# Patient Record
Sex: MALE | Race: WHITE | Employment: OTHER | URBAN - METROPOLITAN AREA
[De-identification: names, ages, dates, MRNs, and addresses within clinical notes are randomized per-mention and may not be internally consistent; named-entity substitution may affect disease eponyms.]

---

## 2018-08-30 ENCOUNTER — TELEPHONE (OUTPATIENT)
Dept: ONCOLOGY | Age: 78
End: 2018-08-30

## 2018-08-30 DIAGNOSIS — C67.9 MALIGNANT NEOPLASM OF URINARY BLADDER, UNSPECIFIED SITE (HCC): ICD-10-CM

## 2018-09-04 NOTE — TELEPHONE ENCOUNTER
Dr Lindsay Patel called office and left voicemail stating he wants patient's tx started ASAP. Writer reviewed orders and they are approved. Writer informed Chance Richards, , that Dr Lindsay Patel wants tx started ASAP. Writer also informed Chance Richards that patient will need f/u appt on 9/24 with CNP. Naz Celis communicated understanding.  Vivian Hernandez

## 2018-09-05 ENCOUNTER — TELEPHONE (OUTPATIENT)
Dept: INFUSION THERAPY | Age: 78
End: 2018-09-05

## 2018-09-05 NOTE — TELEPHONE ENCOUNTER
No information found in chart to complete the double check. Patient is a transfer from [address] [place] [address]. BCG treatment plan in chart, see the order.

## 2018-09-07 ENCOUNTER — TELEPHONE (OUTPATIENT)
Dept: ONCOLOGY | Age: 78
End: 2018-09-07

## 2018-09-07 ENCOUNTER — OFFICE VISIT (OUTPATIENT)
Dept: ONCOLOGY | Age: 78
End: 2018-09-07
Payer: MEDICARE

## 2018-09-07 DIAGNOSIS — C67.9 MALIGNANT NEOPLASM OF URINARY BLADDER, UNSPECIFIED SITE (HCC): ICD-10-CM

## 2018-09-07 DIAGNOSIS — C67.9 MALIGNANT NEOPLASM OF URINARY BLADDER, UNSPECIFIED SITE (HCC): Primary | ICD-10-CM

## 2018-09-07 PROCEDURE — 99214 OFFICE O/P EST MOD 30 MIN: CPT

## 2018-09-07 PROCEDURE — 99999 PR OFFICE/OUTPT VISIT,PROCEDURE ONLY: CPT | Performed by: INTERNAL MEDICINE

## 2018-09-07 NOTE — PROGRESS NOTES
Marquis Perdomo here for chemotherapy teaching. Spent 90 minutes with them. Teaching sheets from [facility] and verbal information given on chemotherapy agents, action, administration and side effects. Chemotherapy medications discussed: BCG    Chemotherapy consent form signed by patient. Provided new patient binder, Chemotherapy and You booklet, Eating Hints booklet and welcome bag with folder, water bottle, note pad etc.     Questions answered and support given.     [facility] rehab referral assessment form, distress tool form and PG-SGA form completed by patient.     Needs that were identified during teaching visit: Marquis Perdomo has concerns about getting a catheter. He had one recently and experienced a lot of pain. I assured him we could use a numbing agent so he would not feel so much pain during catheterization. Explained how to hold BCG in the bladder and turning every 15 minutes for 2 hours. Discussed proper toileting and safety precautions as outlined in [facility] handout. [facility] questioned if he would need a ride because his wife doesn't drive. Informed him he could drive after the procedure and he states he would rather drive himself in that case. He has not had a TB skin test.  Will have it done on 9/10/19 at PCP office. He also has an appointment with PCP for clearance for treatment. Discussed community resources such as [address], [employer], [employer], [email or web address], etc.     Pt will return on 9/19/18 for C1D1.

## 2018-09-07 NOTE — TELEPHONE ENCOUNTER
Notified by pharmacy that pt is starting BCG therapy and hasnt rec'd TB testing. Test ordered as directed. Spoke with his PCP, Dr. Giorgi Lee office, they can accommodate testing on Mon with Wed read. Order given to pt to hand carry with appt times as well. Pt informed could start tx on Wed, after test was read on 9/12/18. He stopped at check out desk to get scheduled for BCG.

## 2018-09-10 ENCOUNTER — TELEPHONE (OUTPATIENT)
Dept: ONCOLOGY | Age: 78
End: 2018-09-10

## 2018-09-12 ENCOUNTER — TELEPHONE (OUTPATIENT)
Dept: ONCOLOGY | Age: 78
End: 2018-09-12

## 2018-09-14 ENCOUNTER — TELEPHONE (OUTPATIENT)
Dept: ONCOLOGY | Age: 78
End: 2018-09-14

## 2018-09-19 ENCOUNTER — HOSPITAL ENCOUNTER (OUTPATIENT)
Dept: INFUSION THERAPY | Age: 78
Discharge: HOME | End: 2018-09-19
Payer: MEDICARE

## 2018-09-19 VITALS
WEIGHT: 154.4 LBS | DIASTOLIC BLOOD PRESSURE: 70 MMHG | RESPIRATION RATE: 17 BRPM | BODY MASS INDEX: 24.92 KG/M2 | TEMPERATURE: 97.5 F | SYSTOLIC BLOOD PRESSURE: 149 MMHG | HEART RATE: 70 BPM

## 2018-09-19 DIAGNOSIS — C67.9 MALIGNANT NEOPLASM OF URINARY BLADDER, UNSPECIFIED SITE (HCC): ICD-10-CM

## 2018-09-19 PROCEDURE — 90586 BCG VACCINE INTRAVESICAL: CPT | Performed by: INTERNAL MEDICINE

## 2018-09-19 PROCEDURE — 6370000000 HC RX 637 (ALT 250 FOR IP): Performed by: INTERNAL MEDICINE

## 2018-09-19 PROCEDURE — 2580000003 HC RX 258: Performed by: INTERNAL MEDICINE

## 2018-09-19 PROCEDURE — 51720 TREATMENT OF BLADDER LESION: CPT

## 2018-09-19 PROCEDURE — 6360000002 HC RX W HCPCS: Performed by: INTERNAL MEDICINE

## 2018-09-19 RX ADMIN — LIDOCAINE HYDROCHLORIDE: 20 JELLY TOPICAL at 15:27

## 2018-09-19 RX ADMIN — SODIUM CHLORIDE 50 MG: 9 INJECTION, SOLUTION INTRAVENOUS at 15:29

## 2018-09-19 NOTE — PROGRESS NOTES
Patient straight cathed and medication injected through catheter. Patient tolerated procedure well patient positioned per protocol.

## 2018-09-19 NOTE — PROGRESS NOTES
RN reiterated instructions of when to void and what to do after voiding per protocol. Patient and wife verbalized understanding. Patient discharged home with family in stable condition.

## 2018-09-20 ENCOUNTER — TELEPHONE (OUTPATIENT)
Dept: ONCOLOGY | Age: 78
End: 2018-09-20

## 2018-09-20 NOTE — TELEPHONE ENCOUNTER
called patient to introduce self and services. The first phone number for patient did not belong to patient. When his home phone was called it was reported as disconnected.  will meet with patient at upcoming appointment.

## 2018-09-24 ENCOUNTER — TELEPHONE (OUTPATIENT)
Dept: ONCOLOGY | Age: 78
End: 2018-09-24

## 2018-09-24 ENCOUNTER — HOSPITAL ENCOUNTER (OUTPATIENT)
Age: 78
Discharge: HOME | End: 2018-09-24
Payer: MEDICARE

## 2018-09-24 DIAGNOSIS — C67.9 MALIGNANT NEOPLASM OF URINARY BLADDER, UNSPECIFIED SITE (HCC): Primary | ICD-10-CM

## 2018-09-24 DIAGNOSIS — C67.9 MALIGNANT NEOPLASM OF URINARY BLADDER, UNSPECIFIED SITE (HCC): ICD-10-CM

## 2018-09-24 LAB
-: NORMAL
AMORPHOUS: NORMAL
BACTERIA: NORMAL
BILIRUBIN URINE: NEGATIVE
CASTS UA: NORMAL /LPF
COLOR: YELLOW
COMMENT UA: ABNORMAL
CRYSTALS, UA: NORMAL /HPF
EPITHELIAL CELLS UA: NORMAL /HPF (ref 0–5)
GLUCOSE URINE: NEGATIVE
KETONES, URINE: NEGATIVE
LEUKOCYTE ESTERASE, URINE: ABNORMAL
MUCUS: NORMAL
NITRITE, URINE: NEGATIVE
OTHER OBSERVATIONS UA: NORMAL
PH UA: 5.5 (ref 5–8)
PROTEIN UA: ABNORMAL
RBC UA: NORMAL /HPF (ref 0–2)
RENAL EPITHELIAL, UA: NORMAL /HPF
SPECIFIC GRAVITY UA: 1 (ref 1–1.03)
TRICHOMONAS: NORMAL
TURBIDITY: CLEAR
URINE HGB: ABNORMAL
UROBILINOGEN, URINE: NORMAL
WBC UA: NORMAL /HPF (ref 0–5)
YEAST: NORMAL

## 2018-09-24 PROCEDURE — 81001 URINALYSIS AUTO W/SCOPE: CPT

## 2018-09-24 PROCEDURE — 87086 URINE CULTURE/COLONY COUNT: CPT

## 2018-09-25 ENCOUNTER — TELEPHONE (OUTPATIENT)
Dept: ONCOLOGY | Age: 78
End: 2018-09-25

## 2018-09-25 DIAGNOSIS — C67.9 MALIGNANT NEOPLASM OF URINARY BLADDER, UNSPECIFIED SITE (HCC): Primary | ICD-10-CM

## 2018-09-25 LAB
CULTURE: NO GROWTH
Lab: NORMAL
SPECIMEN DESCRIPTION: NORMAL
STATUS: NORMAL

## 2018-09-25 RX ORDER — LEVOFLOXACIN 500 MG/1
TABLET, FILM COATED ORAL
Qty: 5 TABLET | Refills: 0 | Status: SHIPPED | OUTPATIENT
Start: 2018-09-25 | End: 2019-04-17 | Stop reason: ALTCHOICE

## 2018-09-25 NOTE — TELEPHONE ENCOUNTER
Patient called office concerned stating he has cycle 2 BCG tx tomorrow and is concerned regarding urinary frequency/urgency. Patient stated he had UA with C&S performed this morning. Dr Johnathan Ennis reviewed results and stated he wants patient to continue with tx tomorrow and stated for patient to monitor s/s closely. Dr Johnathan Ennis stated for patient to drink plenty of water and call office if s/s worsen or with any other issues. Writer called patient back and informed him of above. Patient stated he is concerned regarding holding BCG in for 2 hours tomorrow. Writer left message with Dr Marianna Rosa him of patient's concerns. Writer to contact patient with any further instructions/management once Dr Johnathan Ennis calls back.  Sarah Caballero

## 2018-09-25 NOTE — TELEPHONE ENCOUNTER
Dr Shreyas Scott stated to submit rx for Levaquin 500mg PO daily X5 days and continue with tx tomorrow. Rx submitted to [facility] per patient's request. Writer instructed patient to start medication tonight. Patient verbalized understanding.  Wang Rogers

## 2018-09-26 ENCOUNTER — OFFICE VISIT (OUTPATIENT)
Dept: ONCOLOGY | Age: 78
End: 2018-09-26
Payer: MEDICARE

## 2018-09-26 ENCOUNTER — HOSPITAL ENCOUNTER (OUTPATIENT)
Dept: INFUSION THERAPY | Age: 78
Discharge: HOME | End: 2018-09-26
Payer: MEDICARE

## 2018-09-26 ENCOUNTER — TELEPHONE (OUTPATIENT)
Dept: ONCOLOGY | Age: 78
End: 2018-09-26

## 2018-09-26 VITALS
DIASTOLIC BLOOD PRESSURE: 77 MMHG | HEART RATE: 41 BPM | WEIGHT: 154 LBS | BODY MASS INDEX: 24.86 KG/M2 | TEMPERATURE: 97.8 F | SYSTOLIC BLOOD PRESSURE: 136 MMHG

## 2018-09-26 VITALS
RESPIRATION RATE: 16 BRPM | SYSTOLIC BLOOD PRESSURE: 148 MMHG | DIASTOLIC BLOOD PRESSURE: 69 MMHG | WEIGHT: 154 LBS | BODY MASS INDEX: 24.86 KG/M2 | HEART RATE: 76 BPM | TEMPERATURE: 97.8 F

## 2018-09-26 DIAGNOSIS — C67.9 MALIGNANT NEOPLASM OF URINARY BLADDER, UNSPECIFIED SITE (HCC): ICD-10-CM

## 2018-09-26 DIAGNOSIS — C67.9 MALIGNANT NEOPLASM OF URINARY BLADDER, UNSPECIFIED SITE (HCC): Primary | ICD-10-CM

## 2018-09-26 PROCEDURE — 2580000003 HC RX 258: Performed by: INTERNAL MEDICINE

## 2018-09-26 PROCEDURE — 6360000002 HC RX W HCPCS: Performed by: INTERNAL MEDICINE

## 2018-09-26 PROCEDURE — 99211 OFF/OP EST MAY X REQ PHY/QHP: CPT | Performed by: INTERNAL MEDICINE

## 2018-09-26 PROCEDURE — 6370000000 HC RX 637 (ALT 250 FOR IP): Performed by: INTERNAL MEDICINE

## 2018-09-26 PROCEDURE — 99214 OFFICE O/P EST MOD 30 MIN: CPT | Performed by: INTERNAL MEDICINE

## 2018-09-26 PROCEDURE — 90586 BCG VACCINE INTRAVESICAL: CPT | Performed by: INTERNAL MEDICINE

## 2018-09-26 PROCEDURE — 51720 TREATMENT OF BLADDER LESION: CPT

## 2018-09-26 RX ADMIN — SODIUM CHLORIDE 50 MG: 9 INJECTION, SOLUTION INTRAVENOUS at 08:54

## 2018-09-26 RX ADMIN — LIDOCAINE HYDROCHLORIDE: 20 JELLY TOPICAL at 08:49

## 2018-09-26 NOTE — PROGRESS NOTES
Patient tolerated treatment well. Patient rolled per protocol. Patient discharged with spouse to MD appointment in stable condition.

## 2018-09-26 NOTE — PROGRESS NOTES
At 0850 straight cath was placed without difficulty. Medication instilled through catheter without difficulty. Catheter removed. Patient advised of rolling protocol. Patient tolerated well and was discharged to MD appointment with spouse in stable condition.

## 2018-09-26 NOTE — TELEPHONE ENCOUNTER
called patient to introduce self and services.  was unable to leave message.  will meet with patient at appointment next week.

## 2018-10-03 ENCOUNTER — TELEPHONE (OUTPATIENT)
Dept: ONCOLOGY | Age: 78
End: 2018-10-03

## 2018-10-03 ENCOUNTER — HOSPITAL ENCOUNTER (OUTPATIENT)
Dept: INFUSION THERAPY | Age: 78
Discharge: HOME | End: 2018-10-03
Payer: MEDICARE

## 2018-10-03 VITALS
RESPIRATION RATE: 16 BRPM | HEART RATE: 86 BPM | SYSTOLIC BLOOD PRESSURE: 137 MMHG | DIASTOLIC BLOOD PRESSURE: 92 MMHG | TEMPERATURE: 98.2 F | WEIGHT: 154.8 LBS | BODY MASS INDEX: 24.99 KG/M2

## 2018-10-03 DIAGNOSIS — C67.9 MALIGNANT NEOPLASM OF URINARY BLADDER, UNSPECIFIED SITE (HCC): ICD-10-CM

## 2018-10-03 PROCEDURE — 6360000002 HC RX W HCPCS: Performed by: INTERNAL MEDICINE

## 2018-10-03 PROCEDURE — 51720 TREATMENT OF BLADDER LESION: CPT

## 2018-10-03 PROCEDURE — 2580000003 HC RX 258: Performed by: INTERNAL MEDICINE

## 2018-10-03 PROCEDURE — 90586 BCG VACCINE INTRAVESICAL: CPT | Performed by: INTERNAL MEDICINE

## 2018-10-03 PROCEDURE — 6370000000 HC RX 637 (ALT 250 FOR IP): Performed by: INTERNAL MEDICINE

## 2018-10-03 RX ADMIN — SODIUM CHLORIDE 50 MG: 9 INJECTION, SOLUTION INTRAVENOUS at 15:29

## 2018-10-03 RX ADMIN — LIDOCAINE HYDROCHLORIDE: 20 JELLY TOPICAL at 15:29

## 2018-10-03 NOTE — PROGRESS NOTES
Patient arrived for BCG 3   Tolerated treatment without incident   Ambulated to exit in stable condition   Return  10/10 1121 Arline Landeros RN

## 2018-10-05 NOTE — PROGRESS NOTES
finasteride (PROSCAR) 5 MG tablet Take 5 mg by mouth daily.  simvastatin (ZOCOR) 40 MG tablet Take 40 mg by mouth nightly.  Cholecalciferol (VITAMIN D) 2000 UNITS CAPS capsule Take 1 capsule by mouth daily.  sulfamethoxazole-trimethoprim (BACTRIM DS) 800-160 MG per tablet Take 1 tablet by mouth daily. No current facility-administered medications for this visit. VISIT DIAGNOSIS:  The encounter diagnosis was Malignant neoplasm of urinary bladder, unspecified site [facility]). STAGING:  Cancer Staging  No matching staging information was found for the patient. Patient Active Problem List    Diagnosis Date Noted    Bladder cancer [facility]) 08/30/2018    Mass of mediastinum 02/04/2014       SUBJECTIVE:  Cristina Saez is a very pleasant [de-identified] y.o. male who is Presenting for follow-up. He was diagnosed with non-muscle invasive high-grade bladder cancer in mid August 2018 by TURBT. He was referred to me in late August 2018 for consideration of BCG. Since last seen, he did start BCG treatments. He is scheduled to receive his second dose today. So far, he is tolerating this fairly well. He does have some frequency and urgency. Otherwise, he has no terrible side effects. He is accompanied to his appointment by his wife. Overall, she thinks she's doing fairly well. PHYSICAL EXAM:   Vitals:    09/26/18 1031   BP: 136/77   Pulse: (!) 41   Temp: 97.8 °F (36.6 °C)       Physical Exam   Constitutional: He is oriented to person, place, and time. He appears well-developed and well-nourished. No distress. HENT:   Head: Normocephalic and atraumatic. Eyes: Pupils are equal, round, and reactive to light. Neck: Neck supple. Cardiovascular: Normal rate, regular rhythm and normal heart sounds. No murmur heard. Pulmonary/Chest: Effort normal and breath sounds normal. No stridor. No respiratory distress. He has no wheezes. Abdominal: Soft. Bowel sounds are normal. He exhibits no distension.  There is

## 2018-10-10 ENCOUNTER — HOSPITAL ENCOUNTER (OUTPATIENT)
Dept: INFUSION THERAPY | Age: 78
Discharge: HOME | End: 2018-10-10
Payer: MEDICARE

## 2018-10-10 VITALS
SYSTOLIC BLOOD PRESSURE: 139 MMHG | TEMPERATURE: 97.3 F | WEIGHT: 156 LBS | RESPIRATION RATE: 16 BRPM | BODY MASS INDEX: 25.18 KG/M2 | DIASTOLIC BLOOD PRESSURE: 73 MMHG | HEART RATE: 65 BPM

## 2018-10-10 DIAGNOSIS — C67.9 MALIGNANT NEOPLASM OF URINARY BLADDER, UNSPECIFIED SITE (HCC): ICD-10-CM

## 2018-10-10 PROCEDURE — 6360000002 HC RX W HCPCS: Performed by: INTERNAL MEDICINE

## 2018-10-10 PROCEDURE — 90586 BCG VACCINE INTRAVESICAL: CPT | Performed by: INTERNAL MEDICINE

## 2018-10-10 PROCEDURE — 51720 TREATMENT OF BLADDER LESION: CPT

## 2018-10-10 PROCEDURE — 2580000003 HC RX 258: Performed by: INTERNAL MEDICINE

## 2018-10-10 PROCEDURE — 6370000000 HC RX 637 (ALT 250 FOR IP): Performed by: INTERNAL MEDICINE

## 2018-10-10 RX ADMIN — LIDOCAINE HYDROCHLORIDE: 20 JELLY TOPICAL at 16:14

## 2018-10-10 RX ADMIN — SODIUM CHLORIDE 50 MG: 9 INJECTION, SOLUTION INTRAVENOUS at 16:14

## 2018-10-10 NOTE — PROGRESS NOTES
Patient tolerated treatment well and turned per protocol. Patient was discharged home with spouse in stable condition.

## 2018-10-17 ENCOUNTER — HOSPITAL ENCOUNTER (OUTPATIENT)
Dept: INFUSION THERAPY | Age: 78
Discharge: HOME | End: 2018-10-17
Payer: MEDICARE

## 2018-10-17 VITALS
RESPIRATION RATE: 16 BRPM | BODY MASS INDEX: 25.44 KG/M2 | WEIGHT: 157.6 LBS | HEART RATE: 64 BPM | TEMPERATURE: 97.4 F | SYSTOLIC BLOOD PRESSURE: 123 MMHG | DIASTOLIC BLOOD PRESSURE: 72 MMHG

## 2018-10-17 DIAGNOSIS — C67.9 MALIGNANT NEOPLASM OF URINARY BLADDER, UNSPECIFIED SITE (HCC): ICD-10-CM

## 2018-10-17 PROCEDURE — 6370000000 HC RX 637 (ALT 250 FOR IP): Performed by: INTERNAL MEDICINE

## 2018-10-17 PROCEDURE — 90586 BCG VACCINE INTRAVESICAL: CPT | Performed by: INTERNAL MEDICINE

## 2018-10-17 PROCEDURE — 51720 TREATMENT OF BLADDER LESION: CPT

## 2018-10-17 PROCEDURE — 6360000002 HC RX W HCPCS: Performed by: INTERNAL MEDICINE

## 2018-10-17 PROCEDURE — 2580000003 HC RX 258: Performed by: INTERNAL MEDICINE

## 2018-10-17 RX ADMIN — SODIUM CHLORIDE 50 MG: 9 INJECTION, SOLUTION INTRAVENOUS at 15:28

## 2018-10-17 RX ADMIN — LIDOCAINE HYDROCHLORIDE: 20 JELLY TOPICAL at 15:28

## 2018-10-17 NOTE — PROGRESS NOTES
Patient tolerated treatment well and was discharged home with family. Patient advised to roll per protocol when he gets home. Patient verbalized understanding.

## 2018-10-24 ENCOUNTER — HOSPITAL ENCOUNTER (OUTPATIENT)
Dept: INFUSION THERAPY | Age: 78
Discharge: HOME | End: 2018-10-24
Payer: MEDICARE

## 2018-10-24 VITALS
TEMPERATURE: 97.3 F | RESPIRATION RATE: 16 BRPM | SYSTOLIC BLOOD PRESSURE: 131 MMHG | HEART RATE: 71 BPM | DIASTOLIC BLOOD PRESSURE: 73 MMHG | WEIGHT: 156.6 LBS | BODY MASS INDEX: 25.28 KG/M2

## 2018-10-24 DIAGNOSIS — C67.9 MALIGNANT NEOPLASM OF URINARY BLADDER, UNSPECIFIED SITE (HCC): ICD-10-CM

## 2018-10-24 PROCEDURE — 90586 BCG VACCINE INTRAVESICAL: CPT | Performed by: INTERNAL MEDICINE

## 2018-10-24 PROCEDURE — 6360000002 HC RX W HCPCS: Performed by: INTERNAL MEDICINE

## 2018-10-24 PROCEDURE — 6370000000 HC RX 637 (ALT 250 FOR IP): Performed by: INTERNAL MEDICINE

## 2018-10-24 PROCEDURE — 2580000003 HC RX 258: Performed by: INTERNAL MEDICINE

## 2018-10-24 PROCEDURE — 51720 TREATMENT OF BLADDER LESION: CPT

## 2018-10-24 RX ADMIN — LIDOCAINE HYDROCHLORIDE: 20 JELLY TOPICAL at 15:21

## 2018-10-24 RX ADMIN — SODIUM CHLORIDE 50 MG: 9 INJECTION, SOLUTION INTRAVENOUS at 15:21

## 2018-10-24 NOTE — FLOWSHEET NOTE
Assessment:  learned from the [address], that Pt finished his last chemotherapy treatment today.  joined Mary and RNs as Pt hit the [employer]. Pt's wife, Isac Torres, was also present. Pt spoke after he hit the gong, sharing that he did not have \"fear of cancer\" while going through his treatment due to his 's advice to read the Bible. Pt shared that he had much peace after reading the Bible. Pt expressed gratitude to the staff and described them as \"professional.\" Mary presented parting gifts to Pt. RNs and Pt and Wife said good bye.  and Mary remained in conversation with Pt and Wife who shared about what has helped them stay  for 46 years and what they enjoy doing together, including playing Scrabble. Intervention:  Supportive presence, affirmation, exploration of resources, and words of encouragement. Outcome:  Pt expressed gratitude and named his sources of support and strength. Pt and Wife smiled as they left and thanked  for the support. Plan:  As Patient has finished his treatment, there is no plan for follow up  care at this time. 10/24/18 0065   Encounter Summary   Services provided to: Patient and family together   Referral/Consult From: Other disciplines   Support System Baptism/jennifer community; Spouse   Place of [address]   Continue Visiting (10/24/18)   Complexity of Encounter Low   Length of Encounter 15 minutes   Spiritual Assessment Completed Yes   Spiritual/Mandaeism   Type Spiritual support   Assessment Approachable; Hopeful;Peaceful;Coping   Intervention Sustaining presence/ Ministry of presence   Outcome Receptive; Hopeful;Encouraged;Coping;Connection/belonging;Expressed gratitude;Comfort       Electronically signed by Braden Mane, Oncology Outpatient [address], 2019 [facility]  10/24/2018  3:47 PM

## 2018-10-24 NOTE — PROGRESS NOTES
Patient tolerated treatment well. Patient was advised to roll per protocol when he gets home and to use bleach per protocol upon urination. Patient was discharged home with spouse in stable condition.

## 2018-11-15 ENCOUNTER — TELEPHONE (OUTPATIENT)
Dept: ONCOLOGY | Age: 78
End: 2018-11-15

## 2018-12-05 ENCOUNTER — OFFICE VISIT (OUTPATIENT)
Dept: ONCOLOGY | Age: 78
End: 2018-12-05
Payer: MEDICARE

## 2018-12-05 VITALS
WEIGHT: 163.2 LBS | DIASTOLIC BLOOD PRESSURE: 65 MMHG | BODY MASS INDEX: 26.34 KG/M2 | SYSTOLIC BLOOD PRESSURE: 114 MMHG | TEMPERATURE: 97.9 F | HEART RATE: 68 BPM

## 2018-12-05 DIAGNOSIS — C67.9 MALIGNANT NEOPLASM OF URINARY BLADDER, UNSPECIFIED SITE (HCC): Primary | ICD-10-CM

## 2018-12-05 PROCEDURE — 99213 OFFICE O/P EST LOW 20 MIN: CPT | Performed by: INTERNAL MEDICINE

## 2018-12-05 PROCEDURE — 99211 OFF/OP EST MAY X REQ PHY/QHP: CPT | Performed by: INTERNAL MEDICINE

## 2018-12-05 NOTE — PROGRESS NOTES
murmur heard. Pulmonary/Chest: Effort normal and breath sounds normal. No stridor. No respiratory distress. He has no wheezes. Abdominal: Soft. Bowel sounds are normal. He exhibits no distension. There is no tenderness. Musculoskeletal: Normal range of motion. He exhibits no edema. Neurological: He is alert and oriented to person, place, and time. No cranial nerve deficit. Skin: Skin is warm and dry. No rash noted. Psychiatric: He has a normal mood and affect. His behavior is normal.   Nursing note and vitals reviewed. LABS:   Results for orders placed or performed during the hospital encounter of 09/24/18   Urine culture clean catch   Result Value Ref Range    Specimen Description . CLEAN CATCH URINE     Special Requests NOT REPORTED     Culture NO GROWTH     Status FINAL 09/25/2018    Urinalysis Reflex to Culture   Result Value Ref Range    Color, UA YELLOW YEL    Turbidity UA CLEAR CLEAR    Glucose, Ur NEGATIVE NEG    Bilirubin Urine NEGATIVE NEG    Ketones, Urine NEGATIVE NEG    Specific Gravity, UA 1.004 (L) 1.005 - 1.030    Urine Hgb 3+ (A) NEG    pH, UA 5.5 5.0 - 8.0    Protein, UA 1+ (A) NEG    Urobilinogen, Urine Normal NORM    Nitrite, Urine NEGATIVE NEG    Leukocyte Esterase, Urine SMALL (A) NEG    Urinalysis Comments NOT REPORTED    Microscopic Urinalysis   Result Value Ref Range    -          WBC, UA 10 TO 20 0 - 5 /HPF    RBC, UA 5 TO 10 0 - 2 /HPF    Casts UA NOT REPORTED /LPF    Crystals UA NOT REPORTED NONE /HPF    Epithelial Cells UA 2 TO 5 0 - 5 /HPF    Renal Epithelial, Urine NOT REPORTED 0 /HPF    Bacteria, UA NOT REPORTED NONE    Mucus, UA NOT REPORTED NONE    Trichomonas, UA NOT REPORTED NONE    Amorphous, UA NOT REPORTED NONE    Other Observations UA NOT REPORTED NREQ    Yeast, UA NOT REPORTED NONE       IMPRESSION:     1.  Malignant neoplasm of urinary bladder, unspecified site [facility])        Patient Active Problem List   Diagnosis    Mass of mediastinum    [facility] ([facility] [address]

## 2019-04-17 ENCOUNTER — OFFICE VISIT (OUTPATIENT)
Dept: ONCOLOGY | Age: 79
End: 2019-04-17
Payer: MEDICARE

## 2019-04-17 VITALS
SYSTOLIC BLOOD PRESSURE: 142 MMHG | HEART RATE: 78 BPM | TEMPERATURE: 97.5 F | DIASTOLIC BLOOD PRESSURE: 83 MMHG | BODY MASS INDEX: 25.53 KG/M2 | WEIGHT: 158.2 LBS

## 2019-04-17 DIAGNOSIS — C67.9 MALIGNANT NEOPLASM OF URINARY BLADDER, UNSPECIFIED SITE (HCC): Primary | ICD-10-CM

## 2019-04-17 PROCEDURE — 99214 OFFICE O/P EST MOD 30 MIN: CPT | Performed by: INTERNAL MEDICINE

## 2019-04-17 PROCEDURE — 99211 OFF/OP EST MAY X REQ PHY/QHP: CPT | Performed by: INTERNAL MEDICINE

## 2019-04-17 NOTE — PROGRESS NOTES
(ZOCOR) 40 MG tablet Take 40 mg by mouth nightly.  Cholecalciferol (VITAMIN D) 2000 UNITS CAPS capsule Take 1 capsule by mouth daily. No current facility-administered medications for this visit. VISIT DIAGNOSIS:  The encounter diagnosis was Malignant neoplasm of urinary bladder, unspecified site [facility]). STAGING:  Cancer Staging  No matching staging information was found for the patient. Patient Active Problem List    Diagnosis Date Noted    Bladder cancer ([facility] [address] 08/30/2018    Mass of mediastinum 02/04/2014       SUBJECTIVE:  Brandon Smart is a very pleasant 78 y. o. male who is Presenting for follow-up. He was diagnosed with non-muscle invasive high-grade bladder cancer in mid August 2018 by TURBT. He was referred to me in late August 2018 for consideration of BCG.     Since I last saw the patient, he continues to do well. He's not reporting any fevers, chills or symptoms of infection. He's not reporting any new abdominal pain, bloating, dysuria. He's not reporting any hematuria. He reports no chest pain or shortness of breath. His appetite and weight are stable. He has no new clinical concerns to bring to my attention today. He reports no pertinent changes to medical history, social history or family history. PHYSICAL EXAM:   Vitals:    04/17/19 1122   BP: (!) 142/83   Pulse: 78   Temp: 97.5 °F (36.4 °C)       Physical Exam   Constitutional: He is oriented to person, place, and time. He appears well-developed and well-nourished. No distress. HENT:   Head: Normocephalic and atraumatic. Eyes: Pupils are equal, round, and reactive to light. Neck: Neck supple. Cardiovascular: Normal rate, regular rhythm and normal heart sounds. No murmur heard. Pulmonary/Chest: Effort normal and breath sounds normal. No stridor. No respiratory distress. He has no wheezes. Abdominal: Soft. Bowel sounds are normal. He exhibits no distension. There is no tenderness.    Musculoskeletal: Normal range of motion. He exhibits no edema. Neurological: He is alert and oriented to person, place, and time. No cranial nerve deficit. Skin: Skin is warm and dry. No rash noted. Psychiatric: He has a normal mood and affect. His behavior is normal.   Nursing note and vitals reviewed. LABS:   Results for orders placed or performed during the hospital encounter of 09/24/18   Urine culture clean catch   Result Value Ref Range    Specimen Description . CLEAN CATCH URINE     Special Requests NOT REPORTED     Culture NO GROWTH     Status FINAL 09/25/2018    Urinalysis Reflex to Culture   Result Value Ref Range    Color, UA YELLOW YEL    Turbidity UA CLEAR CLEAR    Glucose, Ur NEGATIVE NEG    Bilirubin Urine NEGATIVE NEG    Ketones, Urine NEGATIVE NEG    Specific Gravity, UA 1.004 (L) 1.005 - 1.030    Urine Hgb 3+ (A) NEG    pH, UA 5.5 5.0 - 8.0    Protein, UA 1+ (A) NEG    Urobilinogen, Urine Normal NORM    Nitrite, Urine NEGATIVE NEG    Leukocyte Esterase, Urine SMALL (A) NEG    Urinalysis Comments NOT REPORTED    Microscopic Urinalysis   Result Value Ref Range    -          WBC, UA 10 TO 20 0 - 5 /HPF    RBC, UA 5 TO 10 0 - 2 /HPF    Casts UA NOT REPORTED /LPF    Crystals UA NOT REPORTED NONE /HPF    Epithelial Cells UA 2 TO 5 0 - 5 /HPF    Renal Epithelial, Urine NOT REPORTED 0 /HPF    Bacteria, UA NOT REPORTED NONE    Mucus, UA NOT REPORTED NONE    Trichomonas, UA NOT REPORTED NONE    Amorphous, UA NOT REPORTED NONE    Other Observations UA NOT REPORTED NREQ    Yeast, UA NOT REPORTED NONE       IMPRESSION:     1. Malignant neoplasm of urinary bladder, unspecified site [facility])        Patient Active Problem List   Diagnosis    Mass of mediastinum    Bladder cancer ([facility] [address]       PLAN:     1. The patient was referred to me in August 2018 for management of non-muscle invasive high-grade bladder cancer. This was treated with cystoscopy and TURBT on 8/17/2018.   We completed 6 weekly doses of intravesicular BCG 9/19/2018 to 10/24/2018. He tolerated history of extremely well. 2. The patient had recent surveillance cystoscopy with his urologist, Dr. Yaa Zhao. In January 2019, the cystoscopy showed no convincing evidence of recurrent bladder tumor. He had another cystoscopy on 4/12/2019. Again, it showed no convincing evidence of relapsed bladder cancer. The patient is very pleased with these results. 3. At this point, we will release the patient to follow-up with his urologist for surveillance endoscopy. He is very comfortable with this. He'll return to see me on an as-needed basis.       Electronically signed by Cedric Goodman MD on 4/17/2019 at 11:41 AM

## 2019-09-25 ENCOUNTER — APPOINTMENT (OUTPATIENT)
Dept: CT IMAGING | Age: 79
End: 2019-09-25
Payer: MEDICARE

## 2019-09-25 ENCOUNTER — APPOINTMENT (OUTPATIENT)
Dept: GENERAL RADIOLOGY | Age: 79
End: 2019-09-25
Payer: MEDICARE

## 2019-09-25 ENCOUNTER — HOSPITAL ENCOUNTER (EMERGENCY)
Age: 79
Discharge: HOME | End: 2019-09-25
Attending: EMERGENCY MEDICINE
Payer: MEDICARE

## 2019-09-25 VITALS
SYSTOLIC BLOOD PRESSURE: 120 MMHG | HEART RATE: 69 BPM | TEMPERATURE: 98.7 F | DIASTOLIC BLOOD PRESSURE: 49 MMHG | OXYGEN SATURATION: 100 % | RESPIRATION RATE: 16 BRPM
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
## 2019-09-25 DIAGNOSIS — S42.211A CLOSED FRACTURE OF NECK OF RIGHT HUMERUS, INITIAL ENCOUNTER: Primary | ICD-10-CM

## 2019-09-25 PROCEDURE — 99284 EMERGENCY DEPT VISIT MOD MDM: CPT

## 2019-09-25 PROCEDURE — 72125 CT NECK SPINE W/O DYE: CPT

## 2019-09-25 PROCEDURE — 73030 X-RAY EXAM OF SHOULDER: CPT

## 2019-09-25 PROCEDURE — 6370000000 HC RX 637 (ALT 250 FOR IP): Performed by: EMERGENCY MEDICINE

## 2019-09-25 RX ORDER — HYDROCODONE BITARTRATE AND ACETAMINOPHEN 5; 325 MG/1; MG/1
1 TABLET ORAL ONCE
Status: COMPLETED | OUTPATIENT
Start: 2019-09-25 | End: 2019-09-25

## 2019-09-25 RX ORDER — HYDROCODONE BITARTRATE AND ACETAMINOPHEN 5; 325 MG/1; MG/1
1 TABLET ORAL 3 TIMES DAILY PRN
Qty: 15 TABLET | Refills: 0 | Status: SHIPPED | OUTPATIENT
Start: 2019-09-25 | End: 2019-09-30

## 2019-09-25 RX ADMIN — HYDROCODONE BITARTRATE AND ACETAMINOPHEN 1 TABLET: 5; 325 TABLET ORAL at 20:16

## 2019-09-25 ASSESSMENT — ENCOUNTER SYMPTOMS
VOMITING: 0
COUGH: 0
BACK PAIN: 0
SHORTNESS OF BREATH: 0
ABDOMINAL PAIN: 0
NAUSEA: 0
COLOR CHANGE: 0

## 2019-09-25 ASSESSMENT — PAIN SCALES - GENERAL: PAINLEVEL_OUTOF10: 10

## 2019-09-26 NOTE — ED PROVIDER NOTES
Attending Supervisory Note/Shared Visit   I have personally performed a face to face diagnostic evaluation on this patient. I have reviewed the mid-levels findings and agree. Patient has a comminuted right proximal humeral fracture which is immobilized with a sling and swath. He is placed on Norco and is referred to outpatient orthopedics follow-up. Summation      Patient Course: Discharged. ED Medications administered this visit:    Medications   HYDROcodone-acetaminophen (NORCO) 5-325 MG per tablet 1 tablet (1 tablet Oral Given 9/25/19 2016)       New Prescriptions from this visit:    Discharge Medication List as of 9/25/2019  9:03 PM      START taking these medications    Details   HYDROcodone-acetaminophen (NORCO) 5-325 MG per tablet Take 1 tablet by mouth 3 times daily as needed for Pain for up to 5 days. , Disp-15 tablet, [id number]             Follow-up:  Anders Woods MD  [address] [place] [address]  [phone]              Final Impression:   1. Closed fracture of neck of right humerus, initial encounter    2.  Fall, initial encounter               (Please note that portions of this note were completed with a voice recognition program.  Efforts were made to edit the dictations but occasionally words are mis-transcribed.)      (Please note that portions of this note were completed with a voice recognition program.  Efforts were made to edit the dictations but occasionally words are mis-transcribed.)    Donna Newsome MD  Attending Emergency Physician        Donna Newsome MD  09/26/19 7515
Psychiatric: He has a normal mood and affect. His behavior is normal.   Vitals reviewed. DIAGNOSTIC RESULTS     RADIOLOGY:   Non-plain film images such as CT, Ultrasound and MRI are read by the radiologist. Janet Frazier radiographic images are visualized and preliminarily interpreted by the emergency physician with the below findings:    Interpretation per the Radiologist below, if available at the time of this note:    Xr Shoulder Right (min 2 Views)    Result Date: 9/25/2019  EXAMINATION: 2 XRAY VIEWS OF THE RIGHT SHOULDER 9/25/2019 7:24 pm COMPARISON: 10/19/2006 HISTORY: ORDERING SYSTEM PROVIDED HISTORY: fall Reason for Exam: Pt states right shoulder pain due to fall today. Best images possible due to limited ROM Acuity: Acute Type of Exam: Initial FINDINGS: Comminuted mildly displaced right humeral neck fracture. No dislocation of the glenohumeral joint. Mild AC joint hypertrophy. Visualized right lung appears unremarkable. Calcification aortic knob. Multilevel hypertrophic degenerative change of the visualized thoracic spine. Comminuted mildly displaced right humeral neck fracture. Ct Cervical Spine Wo Contrast    Result Date: 9/25/2019  EXAMINATION: CT OF THE CERVICAL SPINE WITHOUT CONTRAST 9/25/2019 7:56 pm TECHNIQUE: CT of the cervical spine was performed without the administration of intravenous contrast. Multiplanar reformatted images are provided for review. Dose modulation, iterative reconstruction, and/or weight based adjustment of the mA/kV was utilized to reduce the radiation dose to as low as reasonably achievable. COMPARISON: None. HISTORY: ORDERING SYSTEM PROVIDED HISTORY: fall FINDINGS: BONES/ALIGNMENT: Detail is limited due to artifact. There is no gross malalignment. There is no acute cervical spine fracture. DEGENERATIVE CHANGES: Degenerative changes throughout the cervical spine are noted.  SOFT TISSUES: Small low-density lesion in the left thyroid lobe is noted measuring less than

## 2022-03-15 ENCOUNTER — APPOINTMENT (OUTPATIENT)
Dept: GENERAL RADIOLOGY | Age: 82
DRG: 259 | End: 2022-03-15
Payer: MEDICARE

## 2022-03-15 ENCOUNTER — HOSPITAL ENCOUNTER (INPATIENT)
Age: 82
LOS: 7 days | Discharge: HOME | DRG: 259 | End: 2022-03-22
Attending: EMERGENCY MEDICINE | Admitting: INTERNAL MEDICINE
Payer: MEDICARE

## 2022-03-15 DIAGNOSIS — R42 ORTHOSTATIC DIZZINESS: ICD-10-CM

## 2022-03-15 DIAGNOSIS — R55 SYNCOPE AND COLLAPSE: Primary | ICD-10-CM

## 2022-03-15 LAB
ABSOLUTE EOS #: 0.2 K/UL (ref 0–0.44)
ABSOLUTE IMMATURE GRANULOCYTE: 0.04 K/UL (ref 0–0.3)
ABSOLUTE LYMPH #: 1.85 K/UL (ref 1.1–3.7)
ABSOLUTE MONO #: 0.84 K/UL (ref 0.1–1.2)
ANION GAP SERPL CALCULATED.3IONS-SCNC: 14 MMOL/L (ref 9–17)
BASOPHILS # BLD: 1 % (ref 0–2)
BASOPHILS ABSOLUTE: 0.04 K/UL (ref 0–0.2)
BUN BLDV-MCNC: 21 MG/DL (ref 8–23)
BUN/CREAT BLD: 17 (ref 9–20)
CALCIUM SERPL-MCNC: 9.7 MG/DL (ref 8.6–10.4)
CHLORIDE BLD-SCNC: 100 MMOL/L (ref 98–107)
CO2: 25 MMOL/L (ref 20–31)
CREAT SERPL-MCNC: 1.26 MG/DL (ref 0.7–1.2)
EOSINOPHILS RELATIVE PERCENT: 3 % (ref 1–4)
GFR AFRICAN AMERICAN: >60 ML/MIN
GFR NON-AFRICAN AMERICAN: 55 ML/MIN
GFR SERPL CREATININE-BSD FRML MDRD: ABNORMAL ML/MIN/{1.73_M2}
GLUCOSE BLD-MCNC: 200 MG/DL (ref 75–110)
GLUCOSE BLD-MCNC: 214 MG/DL (ref 70–99)
HCT VFR BLD CALC: 31.3 % (ref 40.7–50.3)
HEMOGLOBIN: 10.3 G/DL (ref 13–17)
IMMATURE GRANULOCYTES: 1 %
LYMPHOCYTES # BLD: 26 % (ref 24–43)
MAGNESIUM: 2.1 MG/DL (ref 1.6–2.6)
MCH RBC QN AUTO: 27.8 PG (ref 25.2–33.5)
MCHC RBC AUTO-ENTMCNC: 32.9 G/DL (ref 28.4–34.8)
MCV RBC AUTO: 84.4 FL (ref 82.6–102.9)
MONOCYTES # BLD: 12 % (ref 3–12)
NRBC AUTOMATED: 0 PER 100 WBC
PDW BLD-RTO: 12.5 % (ref 11.8–14.4)
PLATELET # BLD: 213 K/UL (ref 138–453)
PMV BLD AUTO: 11.5 FL (ref 8.1–13.5)
POTASSIUM SERPL-SCNC: 3.4 MMOL/L (ref 3.7–5.3)
RBC # BLD: 3.71 M/UL (ref 4.21–5.77)
SEG NEUTROPHILS: 57 % (ref 36–65)
SEGMENTED NEUTROPHILS ABSOLUTE COUNT: 4.14 K/UL (ref 1.5–8.1)
SODIUM BLD-SCNC: 139 MMOL/L (ref 135–144)
TROPONIN, HIGH SENSITIVITY: 20 NG/L (ref 0–22)
TROPONIN, HIGH SENSITIVITY: 21 NG/L (ref 0–22)
TROPONIN, HIGH SENSITIVITY: 21 NG/L (ref 0–22)
TROPONIN, HIGH SENSITIVITY: 24 NG/L (ref 0–22)
WBC # BLD: 7.1 K/UL (ref 3.5–11.3)

## 2022-03-15 PROCEDURE — 99285 EMERGENCY DEPT VISIT HI MDM: CPT

## 2022-03-15 PROCEDURE — 71045 X-RAY EXAM CHEST 1 VIEW: CPT

## 2022-03-15 PROCEDURE — 80048 BASIC METABOLIC PNL TOTAL CA: CPT

## 2022-03-15 PROCEDURE — 6360000002 HC RX W HCPCS: Performed by: INTERNAL MEDICINE

## 2022-03-15 PROCEDURE — 2580000003 HC RX 258: Performed by: EMERGENCY MEDICINE

## 2022-03-15 PROCEDURE — 82947 ASSAY GLUCOSE BLOOD QUANT: CPT

## 2022-03-15 PROCEDURE — 83735 ASSAY OF MAGNESIUM: CPT

## 2022-03-15 PROCEDURE — 2580000003 HC RX 258: Performed by: INTERNAL MEDICINE

## 2022-03-15 PROCEDURE — 36415 COLL VENOUS BLD VENIPUNCTURE: CPT

## 2022-03-15 PROCEDURE — 84484 ASSAY OF TROPONIN QUANT: CPT

## 2022-03-15 PROCEDURE — 93005 ELECTROCARDIOGRAM TRACING: CPT | Performed by: EMERGENCY MEDICINE

## 2022-03-15 PROCEDURE — 2060000000 HC ICU INTERMEDIATE R&B

## 2022-03-15 PROCEDURE — 85025 COMPLETE CBC W/AUTO DIFF WBC: CPT

## 2022-03-15 RX ORDER — SODIUM CHLORIDE 0.9 % (FLUSH) 0.9 %
5-40 SYRINGE (ML) INJECTION PRN
Status: DISCONTINUED | OUTPATIENT
Start: 2022-03-15 | End: 2022-03-22 | Stop reason: HOSPADM

## 2022-03-15 RX ORDER — SODIUM CHLORIDE 0.9 % (FLUSH) 0.9 %
5-40 SYRINGE (ML) INJECTION EVERY 12 HOURS SCHEDULED
Status: DISCONTINUED | OUTPATIENT
Start: 2022-03-15 | End: 2022-03-22 | Stop reason: HOSPADM

## 2022-03-15 RX ORDER — ONDANSETRON 2 MG/ML
4 INJECTION INTRAMUSCULAR; INTRAVENOUS EVERY 6 HOURS PRN
Status: DISCONTINUED | OUTPATIENT
Start: 2022-03-15 | End: 2022-03-22 | Stop reason: HOSPADM

## 2022-03-15 RX ORDER — ONDANSETRON 4 MG/1
4 TABLET, ORALLY DISINTEGRATING ORAL EVERY 8 HOURS PRN
Status: DISCONTINUED | OUTPATIENT
Start: 2022-03-15 | End: 2022-03-22 | Stop reason: HOSPADM

## 2022-03-15 RX ORDER — SODIUM CHLORIDE 9 MG/ML
25 INJECTION, SOLUTION INTRAVENOUS PRN
Status: DISCONTINUED | OUTPATIENT
Start: 2022-03-15 | End: 2022-03-22 | Stop reason: HOSPADM

## 2022-03-15 RX ORDER — ACETAMINOPHEN 325 MG/1
650 TABLET ORAL EVERY 4 HOURS PRN
Status: DISCONTINUED | OUTPATIENT
Start: 2022-03-15 | End: 2022-03-22 | Stop reason: HOSPADM

## 2022-03-15 RX ORDER — SODIUM CHLORIDE 9 MG/ML
INJECTION, SOLUTION INTRAVENOUS CONTINUOUS
Status: DISCONTINUED | OUTPATIENT
Start: 2022-03-15 | End: 2022-03-18

## 2022-03-15 RX ORDER — SODIUM CHLORIDE 9 MG/ML
1000 INJECTION, SOLUTION INTRAVENOUS CONTINUOUS
Status: DISCONTINUED | OUTPATIENT
Start: 2022-03-15 | End: 2022-03-16

## 2022-03-15 RX ORDER — 0.9 % SODIUM CHLORIDE 0.9 %
500 INTRAVENOUS SOLUTION INTRAVENOUS ONCE
Status: COMPLETED | OUTPATIENT
Start: 2022-03-15 | End: 2022-03-15

## 2022-03-15 RX ADMIN — SODIUM CHLORIDE 1000 ML: 9 INJECTION, SOLUTION INTRAVENOUS at 21:14

## 2022-03-15 RX ADMIN — SODIUM CHLORIDE: 9 INJECTION, SOLUTION INTRAVENOUS at 23:31

## 2022-03-15 RX ADMIN — ENOXAPARIN SODIUM 40 MG: 100 INJECTION SUBCUTANEOUS at 23:09

## 2022-03-15 RX ADMIN — SODIUM CHLORIDE 500 ML: 9 INJECTION, SOLUTION INTRAVENOUS at 16:49

## 2022-03-15 ASSESSMENT — ENCOUNTER SYMPTOMS
CONSTIPATION: 0
ABDOMINAL DISTENTION: 0
SHORTNESS OF BREATH: 0
DIARRHEA: 0
VOMITING: 0
COLOR CHANGE: 0
CHEST TIGHTNESS: 0
EYES NEGATIVE: 1
APNEA: 0
SINUS PAIN: 0

## 2022-03-15 NOTE — ED NOTES
[facility] technician @ bedside to interrogate pt's pace maker.       Derick Diamond RN  03/15/22 [id number]

## 2022-03-15 NOTE — ED PROVIDER NOTES
EMERGENCY DEPARTMENT ENCOUNTER    Pt Name: Job Pitch  MRN: [id number]  Kevin 1940  Date of evaluation: 3/15/22  CHIEF COMPLAINT       Chief Complaint   Patient presents with    Hypotension    Loss of Consciousness     HISTORY OF PRESENT ILLNESS   78-year-old male presents emergency room after a syncopal episode outside of [employer]. Wife called EMS. Patient did not have any significant head injury. He does not exactly remember the syncopal episode. Per EMS when they first arrived patient's heart rate was in the 30s. There is no documented EKG of this patient's heart rate is now in the 60s. Patient does have a pacemaker. Patient's cardiologist is Dr. Ancel Duane. Here in the emergency room he has some generalized weakness and fatigue. He reports no chest pain or difficulty breathing. REVIEW OF SYSTEMS     Review of Systems   Constitutional: Positive for fatigue. Negative for activity change, chills and diaphoresis. HENT: Negative for congestion, sinus pain and tinnitus. Eyes: Negative. Respiratory: Negative for apnea, chest tightness and shortness of breath. Gastrointestinal: Negative for abdominal distention, constipation, diarrhea and vomiting. Genitourinary: Negative for difficulty urinating and frequency. Musculoskeletal: Negative for arthralgias and myalgias. Skin: Negative for color change and rash. Neurological: Positive for weakness. Negative for dizziness. Hematological: Negative. Psychiatric/Behavioral: Negative. PASTMEDICAL HISTORY     Past Medical History:   Diagnosis Date    CAD (coronary artery disease)     Cataract     BILAT.  Diabetes mellitus ([address]     Goiter, nontoxic, multinodular     Hearing loss, bilateral     [place] (hard of hearing)     BILAT.  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 Kamari De Dios SINUS SURGERY      TONSILLECTOMY      VASECTOMY       CURRENT MEDICATIONS       Previous Medications    AMLODIPINE (NORVASC) 10 MG TABLET    Take 10 mg by mouth daily. ASPIRIN 81 MG TABLET    Take 81 mg by mouth daily. CARVEDILOL (COREG) 25 MG TABLET    Take 25 mg by mouth 2 times daily (with meals). CHOLECALCIFEROL (VITAMIN D) 2000 UNITS CAPS CAPSULE    Take 1 capsule by mouth daily. CLONIDINE (CATAPRES) 0.1 MG TABLET    Take 0.1 mg by mouth 3 times daily. FINASTERIDE (PROSCAR) 5 MG TABLET    Take 5 mg by mouth daily. LISINOPRIL-HYDROCHLOROTHIAZIDE (PRINZIDE) 20-12.5 MG PER TABLET    Take 1 tablet by mouth 2 times daily. METFORMIN (GLUCOPHAGE) 1000 MG TABLET    Take 1,000 mg by mouth 2 times daily (with meals). MULTIPLE VITAMINS-MINERALS (THERAPEUTIC MULTIVITAMIN-MINERALS) TABLET    Take 1 tablet by mouth daily. POTASSIUM CHLORIDE SA (K-DUR;KLOR-CON M) 10 MEQ TABLET    Take 10 mEq by mouth daily. SIMVASTATIN (ZOCOR) 40 MG TABLET    Take 40 mg by mouth nightly. TAMSULOSIN (FLOMAX) 0.4 MG CAPSULE    Take 0.4 mg by mouth daily. ALLERGIES     is allergic to latex, amoxicillin, and diclofenac. FAMILY HISTORY     has no family status information on file.       SOCIAL HISTORY       Social History     Tobacco Use    Smoking status: Former Smoker     Start date: 1/1/1962  Smokeless tobacco: Never Used   Substance Use Topics    Alcohol use: No    Drug use: No     PHYSICAL EXAM     INITIAL VITALS: /81   Pulse 79   Temp 97.4 °F (36.3 °C) (Oral)   Resp 22   Ht 5' 8\" (1.727 m)   Wt 147 lb (66.7 kg)   SpO2 99%   BMI 22.35 kg/m²    Physical Exam  Constitutional:       General: He is not in acute distress. Appearance: He is well-developed. HENT:      Head: Normocephalic. Eyes:      Pupils: Pupils are equal, round, and reactive to light. Cardiovascular:      Rate and Rhythm: Normal rate and regular rhythm. Heart sounds: Normal heart sounds. Pulmonary:      Effort: Pulmonary effort is normal. No respiratory distress. Breath sounds: Normal breath sounds. Abdominal:      General: Bowel sounds are normal.      Palpations: Abdomen is soft. Tenderness: There is no abdominal tenderness. Musculoskeletal:         General: Normal range of motion. Skin:     General: Skin is warm and dry. Coloration: Skin is pale. Neurological:      Mental Status: He is alert and oriented to person, place, and time. MEDICAL DECISION MAKIN-year-old male presenting to the emergency room after a syncopal episode at [facility]. Patient continues to have some unsteadiness and shakiness upon standing. He has positive orthostatics here in the ED. Patient has pacemaker which was interrogated here in the ED and interrogation did not record any arrhythmias or bradycardic events. Case was discussed with cardiology attending Dr. Janis Baltazar as well as medicine attending Dr. Maria L Wheeler.   Plan is for admission      CRITICAL CARE:       PROCEDURES:    Procedures    DIAGNOSTIC RESULTS   EKG:All EKG's are interpreted by the Emergency Department Physician who either signs or Co-signs this chart in the absence of a cardiologist.    EKG atrial paced rhythm ventricular rate 62  Occasional PVCs  Right bundle branch block  QTc 497    RADIOLOGY:All plain film, CT, MRI, and formal ultrasound images (except ED bedside ultrasound) are read by the radiologist, see reports below, unless otherwisenoted in MDM or here. XR CHEST PORTABLE   Final Result   1. No acute pulmonary disease. 2.  Pulmonary sequela typical of that seen with smoking, including COPD. 3. Calcific atherosclerosis aorta. 4. Cardiomegaly. 5. Old healed fracture proximal right humerus. Correlate with clinical   history and physical findings as I cannot exclude acute fracture at this   level. LABS: All lab results were reviewed by myself, and all abnormals are listed below.   Labs Reviewed   CBC WITH AUTO DIFFERENTIAL - Abnormal; Notable for the following components:       Result Value    RBC 3.71 (*)     Hemoglobin 10.3 (*)     Hematocrit 31.3 (*)     Immature Granulocytes 1 (*)     All other components within normal limits   BASIC METABOLIC PANEL W/ REFLEX TO MG FOR LOW K - Abnormal; Notable for the following components:    Glucose 214 (*)     CREATININE 1.26 (*)     Potassium 3.4 (*)     GFR Non- 55 (*)     All other components within normal limits   TROPONIN - Abnormal; Notable for the following components:    Troponin, High Sensitivity 24 (*)     All other components within normal limits   POC GLUCOSE FINGERSTICK - Abnormal; Notable for the following components:    POC Glucose 200 (*)     All other components within normal limits   MAGNESIUM   TROPONIN   TROPONIN   TROPONIN   TROPONIN       EMERGENCY DEPARTMENTCOURSE:         Vitals:    Vitals:    03/15/22 1900 03/15/22 2020 03/15/22 2040 03/15/22 2106   BP: 129/64 125/66 123/81    Pulse: 70 74 79    Resp: 11 14 16 22   Temp:       TempSrc:       SpO2:       Weight:       Height:           The patient was given the following medications while in the emergency department:  Orders Placed This Encounter   Medications    0.9 % sodium chloride bolus    0.9 % sodium chloride infusion    sodium chloride flush 0.9 % injection 5-40 mL    sodium chloride flush 0.9 % injection 5-40 mL    0.9 % sodium chloride infusion    enoxaparin (LOVENOX) injection 40 mg    acetaminophen (TYLENOL) tablet 650 mg    OR Linked Order Group     ondansetron (ZOFRAN-ODT) disintegrating tablet 4 mg     ondansetron (ZOFRAN) injection 4 mg     CONSULTS:  IP CONSULT TO CARDIOLOGY  IP CONSULT TO INTERNAL MEDICINE    FINAL IMPRESSION      1. Syncope and collapse    2. Orthostatic dizziness          DISPOSITION/PLAN   DISPOSITION Admitted 03/15/2022 09:01:27 PM      PATIENT REFERRED TO:  No follow-up provider specified. DISCHARGE MEDICATIONS:  New Prescriptions    No medications on file     Sven Trujillo MD  Attending Emergency Physician      Care during this encounter was due to an unprecedented national emergency due to COVID-19.            Braxton Fitzgerald MD  03/15/22 2113

## 2022-03-16 ENCOUNTER — APPOINTMENT (OUTPATIENT)
Dept: CARDIAC CATH/INVASIVE PROCEDURES | Age: 82
DRG: 259 | End: 2022-03-16
Payer: MEDICARE

## 2022-03-16 ENCOUNTER — APPOINTMENT (OUTPATIENT)
Dept: GENERAL RADIOLOGY | Age: 82
DRG: 259 | End: 2022-03-16
Payer: MEDICARE

## 2022-03-16 LAB
ABSOLUTE EOS #: 0.08 K/UL (ref 0–0.44)
ABSOLUTE IMMATURE GRANULOCYTE: 0.04 K/UL (ref 0–0.3)
ABSOLUTE LYMPH #: 1.11 K/UL (ref 1.1–3.7)
ABSOLUTE MONO #: 0.86 K/UL (ref 0.1–1.2)
ANION GAP SERPL CALCULATED.3IONS-SCNC: 10 MMOL/L (ref 9–17)
BASOPHILS # BLD: 1 % (ref 0–2)
BASOPHILS ABSOLUTE: 0.04 K/UL (ref 0–0.2)
BUN BLDV-MCNC: 15 MG/DL (ref 8–23)
BUN/CREAT BLD: 16 (ref 9–20)
CALCIUM SERPL-MCNC: 9.1 MG/DL (ref 8.6–10.4)
CHLORIDE BLD-SCNC: 104 MMOL/L (ref 98–107)
CO2: 24 MMOL/L (ref 20–31)
CREAT SERPL-MCNC: 0.93 MG/DL (ref 0.7–1.2)
EKG ATRIAL RATE: 62 BPM
EKG Q-T INTERVAL: 490 MS
EKG QRS DURATION: 156 MS
EKG QTC CALCULATION (BAZETT): 497 MS
EKG R AXIS: 131 DEGREES
EKG T AXIS: 58 DEGREES
EKG VENTRICULAR RATE: 62 BPM
EOSINOPHILS RELATIVE PERCENT: 1 % (ref 1–4)
FERRITIN: 196 UG/L (ref 30–400)
FOLATE: >20 NG/ML
GFR AFRICAN AMERICAN: >60 ML/MIN
GFR NON-AFRICAN AMERICAN: >60 ML/MIN
GFR SERPL CREATININE-BSD FRML MDRD: ABNORMAL ML/MIN/{1.73_M2}
GLUCOSE BLD-MCNC: 117 MG/DL (ref 70–99)
GLUCOSE BLD-MCNC: 130 MG/DL (ref 75–110)
GLUCOSE BLD-MCNC: 133 MG/DL (ref 75–110)
GLUCOSE BLD-MCNC: 145 MG/DL (ref 75–110)
GLUCOSE BLD-MCNC: 210 MG/DL (ref 75–110)
HCT VFR BLD CALC: 27.8 % (ref 40.7–50.3)
HEMOGLOBIN: 9.2 G/DL (ref 13–17)
IMMATURE GRANULOCYTES: 1 %
IRON SATURATION: 16 % (ref 20–55)
IRON: 29 UG/DL (ref 59–158)
LV EF: 65 %
LVEF MODALITY: NORMAL
LYMPHOCYTES # BLD: 13 % (ref 24–43)
MCH RBC QN AUTO: 28 PG (ref 25.2–33.5)
MCHC RBC AUTO-ENTMCNC: 33.1 G/DL (ref 28.4–34.8)
MCV RBC AUTO: 84.8 FL (ref 82.6–102.9)
MONOCYTES # BLD: 10 % (ref 3–12)
NRBC AUTOMATED: 0 PER 100 WBC
PDW BLD-RTO: 12.7 % (ref 11.8–14.4)
PLATELET # BLD: 133 K/UL (ref 138–453)
PMV BLD AUTO: 12 FL (ref 8.1–13.5)
POTASSIUM SERPL-SCNC: 3.2 MMOL/L (ref 3.7–5.3)
RBC # BLD: 3.28 M/UL (ref 4.21–5.77)
SEG NEUTROPHILS: 75 % (ref 36–65)
SEGMENTED NEUTROPHILS ABSOLUTE COUNT: 6.49 K/UL (ref 1.5–8.1)
SODIUM BLD-SCNC: 138 MMOL/L (ref 135–144)
TOTAL IRON BINDING CAPACITY: 184 UG/DL (ref 250–450)
TROPONIN, HIGH SENSITIVITY: 22 NG/L (ref 0–22)
TSH SERPL DL<=0.05 MIU/L-ACNC: 0.65 MIU/L (ref 0.3–5)
UNSATURATED IRON BINDING CAPACITY: 155 UG/DL (ref 112–347)
VITAMIN B-12: 350 PG/ML (ref 232–1245)
WBC # BLD: 8.6 K/UL (ref 3.5–11.3)

## 2022-03-16 PROCEDURE — 2580000003 HC RX 258: Performed by: INTERNAL MEDICINE

## 2022-03-16 PROCEDURE — 84443 ASSAY THYROID STIM HORMONE: CPT

## 2022-03-16 PROCEDURE — 82746 ASSAY OF FOLIC ACID SERUM: CPT

## 2022-03-16 PROCEDURE — 6360000002 HC RX W HCPCS: Performed by: INTERNAL MEDICINE

## 2022-03-16 PROCEDURE — 84484 ASSAY OF TROPONIN QUANT: CPT

## 2022-03-16 PROCEDURE — 7100000001 HC PACU RECOVERY - ADDTL 15 MIN

## 2022-03-16 PROCEDURE — 6370000000 HC RX 637 (ALT 250 FOR IP): Performed by: INTERNAL MEDICINE

## 2022-03-16 PROCEDURE — 2060000000 HC ICU INTERMEDIATE R&B

## 2022-03-16 PROCEDURE — 36415 COLL VENOUS BLD VENIPUNCTURE: CPT

## 2022-03-16 PROCEDURE — 71045 X-RAY EXAM CHEST 1 VIEW: CPT

## 2022-03-16 PROCEDURE — 2500000003 HC RX 250 WO HCPCS

## 2022-03-16 PROCEDURE — 0JH606Z INSERTION OF PACEMAKER, DUAL CHAMBER INTO CHEST SUBCUTANEOUS TISSUE AND FASCIA, OPEN APPROACH: ICD-10-PCS | Performed by: INTERNAL MEDICINE

## 2022-03-16 PROCEDURE — 85025 COMPLETE CBC W/AUTO DIFF WBC: CPT

## 2022-03-16 PROCEDURE — 93970 EXTREMITY STUDY: CPT

## 2022-03-16 PROCEDURE — 83550 IRON BINDING TEST: CPT

## 2022-03-16 PROCEDURE — 33229 REMV&REPLC PM GEN MULT LEADS: CPT

## 2022-03-16 PROCEDURE — 83540 ASSAY OF IRON: CPT

## 2022-03-16 PROCEDURE — 82947 ASSAY GLUCOSE BLOOD QUANT: CPT

## 2022-03-16 PROCEDURE — 93010 ELECTROCARDIOGRAM REPORT: CPT | Performed by: INTERNAL MEDICINE

## 2022-03-16 PROCEDURE — 6360000002 HC RX W HCPCS

## 2022-03-16 PROCEDURE — 93306 TTE W/DOPPLER COMPLETE: CPT

## 2022-03-16 PROCEDURE — 80048 BASIC METABOLIC PNL TOTAL CA: CPT

## 2022-03-16 PROCEDURE — 0JWT0PZ REVISION OF CARDIAC RHYTHM RELATED DEVICE IN TRUNK SUBCUTANEOUS TISSUE AND FASCIA, OPEN APPROACH: ICD-10-PCS | Performed by: INTERNAL MEDICINE

## 2022-03-16 PROCEDURE — 7100000000 HC PACU RECOVERY - FIRST 15 MIN

## 2022-03-16 PROCEDURE — 82728 ASSAY OF FERRITIN: CPT

## 2022-03-16 PROCEDURE — 82607 VITAMIN B-12: CPT

## 2022-03-16 RX ORDER — MEMANTINE HYDROCHLORIDE 10 MG/1
10 TABLET ORAL 2 TIMES DAILY
COMMUNITY

## 2022-03-16 RX ORDER — BETAMETHASONE DIPROPIONATE 0.5 MG/G
CREAM TOPICAL PRN
Status: ON HOLD | COMMUNITY
End: 2022-03-22 | Stop reason: HOSPADM

## 2022-03-16 RX ORDER — FINASTERIDE 5 MG/1
5 TABLET, FILM COATED ORAL DAILY
Status: DISCONTINUED | OUTPATIENT
Start: 2022-03-16 | End: 2022-03-22 | Stop reason: HOSPADM

## 2022-03-16 RX ORDER — CLONIDINE HYDROCHLORIDE 0.1 MG/1
0.1 TABLET ORAL 3 TIMES DAILY
Status: DISCONTINUED | OUTPATIENT
Start: 2022-03-16 | End: 2022-03-22 | Stop reason: HOSPADM

## 2022-03-16 RX ORDER — ATORVASTATIN CALCIUM 20 MG/1
20 TABLET, FILM COATED ORAL DAILY
Status: DISCONTINUED | OUTPATIENT
Start: 2022-03-16 | End: 2022-03-22 | Stop reason: HOSPADM

## 2022-03-16 RX ORDER — POTASSIUM CHLORIDE 7.45 MG/ML
10 INJECTION INTRAVENOUS PRN
Status: DISCONTINUED | OUTPATIENT
Start: 2022-03-16 | End: 2022-03-22 | Stop reason: HOSPADM

## 2022-03-16 RX ORDER — DEXTROSE MONOHYDRATE 50 MG/ML
100 INJECTION, SOLUTION INTRAVENOUS PRN
Status: DISCONTINUED | OUTPATIENT
Start: 2022-03-16 | End: 2022-03-22 | Stop reason: HOSPADM

## 2022-03-16 RX ORDER — DONEPEZIL HYDROCHLORIDE 10 MG/1
10 TABLET, FILM COATED ORAL NIGHTLY
COMMUNITY

## 2022-03-16 RX ORDER — DEXTROSE MONOHYDRATE 25 G/50ML
12.5 INJECTION, SOLUTION INTRAVENOUS PRN
Status: DISCONTINUED | OUTPATIENT
Start: 2022-03-16 | End: 2022-03-22 | Stop reason: HOSPADM

## 2022-03-16 RX ORDER — NICOTINE POLACRILEX 4 MG
15 LOZENGE BUCCAL PRN
Status: DISCONTINUED | OUTPATIENT
Start: 2022-03-16 | End: 2022-03-22 | Stop reason: HOSPADM

## 2022-03-16 RX ORDER — POTASSIUM CHLORIDE 20 MEQ/1
40 TABLET, EXTENDED RELEASE ORAL PRN
Status: DISCONTINUED | OUTPATIENT
Start: 2022-03-16 | End: 2022-03-22 | Stop reason: HOSPADM

## 2022-03-16 RX ADMIN — CLONIDINE HYDROCHLORIDE 0.1 MG: 0.1 TABLET ORAL at 21:21

## 2022-03-16 RX ADMIN — CLONIDINE HYDROCHLORIDE 0.1 MG: 0.1 TABLET ORAL at 12:23

## 2022-03-16 RX ADMIN — SODIUM CHLORIDE, PRESERVATIVE FREE 10 ML: 5 INJECTION INTRAVENOUS at 08:48

## 2022-03-16 RX ADMIN — ENOXAPARIN SODIUM 40 MG: 100 INJECTION SUBCUTANEOUS at 08:48

## 2022-03-16 RX ADMIN — VANCOMYCIN HYDROCHLORIDE 1000 MG: 1 INJECTION, POWDER, LYOPHILIZED, FOR SOLUTION INTRAVENOUS at 17:29

## 2022-03-16 RX ADMIN — FINASTERIDE 5 MG: 5 TABLET, FILM COATED ORAL at 12:23

## 2022-03-16 RX ADMIN — POTASSIUM CHLORIDE 40 MEQ: 20 TABLET, EXTENDED RELEASE ORAL at 08:47

## 2022-03-16 RX ADMIN — ATORVASTATIN CALCIUM 20 MG: 20 TABLET, FILM COATED ORAL at 12:23

## 2022-03-16 RX ADMIN — ACETAMINOPHEN 650 MG: 325 TABLET ORAL at 21:22

## 2022-03-16 ASSESSMENT — PAIN SCALES - GENERAL
PAINLEVEL_OUTOF10: 3
PAINLEVEL_OUTOF10: 0

## 2022-03-16 NOTE — PROGRESS NOTES
Transitions of Care Pharmacy Service   Medication Review    The patient's list of current home medications has been reviewed. Source(s) of information: spoke to patient, sure scripts and express scripts     Based on information provided by the above source(s), I have updated the patient's home med list as described below. I changed or updated the following medications on the patient's home medication list:  Discontinued None      Added Betamethasone  0.05% cream apply topically prn   Donepezil 10 mg- 1 tablet po daily   Memantine 10 mg- 1 tablet po bid      Adjusted   None      Other Notes None            Please feel free to call me with any questions about this encounter. Thank you. This note will be reviewed and co-signed by the [facility] Pharmacist. The pharmacist will review inpatient orders and contact the physician about any discrepancies. Reginald Ruiz, pharmacy technician  Transitions [facility] Pharmacy Service  Phone:  [phone]  Fax: [phone]      Electronically signed by Reginald Ruiz on 3/16/2022 at 12:26 PM       Prior to Admission medications    Medication Sig Start Date End Date Taking? Authorizing Provider   betamethasone dipropionate 0.05 % cream Apply topically as needed Apply topically 2 times daily for 1 week and hold for 1 week       donepezil (ARICEPT) 10 MG tablet Take 10 mg by mouth nightly       memantine (NAMENDA) 10 MG tablet Take 10 mg by mouth 2 times daily       aspirin 81 MG tablet Take 81 mg by mouth daily. carvedilol (COREG) 25 MG tablet Take 25 mg by mouth 2 times daily (with meals). cloNIDine (CATAPRES) 0.1 MG tablet Take 0.1 mg by mouth 3 times daily. tamsulosin (FLOMAX) 0.4 MG capsule Take 0.4 mg by mouth daily. potassium chloride SA (K-DUR;KLOR-CON M) 10 MEQ tablet Take 10 mEq by mouth daily. metFORMIN (GLUCOPHAGE) 1000 MG tablet Take 1,000 mg by mouth 2 times daily (with meals).        Multiple Vitamins-Minerals (THERAPEUTIC MULTIVITAMIN-MINERALS) tablet Take 1 tablet by mouth daily. amLODIPine (NORVASC) 10 MG tablet Take 10 mg by mouth daily. lisinopril-hydrochlorothiazide (PRINZIDE) 20-12.5 MG per tablet Take 1 tablet by mouth 2 times daily. finasteride (PROSCAR) 5 MG tablet Take 5 mg by mouth daily. simvastatin (ZOCOR) 40 MG tablet Take 40 mg by mouth nightly. Cholecalciferol (VITAMIN D) 2000 UNITS CAPS capsule Take 1 capsule by mouth daily.

## 2022-03-16 NOTE — PROGRESS NOTES
Pt up walking to restroom and limping with right leg. States that there is pain behind his right knee that started yesterday. Pt walked back to bed and Dr Marialuisa Fox notified, orders for Venous Dopplers obtained.

## 2022-03-16 NOTE — PROGRESS NOTES
Patient admitted to room 1010 from ED. Vitals stable, telemetry placed. All belongings with patient.

## 2022-03-16 NOTE — CARE COORDINATION
Case Management Initial Discharge Plan  Selina [place],             Met with:patient, spouse & daughter to discuss discharge plans. Information verified: address, contacts, phone number, , insurance Yes  PCP: Becky Wright MD  Date of last visit: Couple Months Ago    Insurance Provider: [employer]     Discharge Planning    Living Arrangements:  Spouse/Significant Other   Support Systems:  Spouse/Significant Other    Home has 2 stories  3 stairs to climb to get into front door, 13 stairs to climb to reach second floor  Location of bedroom/bathroom in home Main Floor    Patient able to perform ADL's:Independent    Current Services (outpatient & in home) None  DME equipment: None  DME provider: N/A    Pharmacy: [facility]. Potential Assistance Needed:  N/A    Patient agreeable to home care: No  [place] of choice provided:  n/a    Prior SNF/Rehab Placement and Facility: No  Agreeable to SNF/Rehab: No  [place] of choice provided: n/a   Evaluation: n/a    Expected Discharge date:  22  Patient expects to be discharged to: Follow Up Appointment: Best Day/ Time: Monday AM    Transportation provider: Family  Transportation arrangements needed for discharge: No    Readmission Risk              Risk of Unplanned Readmission:  15             Does patient have a readmission risk score greater than 14?: Yes  If yes, follow-up appointment must be made within 7 days of discharge. Goal of Care:       Discharge Plan:   Met with patient, spouse, and daughter at bedside to discuss d/c plans. Patient is admitted with orthostatic dizziness, after a syncopal episode at [facility]. Echocardiogram ordered  IV hydration   Plan for pacemaker battery replacement  Lives with spouse and children. Independent. Drives. Retired. Denies any DME or HHC at time of assessment. Consults: Cardio     Continue to follow.          Electronically signed by Swati Monet RN on 3/16/22 at 4:31 PM EDT

## 2022-03-16 NOTE — CONSULTS
Section of Cardiology   Consult Note      Reason for Consult: Syncope  Requesting Physician: Nathan Mac MD    CHIEF COMPLAINT: Syncope    History Obtained From:  patient, electronic medical record, patient's nurse    HISTORY OF PRESENT ILLNESS:      The patient is a 78 y.o. male with significant past medical history of permanent pacemaker who presents with intermittent tach while walking at [facility]. It was witnessed by his wife. The patient did not have preceding symptoms. Denied any nausea or vomiting. No seizure activities. No loss of sphincter control. Denies any bleeding. He admits that he does not drink enough fluid. In the emergency room he was assessed and pacemaker was interrogated which appeared to function normally but it is at end-of-life and need the battery to be replaced. The patient denies any headache or blurred vision. No individual weakness or numbness in any arm or leg. He denies fever or chills or cough or acute illness recently. He lives with his wife. Yesterday I received a call from the ER physician regarding this patient. We checked his orthostatic blood pressure which was abnormal by at least 15mmHg drop upon standing but he remained asymptomatic at that time. Today's orthostatic blood pressure was fairly normal.    Previous diagnostic testing for coronary artery disease includes: echocardiogram, EPS study. Previous history of cardiac disease includes: Permanent pacemaker. Coronary artery disease risk factors include: advanced age (older than 54 for men, 72 for women), diabetes mellitus, hypertension, male gender and sedentary lifestyle. Past Medical History:    Past Medical History:   Diagnosis Date    CAD (coronary artery disease)     Cataract     BILAT.  Diabetes mellitus ([address]     Goiter, nontoxic, multinodular     Hearing loss, bilateral     [place] (hard of hearing)     BILAT.  HEARING AIDS    Hyperlipidemia     Hypertension     Mediastinal mass 2/14/14    MEDIASTINOSCOPY    Osteoarthritis     Peptic ulcer     TIA (transient ischemic attack)     Wears glasses     Wears partial dentures     UPPER     Past Surgical History:    Past Surgical History:   Procedure Laterality Date    BACK SURGERY  2007    LUMBAR LAMI    CARDIAC CATHETERIZATION  [id number]    CARDIAC SURGERY      CARPAL TUNNEL RELEASE Left     COLONOSCOPY      EXTERNAL EAR SURGERY Bilateral 1959    HERNIA REPAIR      UMBILICAL    MASTOID SURGERY  1959    MEDIASTINOSCOPY  2/4/14    PACEMAKER PLACEMENT  1999    replaced 2010;  DR. Dayanara Mac SINUS SURGERY      TONSILLECTOMY      VASECTOMY       Home Medications:  Prior to Admission medications    Medication Sig Start Date End Date Taking? Authorizing Provider   betamethasone dipropionate 0.05 % cream Apply topically as needed Apply topically 2 times daily for 1 week and hold for 1 week   Yes Historical Provider, MD   donepezil (ARICEPT) 10 MG tablet Take 10 mg by mouth nightly   Yes Historical Provider, MD   memantine (NAMENDA) 10 MG tablet Take 10 mg by mouth 2 times daily   Yes Historical Provider, MD   aspirin 81 MG tablet Take 81 mg by mouth daily. Historical Provider, MD   carvedilol (COREG) 25 MG tablet Take 25 mg by mouth 2 times daily (with meals). Historical Provider, MD   cloNIDine (CATAPRES) 0.1 MG tablet Take 0.1 mg by mouth 3 times daily. Historical Provider, MD   tamsulosin (FLOMAX) 0.4 MG capsule Take 0.4 mg by mouth daily. Historical Provider, MD   potassium chloride SA (K-DUR;KLOR-CON M) 10 MEQ tablet Take 10 mEq by mouth daily. Historical Provider, MD   metFORMIN (GLUCOPHAGE) 1000 MG tablet Take 1,000 mg by mouth 2 times daily (with meals). Historical Provider, MD   Multiple Vitamins-Minerals (THERAPEUTIC MULTIVITAMIN-MINERALS) tablet Take 1 tablet by mouth daily. Historical Provider, MD   amLODIPine (NORVASC) 10 MG tablet Take 10 mg by mouth daily.     Historical Provider, MD lisinopril-hydrochlorothiazide (PRINZIDE) 20-12.5 MG per tablet Take 1 tablet by mouth 2 times daily. Historical Provider, MD   finasteride (PROSCAR) 5 MG tablet Take 5 mg by mouth daily. Historical Provider, MD   simvastatin (ZOCOR) 40 MG tablet Take 40 mg by mouth nightly. Historical Provider, MD   Cholecalciferol (VITAMIN D) 2000 UNITS CAPS capsule Take 1 capsule by mouth daily.     Historical Provider, MD     Current Medications:    Current Facility-Administered Medications   Medication Dose Route Frequency Provider Last Rate Last Admin    potassium chloride (KLOR-CON M) extended release tablet 40 mEq  40 mEq Oral PRN Lu Witt MD   40 mEq at 03/16/22 0847    Or    potassium bicarb-citric acid (EFFER-K) effervescent tablet 40 mEq  40 mEq Oral PRN Lu Witt MD        Or   [facility] potassium chloride 10 mEq/100 mL IVPB (Peripheral Line)  10 mEq IntraVENous PRN Lu Witt MD        glucose (GLUTOSE) 40 % oral gel 15 g  15 g Oral PRN Lu Witt MD        dextrose 50 % IV solution  12.5 g IntraVENous PRN Lu Witt MD        glucagon (rDNA) injection 1 mg  1 mg IntraMUSCular PRN Lu Witt MD        dextrose 5 % solution  100 mL/hr IntraVENous PRN Lu Witt MD        cloNIDine (CATAPRES) tablet 0.1 mg  0.1 mg Oral TID Lu Witt MD   0.1 mg at 03/16/22 1223    finasteride (PROSCAR) tablet 5 mg  5 mg Oral Daily Lu Witt MD   5 mg at 03/16/22 1223    atorvastatin (LIPITOR) tablet 20 mg  20 mg Oral Daily Lu Witt MD   20 mg at 03/16/22 1223    vancomycin 1000 mg IVPB in 250 mL D5W addavial  1,000 mg IntraVENous Once Milli Martino MD        sodium chloride flush 0.9 % injection 5-40 mL  5-40 mL IntraVENous 2 times per day Lu Witt MD   10 mL at 03/16/22 0848    sodium chloride flush 0.9 % injection 5-40 mL  5-40 mL IntraVENous PRN Lu Witt MD        0.9 % sodium chloride infusion  25 mL IntraVENous PRN Lu Witt MD  enoxaparin (LOVENOX) injection 40 mg  40 mg SubCUTAneous Daily Zarina Mariee MD   40 mg at 03/16/22 0848    acetaminophen (TYLENOL) tablet 650 mg  650 mg Oral Q4H PRN Zarina Mariee MD        ondansetron (ZOFRAN-ODT) disintegrating tablet 4 mg  4 mg Oral Q8H PRN Zarina Mariee MD        Or    ondansetron (ZOFRAN) injection 4 mg  4 mg IntraVENous Q6H PRN Zarina Mariee MD        0.9 % sodium chloride infusion   IntraVENous Continuous Zarina Mariee MD 50 mL/hr at 03/16/22 0521 Rate Verify at 03/16/22 0521    insulin lispro (HUMALOG) injection vial 0-6 Units  0-6 Units SubCUTAneous TID WC Zarina Mariee MD        insulin lispro (HUMALOG) injection vial 0-3 Units  0-3 Units SubCUTAneous Nightly Zarina Mariee MD         Allergies:  Latex, Amoxicillin, and Diclofenac    Social History:    Social History     Socioeconomic History    Marital status:      Spouse name: Not on file    Number of children: Not on file    Years of education: Not on file    Highest education level: Not on file   Occupational History    Not on file   Tobacco Use    Smoking status: Former Smoker     Start date: 1/1/1962    Smokeless tobacco: Never Used   Substance and Sexual Activity    Alcohol use: No    Drug use: No    Sexual activity: Not on file   Other Topics Concern    Not on file   Social History Narrative    Not on file     Social Determinants of Health     Financial Resource Strain:     Difficulty of Paying Living Expenses: Not on file   Food Insecurity:     Worried About Running Out of Food in the Last Year: Not on file    Eric of Food in the Last Year: Not on file   Transportation Needs:     Lack of Transportation (Medical): Not on file    Lack of Transportation (Non-Medical):  Not on file   Physical Activity:     Days of Exercise per Week: Not on file    Minutes of Exercise per Session: Not on file   Stress:     Feeling of Stress : Not on file   Social Connections:     Frequency of Communication with Friends and Family: Not on file    Frequency of Social Gatherings with Friends and Family: Not on file    Attends Mosque Services: Not on file    Active Member of Clubs or Organizations: Not on file    Attends Club or Organization Meetings: Not on file    Marital Status: Not on file   Intimate Partner Violence:     Fear of Current or Ex-Partner: Not on file    Emotionally Abused: Not on file    Physically Abused: Not on file    Sexually Abused: Not on file   Housing Stability:     Unable to Pay for Housing in the Last Year: Not on file    Number of [place] in the Last Year: Not on file    Unstable Housing in the Last Year: Not on file     Family History:   Family History   Problem Relation Age of Onset    Cancer Father     Cancer Sister     Cancer Brother        · REVIEW OF SYSTEMS   CONSTITUTIONAL: Pertinent information as above    PHYSICAL EXAM:    Vitals:    VITALS:  BP (!) 155/72   Pulse 70   Temp 98.4 °F (36.9 °C) (Oral)   Resp 16   Ht 5' 8\" (1.727 m)   Wt 147 lb (66.7 kg)   SpO2 94%   BMI 22.35 kg/m²   24HR INTAKE/OUTPUT:      Intake/Output Summary (Last 24 hours) at 3/16/2022 1456  Last data filed at 3/16/2022 1245  Gross per 24 hour   Intake 1059.59 ml   Output 820 ml   Net 239.59 ml       CONSTITUTIONAL:  awake, alert, cooperative, no apparent distress, and appears stated age  EYES: Pupils equal, round and reactive to light, extra ocular muscles intact, sclera clear, conjunctiva normal  ENT:  normocepalic, without obvious abnormality  NECK:  supple, symmetrical, trachea midline, no carotid bruit ,   No  JVD  BACK:  symmetric  LUNGS: Non-labored, good air exchange, clear to auscultation bilaterally, no crackles or wheezing  CARDIOVASCULAR:  Normal apical impulse, regular rate and rhythm, normal S1 and S2, no S3 or S4, and no murmur noted, no rub.  radial and bilateralpresent 2+  ABDOMEN:  No scars, normal bowel sounds, soft, non-distended, non-tender,  MUSCULOSKELETAL:  there is no redness, warmth, or swelling of the joints   No leg edema. NEUROLOGIC:  Awake, alert, oriented to name, place and time. SKIN:  no bruising or bleeding, normal skin color, texture, turgor and no jaundice    DATA:   ECG: Atrial paced rhythm at 62 bpm.  Right bundle branch block, PVCs in singles  ECHO: Date:   Not performed to date  Stress Test:  Not performed to date  Angiography:  Not performed to date    Cardiology Labs:No results for input(s): MYOGLOBIN, CKTOTAL, CKMB, CKMBINDEX, TROPONINT, BNP in the last 72 hours. Warfarin PT/INR:No results found for: PROTIME, INR, WARFARIN  CBC:  Lab Results   Component Value Date    WBC 8.6 03/16/2022    RBC 3.28 03/16/2022    HGB 9.2 03/16/2022    HCT 27.8 03/16/2022    MCV 84.8 03/16/2022    MCH 28.0 03/16/2022    MCHC 33.1 03/16/2022    RDW 12.7 03/16/2022     03/16/2022    MPV 12.0 03/16/2022     CMP:  Lab Results   Component Value Date     03/16/2022    K 3.2 03/16/2022     03/16/2022    CO2 24 03/16/2022    BUN 15 03/16/2022    CREATININE 0.93 03/16/2022    GFRAA >60 03/16/2022    LABGLOM >60 03/16/2022    GLUCOSE 117 03/16/2022    CALCIUM 9.1 03/16/2022     Magnesium:    Lab Results   Component Value Date    MG 2.1 03/15/2022     PTT:  No results found for: APTT, PTT  TSH:    Lab Results   Component Value Date    TSH 0.65 03/16/2022     BMP:  Lab Results   Component Value Date     03/16/2022    K 3.2 03/16/2022     03/16/2022    CO2 24 03/16/2022    BUN 15 03/16/2022    LABALBU 4.2 01/31/2014    CREATININE 0.93 03/16/2022    CALCIUM 9.1 03/16/2022    GFRAA >60 03/16/2022    LABGLOM >60 03/16/2022    GLUCOSE 117 03/16/2022     LIVER PROFILE:No results for input(s): AST, ALT, LABALBU, ALKPHOS, BILITOT, BILIDIR, IBILI, PROT, GLOB, ALBUMIN in the last 72 hours. FLP:  No results found for: CHOL, TRIG, HDL, LDLCHOLESTEROL      IMPRESSION  1. syncopal attack, could be vasovagal reaction  2. End-of-life pacemaker battery  3. History of hypertension  4. History of type 2 diabetes mellitus    Patient Active Problem List   Diagnosis    Mass of mediastinum    Bladder cancer ([address]    Orthostatic dizziness           RECOMMENDATIONS:     Continue current medications  Management plan was discussed with patient   I reviewed the pacemaker interrogation strips. I do not think the patient was bradycardic and could be he had a faint pulse and it was miscalculated. Highly likely his pressure dropped at the time of the incident. IV hydration  Echocardiogram  Check orthostatic blood pressure once a day at least  May need pressure stockings  Venous scan was ordered  We will arrange for pacemaker battery replacement this afternoon and Dr. Felix Chapin will be doing it. Discussed with the patient he understood the plan and agreed to proceed.   Thank you for consultation  Discussed with the patient's nurse      Electronically signed by Soto Munguia MD on 3/16/2022 at 2:56 PM     CC: Wilma Bazan MD

## 2022-03-16 NOTE — H&P
History and Physical/ admit notye      CHIEF COMPLAINT: Syncope  History of Present Illness: Witnessed syncope apparently patient was dizzy and diaphoretic denies any chest pain no palpitation, no fever no chills no cough no shortness of breath per EMS heart rate was in the 30s patient noted to have a pacemaker       Past Medical History:   Diagnosis Date    CAD (coronary artery disease)     Cataract     BILAT.  Diabetes mellitus ([address]     Goiter, nontoxic, multinodular     Hearing loss, bilateral     [place] (hard of hearing)     BILAT. HEARING AIDS    Hyperlipidemia     Hypertension     Mediastinal mass 2/14/14    MEDIASTINOSCOPY    Osteoarthritis     Peptic ulcer     TIA (transient ischemic attack)     Wears glasses     Wears partial dentures     UPPER         Past Surgical History:   Procedure Laterality Date    BACK SURGERY  2007    LUMBAR LAMI    CARDIAC CATHETERIZATION  [id number]    CARDIAC SURGERY      CARPAL TUNNEL RELEASE Left     COLONOSCOPY      EXTERNAL EAR SURGERY Bilateral 1959    HERNIA REPAIR      UMBILICAL    MASTOID SURGERY  1959    MEDIASTINOSCOPY  2/4/14    PACEMAKER PLACEMENT  1999 replaced 2010;  DR. [facility] SINUS SURGERY      TONSILLECTOMY      VASECTOMY         Medications Prior to Admission:    Medications Prior to Admission: aspirin 81 MG tablet, Take 81 mg by mouth daily. carvedilol (COREG) 25 MG tablet, Take 25 mg by mouth 2 times daily (with meals). cloNIDine (CATAPRES) 0.1 MG tablet, Take 0.1 mg by mouth 3 times daily. tamsulosin (FLOMAX) 0.4 MG capsule, Take 0.4 mg by mouth daily. potassium chloride SA (K-DUR;KLOR-CON M) 10 MEQ tablet, Take 10 mEq by mouth daily. metFORMIN (GLUCOPHAGE) 1000 MG tablet, Take 1,000 mg by mouth 2 times daily (with meals). Multiple Vitamins-Minerals (THERAPEUTIC MULTIVITAMIN-MINERALS) tablet, Take 1 tablet by mouth daily.   amLODIPine (NORVASC) 10 MG tablet, Take 10 mg by mouth

## 2022-03-16 NOTE — PROGRESS NOTES
Writer attempted to contact daughter David Fleming in emergency contact and wife Marylene Lease, to update on pt status after pacemaker replaced; both phones going straight to voicemail. Will attempt again later.

## 2022-03-16 NOTE — FLOWSHEET NOTE
[facility]  PROGRESS NOTE    Room # STA20/20   Name: Rose Steel              Reason for visit: Routine    I visited the spouse    Admit Date & Time: 3/15/2022  3:40 PM    Assessment:  Rose Steel is a 78 y.o. male. Upon entering the room patient was sleeping. Family member, requests  to visit. Spoke to spouse. No major needs prayers, expressed. Intervention:   provided a ministry presence. Outcome:  Patient did not respond. Plan:  Chaplains will remain available to offer spiritual and emotional support as needed. Electronically signed by Chaplain Marcial, on 3/15/2022 at 8:46 PM.  Ev     03/15/22 2045   Encounter Summary   Services provided to: Family; Patient not available   Referral/Consult From: Family   Support System Family members   Continue Visiting   (3-15-22, PT, sleeping, spoke to spouse)   Complexity of Encounter Low   Length of Encounter 15 minutes   Spiritual Assessment Completed Yes   Routine   Type Initial   Assessment Approachable;Passive   Intervention Explored feelings, thoughts, concerns; Active listening;Discussed belief system/Tem[employer] practices/jennifer;Discussed illness/injury and it's impact;Sustaining presence/ Ministry of presence   Outcome Expressed gratitude;Engaged in conversation;Expressed feelings/needs/concerns

## 2022-03-16 NOTE — PLAN OF CARE
Problem: Falls - Risk of:  Goal: Will remain free from falls  Description: Will remain free from falls  3/16/2022 0908 by Karla Tuttle RN  Outcome: Ongoing

## 2022-03-17 LAB
ABSOLUTE EOS #: 0.14 K/UL (ref 0–0.44)
ABSOLUTE IMMATURE GRANULOCYTE: 0.04 K/UL (ref 0–0.3)
ABSOLUTE LYMPH #: 0.93 K/UL (ref 1.1–3.7)
ABSOLUTE MONO #: 0.84 K/UL (ref 0.1–1.2)
ANION GAP SERPL CALCULATED.3IONS-SCNC: 10 MMOL/L (ref 9–17)
BASOPHILS # BLD: 1 % (ref 0–2)
BASOPHILS ABSOLUTE: 0.03 K/UL (ref 0–0.2)
BUN BLDV-MCNC: 11 MG/DL (ref 8–23)
BUN/CREAT BLD: 12 (ref 9–20)
CALCIUM SERPL-MCNC: 9.1 MG/DL (ref 8.6–10.4)
CHLORIDE BLD-SCNC: 106 MMOL/L (ref 98–107)
CO2: 26 MMOL/L (ref 20–31)
CREAT SERPL-MCNC: 0.91 MG/DL (ref 0.7–1.2)
EOSINOPHILS RELATIVE PERCENT: 2 % (ref 1–4)
GFR AFRICAN AMERICAN: >60 ML/MIN
GFR NON-AFRICAN AMERICAN: >60 ML/MIN
GFR SERPL CREATININE-BSD FRML MDRD: ABNORMAL ML/MIN/{1.73_M2}
GLUCOSE BLD-MCNC: 113 MG/DL (ref 75–110)
GLUCOSE BLD-MCNC: 134 MG/DL (ref 70–99)
GLUCOSE BLD-MCNC: 139 MG/DL (ref 75–110)
GLUCOSE BLD-MCNC: 160 MG/DL (ref 75–110)
GLUCOSE BLD-MCNC: 195 MG/DL (ref 75–110)
HCT VFR BLD CALC: 27.7 % (ref 40.7–50.3)
HEMOCCULT SP1 STL QL: NEGATIVE
HEMOGLOBIN: 9.1 G/DL (ref 13–17)
IMMATURE GRANULOCYTES: 1 %
LYMPHOCYTES # BLD: 15 % (ref 24–43)
MCH RBC QN AUTO: 27.8 PG (ref 25.2–33.5)
MCHC RBC AUTO-ENTMCNC: 32.9 G/DL (ref 28.4–34.8)
MCV RBC AUTO: 84.7 FL (ref 82.6–102.9)
MONOCYTES # BLD: 13 % (ref 3–12)
NRBC AUTOMATED: 0 PER 100 WBC
PDW BLD-RTO: 12.6 % (ref 11.8–14.4)
PLATELET # BLD: 124 K/UL (ref 138–453)
PMV BLD AUTO: 11.3 FL (ref 8.1–13.5)
POTASSIUM SERPL-SCNC: 3.5 MMOL/L (ref 3.7–5.3)
RBC # BLD: 3.27 M/UL (ref 4.21–5.77)
SEG NEUTROPHILS: 69 % (ref 36–65)
SEGMENTED NEUTROPHILS ABSOLUTE COUNT: 4.43 K/UL (ref 1.5–8.1)
SODIUM BLD-SCNC: 142 MMOL/L (ref 135–144)
WBC # BLD: 6.4 K/UL (ref 3.5–11.3)

## 2022-03-17 PROCEDURE — 2060000000 HC ICU INTERMEDIATE R&B

## 2022-03-17 PROCEDURE — 97530 THERAPEUTIC ACTIVITIES: CPT

## 2022-03-17 PROCEDURE — 97162 PT EVAL MOD COMPLEX 30 MIN: CPT

## 2022-03-17 PROCEDURE — 97535 SELF CARE MNGMENT TRAINING: CPT

## 2022-03-17 PROCEDURE — 97166 OT EVAL MOD COMPLEX 45 MIN: CPT

## 2022-03-17 PROCEDURE — C1785 PMKR, DUAL, RATE-RESP: HCPCS

## 2022-03-17 PROCEDURE — 6370000000 HC RX 637 (ALT 250 FOR IP): Performed by: INTERNAL MEDICINE

## 2022-03-17 PROCEDURE — 97116 GAIT TRAINING THERAPY: CPT

## 2022-03-17 PROCEDURE — 82272 OCCULT BLD FECES 1-3 TESTS: CPT

## 2022-03-17 PROCEDURE — 36415 COLL VENOUS BLD VENIPUNCTURE: CPT

## 2022-03-17 PROCEDURE — 80048 BASIC METABOLIC PNL TOTAL CA: CPT

## 2022-03-17 PROCEDURE — 6360000002 HC RX W HCPCS: Performed by: INTERNAL MEDICINE

## 2022-03-17 PROCEDURE — 82947 ASSAY GLUCOSE BLOOD QUANT: CPT

## 2022-03-17 PROCEDURE — 85025 COMPLETE CBC W/AUTO DIFF WBC: CPT

## 2022-03-17 RX ORDER — TAMSULOSIN HYDROCHLORIDE 0.4 MG/1
0.4 CAPSULE ORAL DAILY
Status: DISCONTINUED | OUTPATIENT
Start: 2022-03-17 | End: 2022-03-22 | Stop reason: HOSPADM

## 2022-03-17 RX ORDER — DONEPEZIL HYDROCHLORIDE 10 MG/1
10 TABLET, FILM COATED ORAL NIGHTLY
Status: DISCONTINUED | OUTPATIENT
Start: 2022-03-17 | End: 2022-03-22 | Stop reason: HOSPADM

## 2022-03-17 RX ORDER — MEMANTINE HYDROCHLORIDE 10 MG/1
10 TABLET ORAL 2 TIMES DAILY
Status: DISCONTINUED | OUTPATIENT
Start: 2022-03-17 | End: 2022-03-22 | Stop reason: HOSPADM

## 2022-03-17 RX ORDER — LANOLIN ALCOHOL/MO/W.PET/CERES
325 CREAM (GRAM) TOPICAL
Status: DISCONTINUED | OUTPATIENT
Start: 2022-03-18 | End: 2022-03-22 | Stop reason: HOSPADM

## 2022-03-17 RX ORDER — VITAMIN B COMPLEX
2000 TABLET ORAL DAILY
Status: DISCONTINUED | OUTPATIENT
Start: 2022-03-17 | End: 2022-03-22 | Stop reason: HOSPADM

## 2022-03-17 RX ORDER — ASPIRIN 81 MG/1
81 TABLET ORAL DAILY
Status: DISCONTINUED | OUTPATIENT
Start: 2022-03-17 | End: 2022-03-22 | Stop reason: HOSPADM

## 2022-03-17 RX ORDER — CARVEDILOL 25 MG/1
25 TABLET ORAL 2 TIMES DAILY WITH MEALS
Status: DISCONTINUED | OUTPATIENT
Start: 2022-03-17 | End: 2022-03-22 | Stop reason: HOSPADM

## 2022-03-17 RX ADMIN — INSULIN LISPRO 1 UNITS: 100 INJECTION, SOLUTION INTRAVENOUS; SUBCUTANEOUS at 21:08

## 2022-03-17 RX ADMIN — MEMANTINE 10 MG: 10 TABLET ORAL at 21:08

## 2022-03-17 RX ADMIN — CARVEDILOL 25 MG: 25 TABLET, FILM COATED ORAL at 18:43

## 2022-03-17 RX ADMIN — ENOXAPARIN SODIUM 40 MG: 100 INJECTION SUBCUTANEOUS at 09:54

## 2022-03-17 RX ADMIN — CLONIDINE HYDROCHLORIDE 0.1 MG: 0.1 TABLET ORAL at 10:01

## 2022-03-17 RX ADMIN — CLONIDINE HYDROCHLORIDE 0.1 MG: 0.1 TABLET ORAL at 14:03

## 2022-03-17 RX ADMIN — POTASSIUM CHLORIDE 40 MEQ: 20 TABLET, EXTENDED RELEASE ORAL at 09:54

## 2022-03-17 RX ADMIN — CLONIDINE HYDROCHLORIDE 0.1 MG: 0.1 TABLET ORAL at 21:08

## 2022-03-17 RX ADMIN — TAMSULOSIN HYDROCHLORIDE 0.4 MG: 0.4 CAPSULE ORAL at 18:43

## 2022-03-17 RX ADMIN — ATORVASTATIN CALCIUM 20 MG: 20 TABLET, FILM COATED ORAL at 09:54

## 2022-03-17 RX ADMIN — ACETAMINOPHEN 650 MG: 325 TABLET ORAL at 21:08

## 2022-03-17 RX ADMIN — Medication 2000 UNITS: at 18:42

## 2022-03-17 RX ADMIN — FINASTERIDE 5 MG: 5 TABLET, FILM COATED ORAL at 09:54

## 2022-03-17 RX ADMIN — INSULIN LISPRO 1 UNITS: 100 INJECTION, SOLUTION INTRAVENOUS; SUBCUTANEOUS at 13:05

## 2022-03-17 RX ADMIN — ASPIRIN 81 MG: 81 TABLET, COATED ORAL at 18:42

## 2022-03-17 RX ADMIN — METFORMIN HYDROCHLORIDE 1000 MG: 500 TABLET ORAL at 18:44

## 2022-03-17 RX ADMIN — DONEPEZIL HYDROCHLORIDE 10 MG: 10 TABLET, FILM COATED ORAL at 21:08

## 2022-03-17 ASSESSMENT — PAIN SCALES - GENERAL
PAINLEVEL_OUTOF10: 2
PAINLEVEL_OUTOF10: 0

## 2022-03-17 NOTE — PLAN OF CARE
José Manuel Schmid is resting well this shift with no s/s or c/o pain. Gave PRN Tylenol at [facility] s/p pacemaker placement to good effect. He did c/o cramping in R leg when he attempted to stand; orthostatic BP not obtained r/t fall risk. He is maintaining saturations >90% on RA. IV fluids running at 50mL/hr and patient is voiding continent; has had 750mL out to this point in shift. No sliding scale insulin required at HS. He has call light in reach and bed alarm activated. Safety maintained. Problem: Falls - Risk of:  Goal: Will remain free from falls  Description: Will remain free from falls  Outcome: Ongoing     Problem:  Activity:  Goal: Risk for activity intolerance will decrease  Description: Risk for activity intolerance will decrease  Outcome: Ongoing     Problem: Coping:  Goal: Ability to adjust to condition or change in health will improve  Description: Ability to adjust to condition or change in health will improve  Outcome: Ongoing     Problem: Fluid Volume:  Goal: Will show no signs and symptoms of electrolyte imbalance  Description: Will show no signs and symptoms of electrolyte imbalance  Outcome: Ongoing     Problem: Metabolic:  Goal: Ability to maintain appropriate glucose levels will improve  Description: Ability to maintain appropriate glucose levels will improve  Outcome: Ongoing     Problem: Respiratory:  Goal: Ability to maintain adequate ventilation will improve  Description: Ability to maintain adequate ventilation will improve  Outcome: Ongoing

## 2022-03-17 NOTE — PROGRESS NOTES
IntraVENous 2 times per day    enoxaparin  40 mg SubCUTAneous Daily    insulin lispro  0-6 Units SubCUTAneous TID WC    insulin lispro  0-3 Units SubCUTAneous Nightly       IV Infusions (if any):   dextrose      sodium chloride      sodium chloride Stopped (03/16/22 1612)       Diagnostics:   EKG:   ECHO: reviewed. Ejection fraction: 65%  Stress Test: not obtained. Cardiac Angiography: not obtained. CXR : no pneumo  Labs:   CBC:   Recent Labs     03/16/22  0506 03/17/22  0520   WBC 8.6 6.4   HGB 9.2* 9.1*   HCT 27.8* 27.7*   * 124*     BMP:   Recent Labs     03/16/22  0506 03/17/22  0520    142   K 3.2* 3.5*   CO2 24 26   BUN 15 11   CREATININE 0.93 0.91   LABGLOM >60 >60   GLUCOSE 117* 134*     No results found for: BNP  PT/INR: No results for input(s): PROTIME, INR in the last 72 hours. APTT:No results for input(s): APTT in the last 72 hours. CARDIAC ENZYMES:No results for input(s): CKTOTAL, CKMB, CKMBINDEX, TROPONINT in the last 72 hours. FASTING LIPID PANEL:No results found for: HDL, LDLDIRECT, LDLCALC, TRIG  LIVER PROFILE:No results for input(s): AST, ALT, LABALBU in the last 72 hours. ASSESSMENT:  1. syncopal attack, could be vasovagal reaction  2. End-of-life pacemaker battery  3. History of hypertension  4. History of type 2 diabetes mellitus     Patient Active Problem List   Diagnosis    Mass of mediastinum    Bl[facility] [address]    Orthostatic dizziness       PLAN:    1. Check orthostatic blood pressure again. 2. Cleared to be discharged from cardiac perspective if he remained stable. 3. Explained the findings on his echo. Please see orders. Discussed with patient and nursing.     Mahnaz Haley MD, MD

## 2022-03-17 NOTE — PROGRESS NOTES
Subjective:    Syncope  No syncope no chest pain no palpitation no dizziness  ROS  No fever no chills no GI/ complaints no cardiopulmonary complaints no TIA no bleeding no headache no sore throat no skin lesions no polyuria no polydipsia no hypoglycemia physical exam  General Appearance: in no acute distress and alert  Skin: warm and dry, no rash or erythema  Head: normocephalic and atraumatic  Eyes: sclera anicteric and positive pallor  Neck: neck supple and non tender without mass   Pulmonary/Chest: clear to auscultation bilaterally- no wheezes, rales or rhonchi, normal air movement, no respiratory distress  Cardiovascular: normal rate, regular rhythm, normal S1 and S2, no gallops, intact distal pulses and no carotid bruits  Abdomen: soft, non-tender, non-distended, normal bowel sounds, no masses or organomegaly  Extremities: no edema and good pulses no Homans' sign  Neurologic: Alert oriented x3 with no focal deficit    BP (!) 151/60   Pulse 70   Temp 98.2 °F (36.8 °C) (Oral)   Resp 18   Ht 5' 8\" (1.727 m)   Wt 147 lb (66.7 kg)   SpO2 96%   BMI 22.35 kg/m²     CBC:   Lab Results   Component Value Date    WBC 6.4 03/17/2022    RBC 3.27 03/17/2022    HGB 9.1 03/17/2022    HCT 27.7 03/17/2022    MCV 84.7 03/17/2022    MCH 27.8 03/17/2022    MCHC 32.9 03/17/2022    RDW 12.6 03/17/2022     03/17/2022    MPV 11.3 03/17/2022     BMP:    Lab Results   Component Value Date     03/17/2022    K 3.5 03/17/2022     03/17/2022    CO2 26 03/17/2022    BUN 11 03/17/2022    LABALBU 4.2 01/31/2014    CREATININE 0.91 03/17/2022    CALCIUM 9.1 03/17/2022    GFRAA >60 03/17/2022    LABGLOM >60 03/17/2022    GLUCOSE 134 03/17/2022        Assessment:  Patient Active Problem List   Diagnosis    Mass of mediastinum    Bladder cancer (HCC)    Orthostatic dizziness     Syncope vasovagal   Dehydration   End-of-life battery pacemaker  Hypertension essential blood pressure going up so we will go back on the Coreg  Iron deficiency anemia will start iron supplements stools for occult blood will ask GI to see if they can do the scope as an outpatient  Type 2 diabetes with CKD 2  CKD 2 coronary artery disease      plan:    Meds labs reviewed start back on the Metformin continue with blood sugars before meals and at bedtime with insulin coverage DVT prophylaxis physical therapy occupational therapy we will ask for a walker start iron supplements GI consult      Zarina Mariee MD MD  4:58 PM

## 2022-03-17 NOTE — PLAN OF CARE
Problem: Fluid Volume:  Goal: Will show no signs and symptoms of electrolyte imbalance  Description: Will show no signs and symptoms of electrolyte imbalance  3/17/2022 1534 by Caroline Decker RN  Outcome: Ongoing    Pt continues on IV fluids. VSS, Orthostatic BP/pulse normal and reviewed with cardiology. Potassium replacement given. Pt hopeful for discharge.    3/17/2022 0424 by Jose Daly  Outcome: Ongoing

## 2022-03-17 NOTE — FLOWSHEET NOTE
Patient is awake and alert with his spouse and daughter sitting bedside. Patient is approachable and states that he has Starling Nate on his side. \" Patient engages in conversation and reports that everything is going well and he has not needs. Patient appears to be coping adequately and seems to have adequate family support. Patient expresses appreciation for the visit. Spiritual Care will follow as needed. 03/17/22 1449   Encounter Summary   Services provided to: Patient and family together   Referral/Consult From: Family   Support System Spouse; Children   Continue Visiting   (3/17/22)   Complexity of Encounter Low   Length of Encounter 15 minutes   Spiritual Assessment Completed Yes   Routine   Type Follow up   Assessment Approachable   Intervention Active listening;Explored coping resources;Nurtured hope   Outcome Expressed gratitude

## 2022-03-17 NOTE — PROGRESS NOTES
Dr. Anneliese Guzman updated on repeat orthostatic blood pressures which are normal.  Per Cardiology he may be d/c'd and f/u with his own cardiologist.

## 2022-03-17 NOTE — PROGRESS NOTES
Physical Therapy    Facility/Department: [facility] PROGRESSIVE CARE  Initial Assessment    NAME: Mami Blakely  : 1940  MRN: [id number]    Date of Service: 3/17/2022   HARVINDER Cordero reports patient is medically stable for therapy treatment this date. Chart reviewed prior to treatment and patient is agreeable for therapy. All lines intact and patient positioned comfortably at end of treatment. All patient needs addressed prior to ending therapy session. Per H&P: 57-year-old male presents emergency room after a syncopal episode outside of  [facility]. Wife called EMS. Patient did not have any significant head injury. He does not exactly remember the syncopal episode. Per EMS when they first arrived patient's heart rate was in the 30s. There is no documented EKG of this patient's heart rate is now in the 60s. Patient does have a pacemaker. Patient's cardiologist is Dr. Lorne Stanley. Here in the emergency room he has some generalized weakness and fatigue. He reports no chest pain or difficulty breathing. Discharge Recommendations:  Pt currently functioning below baseline. Would suggest additional therapy at time of discharge to maximize long term outcomes and prevent re-admission. Please refer to AM-PAC score for current level of function. Patient would benefit from continued therapy after discharge     PT Equipment Recommendations  Equipment Needed: Yes  Mobility Devices: Dayanna Champ: Rolling    Assessment   Body structures, Functions, Activity limitations: Decreased functional mobility ; Decreased strength;Decreased safe awareness;Decreased balance;Decreased endurance; Increased pain  Assessment: Pt tolerated PT eval fair. Pt demonstrating poor steadiness throughout ambulation this date w/ RW and skilled 2 person assist.  Pt is a HIGH fall risk d/t hx of falls, decreased balance, gait deficits, and strength deficits.   Pt currently requires skilled 2 person assist for all functional mobility and would benefit from continued use of RW for ambulation at this time. Pt would benefit from continued skilled PT to address these deficits in order to return to [facility]. Prognosis: Good  Decision Making: Medium Complexity  PT Education: Goals;PT Role;Plan of Care;General Safety;Transfer Training;Gait Training  Patient Education: Pt educated on: purpose of acute PT eval, importance of continued mobility throughout admission, general safety awareness, fall risk prevention, safe transfers & ambulation w/ RW, RW safety, and PT POC. Pt verbalized fair understanding. Pt would benefit from continued reinforcement of education. REQUIRES PT FOLLOW UP: Yes  Activity Tolerance  Activity Tolerance: Patient limited by pain       Patient Diagnosis(es): The primary encounter diagnosis was Syncope and collapse. A diagnosis of Orthostatic dizziness was also pertinent to this visit. has a past medical history of CAD (coronary artery disease), Cataract, Diabetes mellitus ([facility] [address] Goiter, nontoxic, multinodular, Hearing loss, bilateral, [place] (hard of hearing), Hyperlipidemia, Hypertension, Mediastinal mass, Osteoarthritis, Peptic ulcer, TIA (transient ischemic attack), Wears glasses, and Wears partial dentures. has a past surgical history that includes Cardiac surgery; Cardiac catheterization (2000,2003,2009); pacemaker placement (1999); Carpal tunnel release (Left); External ear surgery (Bilateral, 1959); sinus surgery; Tonsillectomy; Mastoid surgery (1959); hernia repair; Vasectomy; back surgery (2007); Colonoscopy; and Mediastinoscopy (2/4/14).     Restrictions  Restrictions/Precautions  Restrictions/Precautions: General Precautions,Fall Risk  Required Braces or Orthoses?: No  Implants present? : Pacemaker  Position Activity Restriction  Other position/activity restrictions: Up w/ assist, LUE IV  Vision/Hearing  Vision: Impaired  Vision Exceptions: Wears glasses at all times  Hearing: Exceptions to [facility]  Hearing Exceptions: Hard of hearing/hearing concerns;Bilateral hearing aid     Subjective  General  Patient assessed for rehabilitation services?: Yes  Response To Previous Treatment: Not applicable  Family / Caregiver Present: Yes (Pt's wife and daughter present at bedside throughout session.)  Follows Commands: Within Functional Limits  General Comment  Comments: RN and pt agreeable to therapy. Pt supine in bed upon arrival.  Pt pleasant and cooperative throughout. Subjective  Subjective: Pt reporting feeling \"okay\" at time of PT eval.          Orientation  Orientation  Overall Orientation Status: Within Functional Limits  Social/Functional History  Social/Functional History  Lives With: Spouse  Type of Home: House  Home Layout: Two level,Able to Live on Main level with bedroom/bathroom  Home Access: Stairs to enter with rails  Entrance Stairs - Number of Steps: 2-3  Entrance Stairs - Rails: Both  Bathroom Shower/Tub: Walk-in shower  Bathroom Toilet: Handicap height  Bathroom Equipment: Grab bars in shower,Grab bars around toilet  Home Equipment: Cane,Rolling walker  ADL Assistance: Independent  Homemaking Assistance: Independent (shes household chores with wife, wife does a lot of it)  Ambulation Assistance: Independent (No AD at baseline)  Transfer Assistance: Independent  Active : Yes  Occupation: Retired  Type of occupation: [employer]  Leisure & Hobbies: reading, travel, watch tv, play scrabble  Additional Comments: Pt reports 1 fall in the last 6 months reports \"I just went down\". Conflicting fall hx information provided by pt and pt's family. Pt passed out and fell at [place] prior to admission. Cognition   Cognition  Overall Cognitive Status: Exceptions  Arousal/Alertness: Appropriate responses to stimuli;Delayed responses to stimuli  Following Commands: Follows multistep commands with repitition; Follows multistep commands with increased time  Attention Span: Attends with cues to redirect  Memory: Decreased recall of recent events;Decreased short term memory  Safety Judgement: Decreased awareness of need for assistance;Decreased awareness of need for safety  Problem Solving: Decreased awareness of errors;Assistance required to identify errors made;Assistance required to correct errors made  Insights: Decreased awareness of deficits  Initiation: Does not require cues  Sequencing: Requires cues for some    Objective     Observation/Palpation  Posture: Fair  Observation: redness noted RLE    AROM RLE (degrees)  RLE AROM: WFL  AROM LLE (degrees)  LLE AROM : WFL  AROM RUE (degrees)  RUE AROM : WFL  AROM LUE (degrees)  LUE AROM : WFL  Strength RLE  Strength RLE: WFL  Comment: Grossly 4-/5  Strength LLE  Strength LLE: WFL  Comment: Grossly 4-/5  Strength RUE  Comment: See OT assessment for detail  Strength LUE  Comment: See OT assessment for detail  Tone RLE  RLE Tone: Normotonic  Tone LLE  LLE Tone: Normotonic  Motor Control  Gross Motor?: WFL  Sensation  Overall Sensation Status: WFL (denies numbness/tingling)  Bed mobility  Supine to Sit: Minimal assistance  Sit to Supine: Stand by assistance  Comment: Pt w/o significant difficulty throughout bed mobility this date. Pt requiring Lizette for progression of BLE and trunk throughout sup>sit this date. Orthostatic BPs taken this date. Supine: 144/63 mmHg. Seated 136/66 mmHg. Pt denying any dizziness/lightheadedness throughout. Transfers  Sit to Stand: Minimal Assistance;2 Person Assistance  Stand to sit: Minimal Assistance;2 Person Assistance  Comment: Pt performed 2 STS transfers throughout session this date demonstrating fair steadiness throughout. For second transfer, pt requiring MAX verbal and tactile cueing for proper hand placement throughout transfers w/ RW w/ poor return demo. Pt also demonstrating fair eccentric quad control throughout stand>sit transfers. BP standin/61 mmHg.   Ambulation  Ambulation?: Yes  More Ambulation?: Yes  Ambulation 1  Surface: level tile  Device: No Device  Assistance: Maximum assistance;2 Person assistance  Quality of Gait: antalgic gait, poor steadiness, lateral trunk sway  Gait Deviations: Slow Marisa;Staggers; Shuffles;Decreased step length;Decreased step height  Distance: 15ft  Comments: Pt ambulating w/o AD w/ poor steadiness this date. Pt demonstrating significantly decreased stance time on RLE and requiring skilled 2 person assist for safety throughout. Ambulation 2  Surface - 2: level tile  Device 2: Rolling Walker  Assistance 2: Moderate assistance;2 Person assistance  Quality of Gait 2: poor steadiness, antalgic gait, shuffling steps  Gait Deviations: Slow Marisa;Decreased step length;Decreased step height;Shuffles;Staggers  Distance: 15ft  Comments: Pt ambulating w/ improved balance when using RW however continuing to require skilled 2 person assist for safety throughout. Pt unsteady throughout ambulation.   Stairs/Curb  Stairs?: No     Balance  Posture: Fair  Sitting - Static: Good  Sitting - Dynamic: Good;-  Standing - Static: Fair;+  Standing - Dynamic: Poor  Single Leg Stance R Le  Single Leg Stance L Le  Comments: Standing balance assessed w/ RW        Plan   Plan  Times per week: 1-2x/day, 5-6x/week  Current Treatment Recommendations: Strengthening,Balance Training,Functional Mobility Training,Stair training,Gait Training,Transfer Training,Endurance Training,Safety Education & Training,Home Exercise Program,Equipment Evaluation, Education, & procurement,Patient/Caregiver Education & Training,Pain Management  Safety Devices  Type of devices: Bed alarm in place,Call light within reach,Gait belt,Nurse notified,Left in bed  Restraints  Initially in place: No    AM-PAC Score  AM-PAC Inpatient Mobility Raw Score : 14 (22 1350)  AM-PAC Inpatient T-Scale Score : 38.1 (22 135)  Mobility Inpatient CMS 0-100% Score: 61.29 (220)  Mobility Inpatient CMS G-Code Modifier : CL (22)       Functional Outcome Measure-   Single Leg Stance Test:  0 sec. (<5 sec.= fall risk)    Goals  Short term goals  Time Frame for Short term goals: 12 visits  Short term goal 1: Pt to demonstrate bed mobility Elmo  Short term goal 2: Pt to perform STS transfers w/ least restrictive AD Elmo  Short term goal 3: Pt to ambulate at least 100ft w/ least restrictive AD ModA  Short term goal 4: Pt to actively participate in at least 30 minutes of physical therapy for ther act, ther ex, balance, gait, and endurance training  Patient Goals   Patient goals : To go home       Therapy Time   Individual Concurrent Group Co-treatment   Time In 1135         Time Out 1214         Minutes 39           Treatment time: 28 minutes       Co-treatment with OT warranted secondary to decreased safety and independence requiring 2 skilled therapy professionals to address individual discipline's goals.      Ciara Marquis, PT

## 2022-03-17 NOTE — PROGRESS NOTES
Occupational Therapy   Occupational Therapy Initial Assessment  Date: 3/17/2022   Patient Name: Ivan Lorenzo  MRN: [id number]     : 1940    HARVINDER Celis  reports patient is medically stable for therapy treatment this date. Chart reviewed prior to treatment and patient is agreeable for therapy. All lines intact and patient positioned comfortably at end of treatment. All patient needs addressed prior to ending therapy session. Date of Service: 3/17/2022    Discharge Recommendations:  Patient would benefit from continued therapy after discharge   Pt currently functioning below baseline. Would suggest additional therapy at time of discharge to maximize long term outcomes and prevent re-admission. Please refer to AM-PAC score for current level of function. OT Equipment Recommendations  Equipment Needed: Yes  Mobility Devices: ADL Assistive Devices  ADL Assistive Devices: Long-handled [place];Long-handled Sponge;Reacher;Sock-Aid Hard;Emergency Alert System    Assessment   Performance deficits / Impairments: Decreased functional mobility ; Decreased ADL status; Decreased safe awareness;Decreased cognition;Decreased strength;Decreased endurance;Decreased fine motor control;Decreased high-level IADLs;Decreased posture;Decreased balance  Assessment: Skilled OT services are currently recommended to increase I and safety during functional tasks to return hoem at [facility] as able. Prognosis: Good  Decision Making: Medium Complexity  OT Education: OT Role;Transfer Training;Plan of Care;Energy Conservation; ADL Adaptive Strategies;Precautions  Patient Education: safety in function, fall prevention/call light use, recommendations for continued therapy, benefits of being oob, pursed lip breathing  REQUIRES OT FOLLOW UP: Yes  Activity Tolerance  Activity Tolerance: Patient Tolerated treatment well  Activity Tolerance: fair  Safety Devices  Safety Devices in place: Yes  Type of devices: Nurse notified;Gait belt;Bed alarm in place;Call light within reach; Patient at risk for falls; Left in bed           Patient Diagnosis(es): The primary encounter diagnosis was Syncope and collapse. A diagnosis of Orthostatic dizziness was also pertinent to this visit. has a past medical history of CAD (coronary artery disease), Cataract, Diabetes mellitus ([address] Goiter, nontoxic, multinodular, Hearing loss, bilateral, [place] (hard of hearing), Hyperlipidemia, Hypertension, Mediastinal mass, Osteoarthritis, Peptic ulcer, TIA (transient ischemic attack), Wears glasses, and Wears partial dentures. has a past surgical history that includes Cardiac surgery; Cardiac catheterization (2000,2003,2009); pacemaker placement (1999); Carpal tunnel release (Left); External ear surgery (Bilateral, 1959); sinus surgery; Tonsillectomy; Mastoid surgery (1959); hernia repair; Vasectomy; back surgery (2007); Colonoscopy; and Mediastinoscopy (2/4/14). PER H&P: 78-year-old male presents emergency room after a syncopal episode outside of [facility]. Wife called EMS. Mary Haque did not have any significant head injury. Stephen Molina does not exactly remember the syncopal episode.  Per EMS when they first arrived patient's heart rate was in the 30s.  There is no documented EKG of this patient's heart rate is now in the 60s.  Patient does have a pacemaker.  Patient's cardiologist is Dr. Gee Fisher in the emergency room he has some generalized weakness and fatigue.  He reports no chest pain or difficulty breathing.       Restrictions  Restrictions/Precautions  Restrictions/Precautions: General Precautions,Fall Risk  Required Braces or Orthoses?: No  Implants present? : Pacemaker  Position Activity Restriction  Other position/activity restrictions: Up w/ assist, LUE IV, ALARMS, NPO, ortho statics    Subjective   General  Chart Reviewed: Yes  Patient assessed for rehabilitation services?: Yes  Family / Caregiver Present: Yes (daughter and wife in room)  Subjective  Subjective: \"oh I was just about to nap\"  General Comment  Comments: Pt resting in bed, agreeable to OT eval  Patient Currently in Pain: Denies        Social/Functional History  Social/Functional History  Lives With: Spouse  Type of Home: House  Home Layout: Two level,Able to Live on Main level with bedroom/bathroom  Home Access: Stairs to enter with rails  Entrance Stairs - Number of Steps: 2-3  Entrance Stairs - Rails: Both  Bathroom Shower/Tub: Walk-in shower  Bathroom Toilet: Handicap height  Bathroom Equipment: Grab bars in shower,Grab bars around toilet  Home Equipment: Cane,Rolling walker  ADL Assistance: [address]: Independent (shes household chores with wife, wife does a lot of it)  Ambulation Assistance: Independent (No AD at baseline)  Transfer Assistance: Independent  Active : Yes  Occupation: Retired  Type of occupation: [employer]  Leisure & Hobbies: reading, travel, watch tv, play scrabble  Additional Comments: Pt reports 1 fall in the last 6 months reports \"I just went down\". Conflicting fall hx information provided by pt and pt's family. Pt passed out and fell at [facility] prior to admission. Objective   Vision: Impaired  Vision Exceptions: Wears glasses at all times  Hearing: Exceptions to [facility]  Hearing Exceptions: Hard of hearing/hearing concerns;Bilateral hearing aid    Orientation  Overall Orientation Status: Within Functional Limits  Observation/Palpation  Posture: Fair  Observation: redness noted RLE       Balance  Sitting Balance: Stand by assistance  Standing Balance: Moderate assistance (Mod-Max x2 staff assist)  Standing Balance  Time: standing ~ 2-3 min  Activity: functional mobility and taking BP  Functional Mobility  Functional - Mobility Device: Rolling Walker  Activity:  (inital walk bed to window and back (no AD), second walk bed to window and back (with AD))  Assist Level:  Moderate assistance  Functional Mobility Comments: Initial walk pt required MAX x2 staff assist without RW, second walk pt used RW and required Mod x2 staff assist. Pt with antalgic gait, limp on R LE unsteady. Pt required Mod verbal cues/tactile assist for upright posture, pacing self, RW safety, scanning environment and line mgmt all to increase safety. Toilet Transfers  Toilet Transfer: Unable to assess  Toilet Transfers Comments: Pt with no needs at this time       ADL  Feeding: Setup  Grooming: Setup;Stand by assistance  UE Bathing: Setup;Minimal assistance  LE Bathing: Setup; Moderate assistance  UE Dressing: Setup;Minimal assistance  LE Dressing: Setup; Moderate assistance  Toileting: Moderate assistance       Tone RUE  RUE Tone: Normotonic  Tone LUE  LUE Tone: Normotonic  Coordination  Movements Are Fluid And Coordinated: Yes          Bed mobility  Supine to Sit: Minimal assistance  Sit to Supine: Stand by assistance  Comment: Pt completed bed mobility without difficulites throughout bed mobility this date. Min verbal cues given for pacing self, and line mgmt. Orthostatic BPs taken this date. Supine: 144/63 mmHg. Seated 136/66 mmHg. Pt denying any dizziness/lightheadedness throughout. Transfers  Sit to stand: Minimal assistance;2 Person assistance (with RW)  Stand to sit: Minimal assistance;2 Person assistance  Transfer Comments: Pt given Min verbal cues for RW safety, upright posture, squaring self/AD up to surface and reaching back prior to sitting, pacing self and line mgmt all to increase safety. BP taken in standing after 2 min of Standing : 133/61 mmHg          Cognition  Overall Cognitive Status: Exceptions  Arousal/Alertness: Appropriate responses to stimuli;Delayed responses to stimuli  Following Commands: Follows multistep commands with repitition; Follows multistep commands with increased time  Attention Span: Attends with cues to redirect  Memory: Decreased recall of recent events;Decreased short term memory  Safety Judgement: Decreased awareness of need for assistance;Decreased awareness of need for safety  Problem Solving: Decreased awareness of errors;Assistance required to identify errors made;Assistance required to correct errors made  Insights: Decreased awareness of deficits  Initiation: Does not require cues  Sequencing: Requires cues for some        Sensation  Overall Sensation Status: WFL (denies numbness/tingling)        LUE AROM (degrees)  LUE AROM : WFL  RUE AROM (degrees)  RUE AROM : WFL  LUE Strength  Gross LUE Strength: WFL  LUE Strength Comment: ~ 4-/5  RUE Strength  Gross RUE Strength: WFL  RUE Strength Comment: ~ 4-/5             Plan   Plan  Times per week: 4-5x/wk 1x/day as chelsi  Current Treatment Recommendations: Strengthening,Endurance Training,Functional Mobility Training,Balance Training,Safety Education & Training,Home Management Training,Self-Care / ADL,Equipment Evaluation, Education, & procurement,Patient/Caregiver Education & Training                          AM-PAC Score:15              Goals  Short term goals  Time Frame for Short term goals: By discharge, pt to demo  Short term goal 1: ADL tranfers and functional mobility to CGA with use of AD as needed. Short term goal 2: increased B UE strength by 1/2 grade to assist with functional tasks/I with simple B UE HEP with use of handouts as needed. Short term goal 3: bed mobility to Mod I with use of bedrails as needed. Short term goal 4: UB ADLs to Set up and LB ADLs to SBA with use of AD as needed. Short term goal 5: toileting to CGA with use of AD/grab bars as needed. Long term goals  Long term goal 1: Pt to be I with fall prevention delaney, JO ANN/CRISTIAN tech, recommendations for discharge with use of handouts as needed. Patient Goals   Patient goals : To go home!        Therapy Time   Individual Concurrent Group Co-treatment   Time In 1135         Time Out 1214         Minutes 39          tx time: 28 min    Co-treatment with PT warranted secondary to decreased safety and independence requiring 2 skilled therapy professionals to address individual discipline's goals.           Omid Madison, OT

## 2022-03-18 LAB
ABSOLUTE EOS #: 0.15 K/UL (ref 0–0.44)
ABSOLUTE IMMATURE GRANULOCYTE: 0.03 K/UL (ref 0–0.3)
ABSOLUTE LYMPH #: 1.19 K/UL (ref 1.1–3.7)
ABSOLUTE MONO #: 0.76 K/UL (ref 0.1–1.2)
ANION GAP SERPL CALCULATED.3IONS-SCNC: 9 MMOL/L (ref 9–17)
BASOPHILS # BLD: 1 % (ref 0–2)
BASOPHILS ABSOLUTE: 0.04 K/UL (ref 0–0.2)
BUN BLDV-MCNC: 13 MG/DL (ref 8–23)
BUN/CREAT BLD: 15 (ref 9–20)
CALCIUM SERPL-MCNC: 9 MG/DL (ref 8.6–10.4)
CHLORIDE BLD-SCNC: 105 MMOL/L (ref 98–107)
CO2: 26 MMOL/L (ref 20–31)
CREAT SERPL-MCNC: 0.89 MG/DL (ref 0.7–1.2)
EOSINOPHILS RELATIVE PERCENT: 3 % (ref 1–4)
GFR AFRICAN AMERICAN: >60 ML/MIN
GFR NON-AFRICAN AMERICAN: >60 ML/MIN
GFR SERPL CREATININE-BSD FRML MDRD: ABNORMAL ML/MIN/{1.73_M2}
GLUCOSE BLD-MCNC: 119 MG/DL (ref 75–110)
GLUCOSE BLD-MCNC: 127 MG/DL (ref 75–110)
GLUCOSE BLD-MCNC: 128 MG/DL (ref 70–99)
GLUCOSE BLD-MCNC: 134 MG/DL (ref 75–110)
GLUCOSE BLD-MCNC: 143 MG/DL (ref 75–110)
HCT VFR BLD CALC: 26.6 % (ref 40.7–50.3)
HEMOGLOBIN: 8.7 G/DL (ref 13–17)
IMMATURE GRANULOCYTES: 1 %
LYMPHOCYTES # BLD: 21 % (ref 24–43)
MCH RBC QN AUTO: 28.1 PG (ref 25.2–33.5)
MCHC RBC AUTO-ENTMCNC: 32.7 G/DL (ref 28.4–34.8)
MCV RBC AUTO: 85.8 FL (ref 82.6–102.9)
MONOCYTES # BLD: 13 % (ref 3–12)
NRBC AUTOMATED: 0 PER 100 WBC
PDW BLD-RTO: 12.7 % (ref 11.8–14.4)
PLATELET # BLD: 120 K/UL (ref 138–453)
PMV BLD AUTO: 11.7 FL (ref 8.1–13.5)
POTASSIUM SERPL-SCNC: 3.4 MMOL/L (ref 3.7–5.3)
RBC # BLD: 3.1 M/UL (ref 4.21–5.77)
SEG NEUTROPHILS: 62 % (ref 36–65)
SEGMENTED NEUTROPHILS ABSOLUTE COUNT: 3.51 K/UL (ref 1.5–8.1)
SODIUM BLD-SCNC: 140 MMOL/L (ref 135–144)
WBC # BLD: 5.7 K/UL (ref 3.5–11.3)

## 2022-03-18 PROCEDURE — 6360000002 HC RX W HCPCS: Performed by: INTERNAL MEDICINE

## 2022-03-18 PROCEDURE — 80048 BASIC METABOLIC PNL TOTAL CA: CPT

## 2022-03-18 PROCEDURE — 97535 SELF CARE MNGMENT TRAINING: CPT

## 2022-03-18 PROCEDURE — APPNB30 APP NON BILLABLE TIME 0-30 MINS: Performed by: NURSE PRACTITIONER

## 2022-03-18 PROCEDURE — 2580000003 HC RX 258: Performed by: INTERNAL MEDICINE

## 2022-03-18 PROCEDURE — 99254 IP/OBS CNSLTJ NEW/EST MOD 60: CPT | Performed by: INTERNAL MEDICINE

## 2022-03-18 PROCEDURE — 97116 GAIT TRAINING THERAPY: CPT

## 2022-03-18 PROCEDURE — 97110 THERAPEUTIC EXERCISES: CPT

## 2022-03-18 PROCEDURE — 82947 ASSAY GLUCOSE BLOOD QUANT: CPT

## 2022-03-18 PROCEDURE — 85025 COMPLETE CBC W/AUTO DIFF WBC: CPT

## 2022-03-18 PROCEDURE — 6370000000 HC RX 637 (ALT 250 FOR IP): Performed by: INTERNAL MEDICINE

## 2022-03-18 PROCEDURE — 2060000000 HC ICU INTERMEDIATE R&B

## 2022-03-18 PROCEDURE — 36415 COLL VENOUS BLD VENIPUNCTURE: CPT

## 2022-03-18 RX ADMIN — CLONIDINE HYDROCHLORIDE 0.1 MG: 0.1 TABLET ORAL at 20:36

## 2022-03-18 RX ADMIN — FERROUS SULFATE TAB EC 325 MG (65 MG FE EQUIVALENT) 325 MG: 325 (65 FE) TABLET DELAYED RESPONSE at 09:45

## 2022-03-18 RX ADMIN — CARVEDILOL 25 MG: 25 TABLET, FILM COATED ORAL at 16:49

## 2022-03-18 RX ADMIN — POTASSIUM CHLORIDE 40 MEQ: 20 TABLET, EXTENDED RELEASE ORAL at 09:49

## 2022-03-18 RX ADMIN — CARVEDILOL 25 MG: 25 TABLET, FILM COATED ORAL at 09:44

## 2022-03-18 RX ADMIN — INSULIN LISPRO 1 UNITS: 100 INJECTION, SOLUTION INTRAVENOUS; SUBCUTANEOUS at 12:52

## 2022-03-18 RX ADMIN — SODIUM CHLORIDE, PRESERVATIVE FREE 10 ML: 5 INJECTION INTRAVENOUS at 20:36

## 2022-03-18 RX ADMIN — TAMSULOSIN HYDROCHLORIDE 0.4 MG: 0.4 CAPSULE ORAL at 09:45

## 2022-03-18 RX ADMIN — MEMANTINE 10 MG: 10 TABLET ORAL at 09:45

## 2022-03-18 RX ADMIN — DONEPEZIL HYDROCHLORIDE 10 MG: 10 TABLET, FILM COATED ORAL at 20:37

## 2022-03-18 RX ADMIN — METFORMIN HYDROCHLORIDE 1000 MG: 500 TABLET ORAL at 09:44

## 2022-03-18 RX ADMIN — ENOXAPARIN SODIUM 40 MG: 100 INJECTION SUBCUTANEOUS at 09:44

## 2022-03-18 RX ADMIN — CLONIDINE HYDROCHLORIDE 0.1 MG: 0.1 TABLET ORAL at 12:52

## 2022-03-18 RX ADMIN — SODIUM CHLORIDE, PRESERVATIVE FREE 10 ML: 5 INJECTION INTRAVENOUS at 09:45

## 2022-03-18 RX ADMIN — FINASTERIDE 5 MG: 5 TABLET, FILM COATED ORAL at 09:45

## 2022-03-18 RX ADMIN — Medication 2000 UNITS: at 09:44

## 2022-03-18 RX ADMIN — ATORVASTATIN CALCIUM 20 MG: 20 TABLET, FILM COATED ORAL at 09:45

## 2022-03-18 RX ADMIN — CLONIDINE HYDROCHLORIDE 0.1 MG: 0.1 TABLET ORAL at 09:45

## 2022-03-18 RX ADMIN — METFORMIN HYDROCHLORIDE 1000 MG: 500 TABLET ORAL at 16:48

## 2022-03-18 RX ADMIN — ASPIRIN 81 MG: 81 TABLET, COATED ORAL at 09:44

## 2022-03-18 RX ADMIN — MEMANTINE 10 MG: 10 TABLET ORAL at 20:36

## 2022-03-18 ASSESSMENT — PAIN SCALES - GENERAL: PAINLEVEL_OUTOF10: 0

## 2022-03-18 NOTE — PROGRESS NOTES
Physical Therapy  Facility/Department: [facility] PROGRESSIVE CARE  Daily Treatment Note  NAME: Shelly Valiente  : 1940  MRN: [id number]    Date of Service: 3/18/2022    Discharge Recommendations:  Patient would benefit from continued therapy after discharge     Pt currently functioning below baseline. Would suggest additional therapy at time of discharge to maximize long term outcomes and prevent re-admission. Please refer to AM-PAC score for current level of function. Assessment   Body structures, Functions, Activity limitations: Decreased functional mobility ; Decreased strength;Decreased safe awareness;Decreased balance;Decreased endurance; Increased pain  Assessment: Patient with global deconditioning requires MOD x2 with RW for gait and mobility tasks. Due to decreased safety awareness patient at a HIGH risk for falls. Patient would benefit from continued skilled PT to maximize safe functional mobility. Prognosis: Good  Decision Making: Medium Complexity  PT Education: Goals;PT Role;Plan of Care;General Safety;Transfer Training;Gait Training  Patient Education: Patient educated on safety awareness and proper positioning and use for RW. Activity Tolerance  Activity Tolerance: Patient limited by pain     Patient Diagnosis(es): The primary encounter diagnosis was Syncope and collapse. A diagnosis of Orthostatic dizziness was also pertinent to this visit. has a past medical history of CAD (coronary artery disease), Cataract, Diabetes mellitus ([address] Goiter, nontoxic, multinodular, Hearing loss, bilateral, [place] (hard of hearing), Hyperlipidemia, Hypertension, Mediastinal mass, Osteoarthritis, Peptic ulcer, TIA (transient ischemic attack), Wears glasses, and Wears partial dentures. has a past surgical history that includes Cardiac surgery; Cardiac catheterization (,,); pacemaker placement (); Carpal tunnel release (Left); External ear surgery (Bilateral, ); sinus surgery;  Tonsillectomy; Mastoid surgery (1959); hernia repair; Vasectomy; back surgery (2007); Colonoscopy; and Mediastinoscopy (2/4/14). Restrictions  Restrictions/Precautions  Restrictions/Precautions: General Precautions,Fall Risk  Required Braces or Orthoses?: No  Implants present? : Pacemaker  Position Activity Restriction  Other position/activity restrictions: Up w/ assist, LUE IV, ALARMS, NPO, ortho statics  Subjective   General  Chart Reviewed: Yes  Response To Previous Treatment: Not applicable  Family / Caregiver Present: Yes  Subjective  Subjective: Patient agreeable to PT treatment  General Comment  Comments: RN and pt agreeable to therapy. Pt supine in bed upon arrival.  Pt pleasant and cooperative throughout. Orientation  Orientation  Overall Orientation Status: Within Functional Limits  Cognition      Objective   Bed mobility  Rolling to Left: Minimal assistance  Rolling to Right: Minimal assistance  Supine to Sit: Minimal assistance  Sit to Supine: Minimal assistance  Comment: Patient with vitals as follows:  Supine at rest 150\76 Map 98 Pulse 73. Seated /124 Map 139 pulse 75. Seated EOB following 5 minute rest break 105/90 Map 96 pulse 98 bbp. Standing 150/105 Map 119 pulse 69bbp. Seated in side chair following 5 minute rest break 136/59 71 Map pulse 69bbm. In bed mobility tasks patient requries a MIN x1 for supine to sit for motion initiation and Upper body support. Upon seated EOB patient denies SOB, lightheadedness, and dizziness. Patient requries MOD vc's to scoot all the way out and place feet flat on the floor for increased support and stability. Transfers  Sit to Stand: Minimal Assistance;2 Person Assistance  Stand to sit: Minimal Assistance;2 Person Assistance  Bed to Chair: Minimal assistance;2 Person Assistance  Comment: Patient fatigues quickly with minimal activity. Patient requires increased assist for support and RW navigation throughout treatment.   Patient educated on the importance of slow controlled decent into chair to promote eccentric quad control in sit to stand activity. Ambulation  Ambulation?: Yes  More Ambulation?: Yes  Ambulation 1  Surface: level tile  Device: Rolling Walker  Assistance: Moderate assistance;2 Person assistance  Quality of Gait: antalgic gait, poor steadiness, lateral trunk sway  Gait Deviations: Slow Marisa;Staggers; Shuffles;Decreased step length;Decreased step height  Distance: 20 feet  Comments: Patient with difficulty bearing weight down through R LE and demo's antalgic gait on that side. Reduced time in stance on R LE. Patient requries increased time and MOD vc's for positioning with assistive device as patient allow device to become too far away for safety. Neuromuscular Education  NDT Treatment: Gait ;Lower extremity; Sitting;Standing  Balance  Posture: Fair  Sitting - Static: Good  Sitting - Dynamic: Good;-  Standing - Static: Fair;+  Standing - Dynamic: Poor  Comments: Standing balance assessed w/ RW  Exercises  Hip Abduction: 10 x1 seated  Knee Long Arc Quad: 10 x1 seated  Ankle Pumps: 10 x1 seated  Comments: Patient performs on DIANNE LE AROM to promote proper joint kinematics and promote tissue extensibility. AM-PAC Score  AM-PAC Inpatient Mobility Raw Score : 14 (03/18/22 1114)  AM-PAC Inpatient T-Scale Score : 38.1 (03/18/22 1114)  Mobility Inpatient CMS 0-100% Score: 61.29 (03/18/22 1114)  Mobility Inpatient CMS G-Code Modifier : CL (03/18/22 1114)          Goals  Short term goals  Time Frame for Short term goals: 12 visits  Short term goal 1: Pt to demonstrate bed mobility Elmo  Short term goal 2: Pt to perform STS transfers w/ least restrictive AD Elmo  Short term goal 3: Pt to ambulate at least 100ft w/ least restrictive AD ModA  Short term goal 4: Pt to actively participate in at least 30 minutes of physical therapy for ther act, ther ex, balance, gait, and endurance training  Patient Goals   Patient goals :  To go home    Plan Plan  Times per week: 1-2x/day, 5-6x/week  Current Treatment Recommendations: Strengthening,Balance Training,Functional Mobility Training,Stair training,Gait Training,Transfer Training,Endurance Training,Safety Education & Training,Home Exercise Program,Equipment Evaluation, Education, & procurement,Patient/Caregiver Education & Training,Pain Management  Safety Devices  Type of devices: Left in chair,Chair alarm in place,Nurse notified,All fall risk precautions in place,Gait belt  Restraints  Initially in place: No     Therapy Time   Individual Concurrent Group Co-treatment   Time In 1         Time Out 0939         Minutes 34              Co-treatment with OT warranted secondary to decreased safety and independence requiring 2 skilled therapy professionals to address individual discipline's goals. PT addressing pre gait trunk strengthening, weight shifting prior to transfers, transfer training and postural control in sitting.     Cody Madrid, PTA

## 2022-03-18 NOTE — CARE COORDINATION
Discharge planning    Patient chart reviewed. Appreciate prior CM assessment. Per CM patient lives with spouse. Independent, retired and drives. Uses no DME in the home. Admitted with orthostatic BP and cardiology is following. They have cleared for discharge. GI has been consulted for iron deficiency anemia. Therapy is recommending snf. They noted distance of 15 feet. Notes 2 people assist for safety and RW did improve balance. Attending ordered walker per epic and will discuss with patient today. HC vs snf and if agreeable to walker. 1150. SS spoke with family. Declined snf  Asking for home care with choices: [facility] or [facility]. [facility] to check on staffing. Sent rx for walker to [facility] to deliver to room     [facility] in [facility]     [facility] has accepted. 1620  Notified per Dorita Eisenberg RN that family Lety Duvall called and very upset about discharge. Concern regarding getting home with transportation. Daughter Lety Duvall stated that no one can drive. Explained that can set up transport for patient to come home if needed. Will follow up with attending.

## 2022-03-18 NOTE — CARE COORDINATION
Social Work-Contacted patient's wife and daughter regarding short term SNF for rehab. They are agreeable. Offered choice of SNF. They prefer [facility].  Sent referral. Yessi Tellez

## 2022-03-18 NOTE — CARE COORDINATION
Social work: Noted PT/OT recommendation of SNF, patient declines. Writer revisited with spouse and dtr in the room, continues to decline snf. Plan will be home with [facility]., referral to [facility] and needs r/w.

## 2022-03-18 NOTE — CONSULTS
Gastroenterology Consult Note      Patient: Deb Sandhu  : 1940  Acct#:  [de-identified]     Date:  3/18/2022    Subjective:       History of Present Illness  Patient is a 78 y.o.  male admitted with Syncope and collapse [R55]  Orthostatic dizziness [R42] who is seen in consult for Anemia    This is an elderly gentleman with medical history of coronary artery disease, TIA and goiter, peptic ulcer disease, he underwent pacemaker generator replacement in the pocket revision on 3/16/2022. We will consulted because he is anemic  As his hemoglobin is been 8.7, he does have history of iron deficiency anemia, but is not on iron replacement. His iron saturation was 16%. Rest of his labs are unremarkable. He denied odynophagia or dysphagia abdominal pain diarrhea or any change in his bowel habits. No imaging of the abdomen was done this admission  Endoscopy history:  Remote EGD when he was in the [employer] no colonoscopy      Past Medical History:   Diagnosis Date    CAD (coronary artery disease)     Cataract     BILAT.  Diabetes mellitus ([address]     Goiter, nontoxic, multinodular     Hearing loss, bilateral     [place] (hard of hearing)     BILAT.  HEARING AIDS    Hyperlipidemia     Hypertension     Mediastinal mass 14    MEDIASTINOSCOPY    Osteoarthritis     Peptic ulcer     TIA (transient ischemic attack)     Wears glasses     Wears partial dentures     UPPER      Past Surgical History:   Procedure Laterality Date    BACK SURGERY      LUMBAR LAMI    CARDIAC CATHETERIZATION  [id number]    CARDIAC SURGERY      CARPAL TUNNEL RELEASE Left     COLONOSCOPY      EXTERNAL EAR SURGERY Bilateral     HERNIA REPAIR      UMBILICAL    MASTOID SURGERY      MEDIASTINOSCOPY  14    PACEMAKER PLACEMENT      replaced ;  DR. William Casanova SINUS SURGERY      TONSILLECTOMY      VASECTOMY        Past Endoscopic History as above    Admission Meds  No current facility-administered medications on file prior to encounter. Current Outpatient Medications on File Prior to Encounter   Medication Sig Dispense Refill    betamethasone dipropionate 0.05 % cream Apply topically as needed Apply topically 2 times daily for 1 week and hold for 1 week      donepezil (ARICEPT) 10 MG tablet Take 10 mg by mouth nightly      memantine (NAMENDA) 10 MG tablet Take 10 mg by mouth 2 times daily      aspirin 81 MG tablet Take 81 mg by mouth daily.  carvedilol (COREG) 25 MG tablet Take 25 mg by mouth 2 times daily (with meals).  cloNIDine (CATAPRES) 0.1 MG tablet Take 0.1 mg by mouth 3 times daily.  tamsulosin (FLOMAX) 0.4 MG capsule Take 0.4 mg by mouth daily.  potassium chloride SA (K-DUR;KLOR-CON M) 10 MEQ tablet Take 10 mEq by mouth daily.  metFORMIN (GLUCOPHAGE) 1000 MG tablet Take 1,000 mg by mouth 2 times daily (with meals).  Multiple Vitamins-Minerals (THERAPEUTIC MULTIVITAMIN-MINERALS) tablet Take 1 tablet by mouth daily.  amLODIPine (NORVASC) 10 MG tablet Take 10 mg by mouth daily.  lisinopril-hydrochlorothiazide (PRINZIDE) 20-12.5 MG per tablet Take 1 tablet by mouth 2 times daily.  finasteride (PROSCAR) 5 MG tablet Take 5 mg by mouth daily.  simvastatin (ZOCOR) 40 MG tablet Take 40 mg by mouth nightly.  Cholecalciferol (VITAMIN D) 2000 UNITS CAPS capsule Take 1 capsule by mouth daily.          Patient   Does Use ASA, NSAID Yes  Allergies  Allergies   Allergen Reactions    Latex     Amoxicillin Itching    Diclofenac         Social   Social History     Tobacco Use    Smoking status: Former Smoker     Start date: 1/1/1962    Smokeless tobacco: Never Used   Substance Use Topics    Alcohol use: No        PSYCH HISTORY:  Depression No  Anxiety No  Suicide No       Family History   Problem Relation Age of Onset    Cancer Father     Cancer Sister     Cancer Brother       No family history of colon cancer, Crohn's disease, or ulcerative colitis. Review of Systems  Constitutional: negative  Eyes: negative  Ears, nose, mouth, throat, and face: negative  Respiratory: negative  Cardiovascular: negative  Gastrointestinal: negative  Genitourinary:negative  Integument/breast: negative  Hematologic/lymphatic: negative  Musculoskeletal:negative  Endocrine: negative           Physical Exam  Blood pressure 115/61, pulse 78, temperature 97.9 °F (36.6 °C), temperature source Oral, resp. rate 16, height 5' 8\" (1.727 m), weight 147 lb (66.7 kg), SpO2 96 %.          General Appearance: alert and oriented to person, place and time, well-developed and well-nourished, in no acute distress  Skin: warm and dry, no rash or erythema  Head: normocephalic and atraumatic  Eyes: pupils equal, round, and reactive to light, extraocular eye movements intact, conjunctivae normal  ENT: hearing grossly normal bilaterally  Neck: neck supple and non tender without mass, no thyromegaly or thyroid nodules, no cervical lymphadenopathy   Pulmonary/Chest: clear to auscultation bilaterally- no wheezes, rales or rhonchi, normal air movement, no respiratory distress  Cardiovascular: normal rate, regular rhythm, normal S1 and S2, no murmurs, rubs, clicks or gallops, distal pulses intact, no carotid bruits  Abdomen: soft, non-tender, non-distended, normal bowel sounds, no masses or organomegaly  Extremities: no cyanosis, clubbing or edema  Musculoskeletal: normal range of motion, no joint swelling, deformity or tenderness  Neurologic: no cranial nerve deficit and muscle strength normal    Data Review:    Recent Labs     03/16/22  0506 03/17/22  0520 03/18/22  0534   WBC 8.6 6.4 5.7   HGB 9.2* 9.1* 8.7*   HCT 27.8* 27.7* 26.6*   MCV 84.8 84.7 85.8   * 124* 120*     Recent Labs     03/16/22  0506 03/17/22  0520 03/18/22  0534    142 140   K 3.2* 3.5* 3.4*    106 105   CO2 24 26 26   BUN 15 11 13   CREATININE 0.93 0.91 0.89     No results for input(s): AST, ALT, ALB, BILIDIR, BILITOT, ALKPHOS in the last 72 hours. No results for input(s): LIPASE, AMYLASE in the last 72 hours. No results for input(s): PROTIME, INR in the last 72 hours. No results for input(s): PTT in the last 72 hours. No results for input(s): OCCULTBLD in the last 72 hours. CEA:  No results found for: CEA  Ca 125:  No results found for:   Ca 19-9:  No results found for:   Ca 15-3:  No results found for:   AFP:  No components found for: AFAFP  Beta HCG:  No components found for: BHCG  Neuron Specific Enolase:  No results found for: NSE  Imaging Studies:                           All appropriate imaging studies and reports reviewed: Yes                 Assessment:     Principal Problem:    Orthostatic dizziness  Active Problems:    Iron deficiency anemia    Thrombocytopenia (HCC)  Resolved Problems:    * No resolved hospital problems. *    Anemia  Iron deficiency  No bleeding  No GI symptoms  Syncope with pacemaker replacement  Thrombocytopenia  Aspirin  Recommendations:   Patient will need an EGD colonoscopy we can proceed with those as an outpatient  PPi  H&H monitoring  Iron replacement                      Thank you for allowing me to participate in the care of your patient. Please feel free to contact me with any questions or concerns.      Reva Haney MD

## 2022-03-18 NOTE — PLAN OF CARE
Lorena Rascon is resting well this shift with some c/o discomfort to pacemaker site at HS; Tylenol administered to good effect. He is A-paced with some V-pacing noted. He is maintaining saturations >90% on RA. IV running with no s/s infiltration. Insulin given for elevated glucose. He has ambulated to bathroom x2 this shift; stool sample sent to lab and negative for occult blood. He has call light in reach; safety maintained. Problem: Falls - Risk of:  Goal: Will remain free from falls  Description: Will remain free from falls  3/18/2022 0334 by Jose Daly  Outcome: Ongoing     Problem:  Activity:  Goal: Risk for activity intolerance will decrease  Description: Risk for activity intolerance will decrease  3/18/2022 0334 by Jose Daly  Outcome: Ongoing     Problem: Fluid Volume:  Goal: Ability to maintain a balanced intake and output will improve  Description: Ability to maintain a balanced intake and output will improve  3/18/2022 0334 by Jose Daly  Outcome: Ongoing     Problem: Metabolic:  Goal: Ability to maintain appropriate glucose levels will improve  Description: Ability to maintain appropriate glucose levels will improve  3/18/2022 0334 by Jose Daly  Outcome: Ongoing     Problem: Physical Regulation:  Goal: Complications related to the disease process, condition or treatment will be avoided or minimized  Description: Complications related to the disease process, condition or treatment will be avoided or minimized  3/18/2022 0334 by Jose Daly  Outcome: Ongoing     Problem: Cardiac:  Goal: Ability to maintain an adequate cardiac output will improve  Description: Ability to maintain an adequate cardiac output will improve  3/18/2022 0334 by Jose Daly  Outcome: Ongoing     Problem: Respiratory:  Goal: Ability to maintain adequate ventilation will improve  Description: Ability to maintain adequate ventilation will improve  3/18/2022 0334 by Jose Daly  Outcome: Ongoing

## 2022-03-18 NOTE — PROGRESS NOTES
.Occupational Therapy  Facility/Department: [facility] PROGRESSIVE CARE  Daily Treatment Note  NAME: Shelly Valiente  : 1940  MRN: [id number]    Date of Service: 3/18/2022    Discharge Recommendations:  Patient would benefit from continued therapy after discharge   Pt currently functioning below baseline. Would suggest additional therapy at time of discharge to maximize long term outcomes and prevent re-admission. Please refer to AM-PAC score for current level of function. RN reports patient is medically stable for therapy treatment this date. Chart reviewed prior to treatment and patient is agreeable for therapy. All lines intact and patient positioned comfortably at end of treatment. All patient needs addressed prior to ending therapy session. OT Equipment Recommendations  Equipment Needed: Yes  ADL Assistive Devices: Long-handled Shoe Horn;Long-handled Sponge;Reacher;Sock-Aid Hard;Emergency Alert System    Assessment   Performance deficits / Impairments: Decreased functional mobility ; Decreased ADL status; Decreased safe awareness;Decreased cognition;Decreased strength;Decreased endurance;Decreased fine motor control;Decreased high-level IADLs;Decreased posture;Decreased balance  Assessment: Pt required 2 staff assist for functional mob d/t unsteady gait and increased BP. Skilled OT services are currently recommended to increase I and safety during functional tasks to return [facility] at [facility] as able. Prognosis: Good  OT Education: OT Role;Transfer Training;Plan of Care;Energy Conservation; ADL Adaptive Strategies  Patient Education: safety in function, fall prevention/call light use, recommendations for continued therapy, benefits of being oob, pursed lip breathing  REQUIRES OT FOLLOW UP: Yes  Activity Tolerance  Activity Tolerance: Patient Tolerated treatment well  Safety Devices  Safety Devices in place: Yes  Type of devices: Nurse notified;Gait belt;Call light within reach; Patient at risk for falls; Left in chair;Chair alarm in place         Patient Diagnosis(es): The primary encounter diagnosis was Syncope and collapse. A diagnosis of Orthostatic dizziness was also pertinent to this visit. has a past medical history of CAD (coronary artery disease), Cataract, Diabetes mellitus ([address] Goiter, nontoxic, multinodular, Hearing loss, bilateral, [place] (hard of hearing), Hyperlipidemia, Hypertension, Mediastinal mass, Osteoarthritis, Peptic ulcer, TIA (transient ischemic attack), Wears glasses, and Wears partial dentures. has a past surgical history that includes Cardiac surgery; Cardiac catheterization (2000,2003,2009); pacemaker placement (1999); Carpal tunnel release (Left); External ear surgery (Bilateral, 1959); sinus surgery; Tonsillectomy; Mastoid surgery (1959); hernia repair; Vasectomy; back surgery (2007); Colonoscopy; and Mediastinoscopy (2/4/14). Restrictions  Restrictions/Precautions  Restrictions/Precautions: General Precautions,Fall Risk  Required Braces or Orthoses?: No  Implants present? 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 \"  General Comment  Comments: Pt resting in bed, agreeable to OT eval      Orientation     Objective    ADL  Grooming: Setup;Stand by assistance (pt washed his face while sitting EOB)        Balance  Sitting Balance: Stand by assistance  Standing Balance: Moderate assistance (x2)  Standing Balance  Time: standing ~ 2-3 min  Activity: functional mobility and taking BP  Comment: Patient with vitals as follows: Supine at rest 150\76 Map 98 Pulse 73. Seated /124 Map 139 pulse 75.  Seated EOB following 5 minute rest break 105/90 Map 96 pulse 98 bbp. Standing 150/105 Map 119 pulse 69bbp. Seated in side chair following 5 minute rest break 136/59 71 Map pulse 69bbm. Functional Mobility  Functional - Mobility Device: Rolling Walker  Activity:  (bed to bedside chair)  Assist Level: Moderate assistance (x2)  Functional Mobility Comments: Mod A x2 for safety and balance. Pt with antalgic gait with limp on R LE. Pt very unsteady. Pt required Mod verbal cues/tactile assist for upright posture, pacing self, RW safety, scanning environment and line mgmt all to increase safety. Bed mobility  Supine to Sit: Minimal assistance  Scooting: Minimal assistance  Comment: Pt required increased time and effort for bed mob tasks. Mod VCs for use of bed rails and use of BUE to scoot fully to EOB  Transfers  Sit to stand: Minimal assistance;2 Person assistance  Stand to sit: Minimal assistance;2 Person assistance  Transfer Comments: Mod VCs/tactile cues for RW safety/mgmt, upright posture, squaring self/AD to surface, controlled sit<>stand, all to inc safety/reduce fall risk                       Cognition  Overall Cognitive Status: Exceptions  Arousal/Alertness: Appropriate responses to stimuli;Delayed responses to stimuli  Following Commands: Follows multistep commands with repitition; Follows multistep commands with increased time  Attention Span: Attends with cues to redirect  Memory: Decreased recall of recent events;Decreased short term memory  Safety Judgement: Decreased awareness of need for assistance;Decreased awareness of need for safety  Problem Solving: Decreased awareness of errors;Assistance required to identify errors made;Assistance required to correct errors made  Insights: Decreased awareness of deficits  Initiation: Does not require cues  Sequencing: Requires cues for some                                         Plan   Plan  Times per week: 4-5x/wk 1x/day as chelsi  Current Treatment Recommendations: Strengthening,Endurance Training,Functional Mobility Training,Balance Training,Safety Education & Training,Home Management Training,Self-Care / ADL,Equipment Evaluation, Education, & procurement,Patient/Caregiver Education & Training    AM-PAC Score        AM-PAC Inpatient Daily Activity Raw Score: 15 (03/18/22 1231)  AM-PAC Inpatient ADL T-Scale Score : 34.69 (03/18/22 1231)  ADL Inpatient CMS 0-100% Score: 56.46 (03/18/22 1231)  ADL Inpatient CMS G-Code Modifier : CK (03/18/22 1231)    Goals  Short term goals  Time Frame for Short term goals: By discharge, pt to demo  Short term goal 1: ADL tranfers and functional mobility to CGA with use of AD as needed. Short term goal 2: increased B UE strength by 1/2 grade to assist with functional tasks/I with simple B UE HEP with use of handouts as needed. Short term goal 3: bed mobility to Mod I with use of bedrails as needed. Short term goal 4: UB ADLs to Set up and LB ADLs to SBA with use of AD as needed. Short term goal 5: toileting to CGA with use of AD/grab bars as needed. Long term goals  Long term goal 1: Pt to be I with fall prevention delaney, JO ANN/ tech, recommendations for discharge with use of handouts as needed. Patient Goals   Patient goals : To go home! Therapy Time   Individual Concurrent Group Co-treatment   Time In 2673         Time Out 9366         Minutes 34                 Co-treatment with PT warranted secondary to decreased safety and independence requiring 2 skilled therapy professionals to address individual discipline's goals. OT addressing preparation for ADL transfer, sitting balance for increased ADL performance, sitting/activity tolerance, functional reaching, environmental safety/scanning, fall prevention, functional mobility for ADL transfers, ability to sequence and follow directions and functional UE strength. Upon writer exit, call light within reach, pt retired to chair.  All lines intact and patient positioned comfortably. All patient needs addressed prior to ending therapy session. Chart reviewed prior to treatment and patient is agreeable for therapy. RN reports patient is medically stable for therapy treatment this date.     Annie Kang OTR/L

## 2022-03-18 NOTE — PROGRESS NOTES
Section of Cardiology  Progress Note      Date:  3/18/2022  Patient: Radha Velez  Admission:  3/15/2022  3:40 PM  Admit DX: Syncope and collapse [R55]  Orthostatic dizziness [R42]  Age:  78 y.o., 1940     LOS: 3 days     Reason for evaluation:   arrhythmia      SUBJECTIVE:     The patient was seen and examined. Notes and labs reviewed. There were not complications over night. Patient seen and examined in his room with his nurse at bedside. It appears that he is requiring GI work-up. The pacemaker site appeared to be stable. No bleeding. Slight tenderness. Patient's cardiac review of systems: negative for chest pain and dyspnea. The patient is generally feeling stable. OBJECTIVE:    Telemetry: Atrial paced rhythm with intermittent PVCs  BP (!) 138/58   Pulse 70   Temp 97.9 °F (36.6 °C) (Oral)   Resp 18   Ht 5' 8\" (1.727 m)   Wt 147 lb (66.7 kg)   SpO2 98%   BMI 22.35 kg/m²     Intake/Output Summary (Last 24 hours) at 3/18/2022 5129  Last data filed at 3/17/2022 1847  Gross per 24 hour   Intake 1040 ml   Output --   Net 1040 ml       EXAM:   CONSTITUTIONAL:  awake, alert, cooperative, no apparent distress, and appears stated age. HEENT: Normal jugular venous pulsations, no carotid bruits. Head is atraumatic, normocephalic. Eyes and oral mucosa are normal.  LUNGS: Good respiratory effort. No for increased work of breathing. On auscultation: clear to auscultation bilaterally  CARDIOVASCULAR:  Normal apical impulse, irregular rate and rhythm, normal S1 and S2, no S3 or S4,   ABDOMEN: Soft, nontender, nondistended. Bowel sounds present. SKIN: Warm and dry. EXTREMITIES: No lower extremity edema. Motor movement grossly intact. No cyanosis or clubbing.     Current Inpatient Medications:   aspirin  81 mg Oral Daily    carvedilol  25 mg Oral BID     Vitamin D  2,000 Units Oral Daily    donepezil  10 mg Oral Nightly    memantine  10 mg Oral BID    metFORMIN  1,000 mg Oral BID WC    tamsulosin  0.4 mg Oral Daily    ferrous sulfate  325 mg Oral Daily with breakfast    cloNIDine  0.1 mg Oral TID    finasteride  5 mg Oral Daily    atorvastatin  20 mg Oral Daily    sodium chloride flush  5-40 mL IntraVENous 2 times per day    enoxaparin  40 mg SubCUTAneous Daily    insulin lispro  0-6 Units SubCUTAneous TID WC    insulin lispro  0-3 Units SubCUTAneous Nightly       IV Infusions (if any):   dextrose      sodium chloride      sodium chloride Stopped (03/16/22 1612)       Diagnostics:       Labs:   CBC:   Recent Labs     03/17/22  0520 03/18/22  0534   WBC 6.4 5.7   HGB 9.1* 8.7*   HCT 27.7* 26.6*   * 120*     BMP:   Recent Labs     03/17/22  0520 03/18/22  0534    140   K 3.5* 3.4*   CO2 26 26   BUN 11 13   CREATININE 0.91 0.89   LABGLOM >60 >60   GLUCOSE 134* 128*     No results found for: BNP  PT/INR: No results for input(s): PROTIME, INR in the last 72 hours. APTT:No results for input(s): APTT in the last 72 hours. CARDIAC ENZYMES:No results for input(s): CKTOTAL, CKMB, CKMBINDEX, TROPONINT in the last 72 hours. FASTING LIPID PANEL:No results found for: HDL, LDLDIRECT, LDLCALC, TRIG  LIVER PROFILE:No results for input(s): AST, ALT, LABALBU in the last 72 hours. ASSESSMENT:  1. syncopal attack, could be vasovagal reaction  2.  End-of-life pacemaker battery  3.  History of hypertension  4.  History of type 2 diabetes mellitus  Patient Active Problem List   Diagnosis    Mass of mediastinum    Bladder cancer ([address]    Orthostatic dizziness       PLAN:    1. Ambulate as tolerated  2. Cardiac status stable I will sign off. 3. He will follow with his cardiologist.      Please see orders. Discussed with patient and nursing.     Loann Hamman, MD, MD

## 2022-03-18 NOTE — FLOWSHEET NOTE
Call received from dgtr Sam Mckeon, updated on patient and informed of possible discharge, voiced frustrations re: pts care once home and inability to transport patient, call directed to discharge planning

## 2022-03-18 NOTE — DISCHARGE INSTR - COC
Continuity of Care Form    Patient Name: Marnie Harvey   :  1940  MRN:  [id number]    Admit date:  3/15/2022  Discharge date:  2022    Code Status Order: Full Code   Advance Directives:      Admitting Physician:  Veronica Leiva MD  PCP: Justice Hassan MD    Discharging Nurse: [facility] Unit/Room#: 1010/1010-02  Discharging Unit Phone Number: [phone]    Emergency Contact:   Extended Emergency Contact Information  Primary Emergency Contact: Ondina Dominguez  Address: [address] Phone: [phone]  Relation: Spouse  Secondary Emergency Contact: shaun soto  Mobile Phone: [phone]  Relation: Child    Past Surgical History:  Past Surgical History:   Procedure Laterality Date    BACK SURGERY      LUMBAR LAMI    CARDIAC CATHETERIZATION  [id number]    CARDIAC SURGERY      CARPAL TUNNEL RELEASE Left     COLONOSCOPY      EXTERNAL EAR SURGERY Bilateral [address]    MEDIASTINOSCOPY  14    PACEMAKER PLACEMENT      replaced ;  DR. Annie Ortiz    SINUS SURGERY      TONSILLECTOMY      VASECTOMY         Immunization History: There is no immunization history on file for this patient.     Active Problems:  Patient Active Problem List   Diagnosis Code    Mass of mediastinum J98.59    Bladder cancer (HCC) C67.9    Orthostatic dizziness R42    Iron deficiency anemia D50.9    Thrombocytopenia (HCC) D69.6       Isolation/Infection:   Isolation            No Isolation          Patient Infection Status       None to display            Nurse Assessment:  Last Vital Signs: /61   Pulse 78   Temp 97.9 °F (36.6 °C) (Oral)   Resp 16   Ht 5' 8\" (1.727 m)   Wt 147 lb (66.7 kg)   SpO2 96%   BMI 22.35 kg/m²     Last documented pain score (0-10 scale): Pain Level: 0  Last Weight:   Wt Readings from Last 1 Encounters:   03/15/22 147 lb (66.7 kg)     Mental Status:  oriented, alert, and forgetful at times. H/o dementia    IV Access:  - None    Nursing Mobility/ADLs:  Walking   Assisted  Transfer  Assisted  Bathing  Assisted  Dressing  Assisted  Toileting  Assisted  Feeding  Independent  Med Admin  Assisted  Med Delivery   whole    Wound Care Documentation and Therapy:  Incision 02/04/14 Chest Proximal;Medial (Active)   Number of days: 2964        Elimination:  Continence: Bowel: No  Bladder: No  Urinary Catheter: None   Colostomy/Ileostomy/Ileal Conduit: No       Date of Last BM: 03/18/2022    Intake/Output Summary (Last 24 hours) at 3/18/2022 1428  Last data filed at 3/17/2022 1847  Gross per 24 hour   Intake 840 ml   Output --   Net 840 ml     I/O last 3 completed shifts: In: 1782.1 [P.O.:640; I.V.:1142.1]  Out: 750 [Urine:750]    Safety Concerns:     History of Falls (last 30 days) and At Risk for Falls    Impairments/Disabilities:      Vision and Hearing    Nutrition Therapy:  Current Nutrition Therapy:   - Oral Diet:  General and Cardiac    Routes of Feeding: Oral  Liquids: No Restrictions  Daily Fluid Restriction: no  Last Modified Barium Swallow with Video (Video Swallowing Test): not done    Treatments at the Time of Hospital Discharge:   Respiratory Treatments: n/a  Oxygen Therapy:  is not on home oxygen therapy.   Ventilator:    - No ventilator support    Rehab Therapies: Physical Therapy and Occupational Therapy  Weight Bearing Status/Restrictions: No weight bearing restrictions  Other Medical Equipment (for information only, NOT a DME order):  walker and delivered to facility   Other Treatments:   Skilled nurse assessment  Medication teaching and compliance    Patient's personal belongings (please select all that are sent with patient):  Glasses, Hearing Aides bilateral, Dentures upper and lower    RN SIGNATURE:  Electronically signed by Donna Gilliland RN on 3/18/22 at 2:37 PM EDT    CASE MANAGEMENT/SOCIAL WORK SECTION    Inpatient Status Date: ***    Readmission Risk Assessment Score:  Readmission Risk              Risk of Unplanned Readmission:  18         Discharging to Facility/ Agency   Name: [facility]  Address:  Phone: [phone]  Fax: [phone]    Dialysis Facility (if applicable)   Name:  Address:  Dialysis Schedule:  Phone:  Fax:    / signature: Electronically signed by Kody Lawrence on 3/22/22 at 2:23 PM EDT    PHYSICIAN SECTION    Prognosis: Fair    Condition at Discharge: Stable    Rehab Potential (if transferring to Rehab): Good    Recommended Labs or Other Treatments After Discharge: bmp cbc  up with walker checlk bs daily     Physician Certification: I certify the above information and transfer of Jesse Menon  is necessary for the continuing treatment of the diagnosis listed and that he requires [address] for less 30 days.      Update Admission H&P: No change in H&P    PHYSICIAN SIGNATURE:  Electronically signed by Jeff Calderon MD on 3/22/22 at 4:31 PM EDT

## 2022-03-18 NOTE — PLAN OF CARE
PRE CONSULT ROUNDING NOTE  HPI  78year old male with pmh of cad dm goiter tia peptic ulcer who presented to the ed for syncope. He underwent pacemaker generator replacement and pocket revision on 3/16/22.our service is being consulted for iron deficiency anemia. The pt states he has a history of anemia but does not know if he has ever been on iron replacement. Was noted to have hgb 8.7 iron 29 with 16% saturation. plt 120. Other labs unremarkable, no abdominal imaging has been done. He takes aspirin. He has not had fevers chills dysphagia weight loss abdominal pain change in the bowel habits melena hematochezia hematemesis dyspepsia or rash.      Endoscopy egd for ulcers when pt was in the [employer], unsure about colonoscopy -no reports  Family reports no hx of liver pancreatic stomach or colon cancer no ux/crohns  Social quit smoking no etoh or illicit drugs   BP (!) 142/76   Pulse 67   Temp 98.6 °F (37 °C) (Oral)   Resp 16   Ht 5' 8\" (1.727 m)   Wt 147 lb (66.7 kg)   SpO2 93%   BMI 22.35 kg/m²     ROS as above meds labs imaging and past medical records were reviewed    Exam  General Appearance: alert and oriented to person, place and time, well-developed and well-nourished, in no acute distress  Skin: warm and dry, no rash or erythema pt has tattoos  Head: normocephalic and atraumatic  Eyes: pupils equal, round, and reactive to light, extraocular eye movements intact, conjunctivae normal  ENT: hearing grossly normal bilaterally  Neck: neck supple and non tender without mass, no thyromegaly or thyroid nodules, no cervical lymphadenopathy   Pulmonary/Chest: clear to auscultation bilaterally- no wheezes, rales or rhonchi, normal air movement, no respiratory distress  Cardiovascular: normal rate, regular rhythm, normal S1 and S2, no murmurs, rubs, clicks or gallops, distal pulses intact, no carotid bruits  Abdomen: soft,  non tender, non-distended, normal bowel sounds, no masses or organomegaly no ascites  Extremities: no cyanosis, clubbing or edema  Musculoskeletal: normal range of motion, no joint swelling, deformity or tenderness  Neurologic: no cranial nerve deficit and muscle strength normal    Assessment  Iron deficiency anemia  Thrombocytopenia  Syncope s/p pacemaker replacement 3/16    Plan  Will d/w md, pt will be discharged per rn today, will need endoscopy at outpt  Iron replacement  Formal gi consult to follow  . Torin Davis, APRN - CNP

## 2022-03-18 NOTE — PROGRESS NOTES
Subjective:  Syncope   no Further syncope no dizziness no chest pain no palpitation ROS  No fever no chills no GI/ complaints no cardiopulmonary complaints no TIA no bleeding no headache no sore throat no skin lesion no polyuria no polydipsia no physical exam  General Appearance: in no acute distress and alert  Skin: warm and dry, no rash or erythema  Head: normocephalic and atraumatic  Eyes: pupils equal, round, and reactive to light, sclera anicteric and mild pallor  Neck: neck supple and non tender without mass   Pulmonary/Chest: clear to auscultation bilaterally- no wheezes, rales or rhonchi, normal air movement, no respiratory distress  Cardiovascular: normal rate, regular rhythm, good pulses no Homans' sign ormal S1 and S2, no gallops, intact distal pulses and no carotid bruits  Abdomen: soft, non-tender, non-distended, normal bowel sounds, no masses or organomegaly  Extremities: no edema and good pulses no Homans' sign  Neurologic: Alert oriented x3 with no focal deficit    /68   Pulse 75   Temp 97.9 °F (36.6 °C) (Oral)   Resp 16   Ht 5' 8\" (1.727 m)   Wt 147 lb (66.7 kg)   SpO2 95%   BMI 22.35 kg/m²     CBC:   Lab Results   Component Value Date    WBC 5.7 03/18/2022    RBC 3.10 03/18/2022    HGB 8.7 03/18/2022    HCT 26.6 03/18/2022    MCV 85.8 03/18/2022    MCH 28.1 03/18/2022    MCHC 32.7 03/18/2022    RDW 12.7 03/18/2022     03/18/2022    MPV 11.7 03/18/2022     BMP:    Lab Results   Component Value Date     03/18/2022    K 3.4 03/18/2022     03/18/2022    CO2 26 03/18/2022    BUN 13 03/18/2022    LABALBU 4.2 01/31/2014    CREATININE 0.89 03/18/2022    CALCIUM 9.0 03/18/2022    GFRAA >60 03/18/2022    LABGLOM >60 03/18/2022    GLUCOSE 128 03/18/2022        Assessment:  Patient Active Problem List   Diagnosis    Mass of mediastinum    Bladder cancer (HCC)    Orthostatic dizziness    Iron deficiency anemia    Thrombocytopenia (HCC)    Syncope and collapse     Syncope vasovagal   Dehydration  End-of-life battery for pacemaker  Essential hypertension  Iron deficiency anemia  Type 2 diabetes with CKD 2  CKD 2  Coronary artery disease  plan:    Labs reviewed patient is agreeable for skilled nursing facility will ask social service to see recheck labs in the morning continue with physical therapy occupational therapy DVT prophylaxis avoid nephrotoxic drugs  Ac hs accuchecks  incsulin coverage    Sharad Creon MD MD  5:42 PM

## 2022-03-19 LAB
ABSOLUTE EOS #: 0.18 K/UL (ref 0–0.44)
ABSOLUTE IMMATURE GRANULOCYTE: 0.02 K/UL (ref 0–0.3)
ABSOLUTE LYMPH #: 1.14 K/UL (ref 1.1–3.7)
ABSOLUTE MONO #: 0.69 K/UL (ref 0.1–1.2)
ANION GAP SERPL CALCULATED.3IONS-SCNC: 10 MMOL/L (ref 9–17)
BASOPHILS # BLD: 1 % (ref 0–2)
BASOPHILS ABSOLUTE: 0.04 K/UL (ref 0–0.2)
BUN BLDV-MCNC: 14 MG/DL (ref 8–23)
BUN/CREAT BLD: 16 (ref 9–20)
CALCIUM SERPL-MCNC: 8.9 MG/DL (ref 8.6–10.4)
CHLORIDE BLD-SCNC: 106 MMOL/L (ref 98–107)
CO2: 23 MMOL/L (ref 20–31)
CREAT SERPL-MCNC: 0.87 MG/DL (ref 0.7–1.2)
EOSINOPHILS RELATIVE PERCENT: 3 % (ref 1–4)
GFR AFRICAN AMERICAN: >60 ML/MIN
GFR NON-AFRICAN AMERICAN: >60 ML/MIN
GFR SERPL CREATININE-BSD FRML MDRD: ABNORMAL ML/MIN/{1.73_M2}
GLUCOSE BLD-MCNC: 101 MG/DL (ref 75–110)
GLUCOSE BLD-MCNC: 113 MG/DL (ref 70–99)
GLUCOSE BLD-MCNC: 153 MG/DL (ref 75–110)
GLUCOSE BLD-MCNC: 160 MG/DL (ref 75–110)
GLUCOSE BLD-MCNC: 87 MG/DL (ref 75–110)
HCT VFR BLD CALC: 25.7 % (ref 40.7–50.3)
HEMOGLOBIN: 8.4 G/DL (ref 13–17)
IMMATURE GRANULOCYTES: 0 %
LYMPHOCYTES # BLD: 19 % (ref 24–43)
MCH RBC QN AUTO: 27.6 PG (ref 25.2–33.5)
MCHC RBC AUTO-ENTMCNC: 32.7 G/DL (ref 28.4–34.8)
MCV RBC AUTO: 84.5 FL (ref 82.6–102.9)
MONOCYTES # BLD: 11 % (ref 3–12)
NRBC AUTOMATED: 0 PER 100 WBC
PDW BLD-RTO: 12.7 % (ref 11.8–14.4)
PLATELET # BLD: 117 K/UL (ref 138–453)
PMV BLD AUTO: 11.7 FL (ref 8.1–13.5)
POTASSIUM SERPL-SCNC: 3.8 MMOL/L (ref 3.7–5.3)
RBC # BLD: 3.04 M/UL (ref 4.21–5.77)
SEG NEUTROPHILS: 66 % (ref 36–65)
SEGMENTED NEUTROPHILS ABSOLUTE COUNT: 3.96 K/UL (ref 1.5–8.1)
SODIUM BLD-SCNC: 139 MMOL/L (ref 135–144)
WBC # BLD: 6 K/UL (ref 3.5–11.3)

## 2022-03-19 PROCEDURE — 6370000000 HC RX 637 (ALT 250 FOR IP): Performed by: INTERNAL MEDICINE

## 2022-03-19 PROCEDURE — 97110 THERAPEUTIC EXERCISES: CPT

## 2022-03-19 PROCEDURE — 97116 GAIT TRAINING THERAPY: CPT

## 2022-03-19 PROCEDURE — 97530 THERAPEUTIC ACTIVITIES: CPT

## 2022-03-19 PROCEDURE — APPSS30 APP SPLIT SHARED TIME 16-30 MINUTES: Performed by: NURSE PRACTITIONER

## 2022-03-19 PROCEDURE — 85025 COMPLETE CBC W/AUTO DIFF WBC: CPT

## 2022-03-19 PROCEDURE — 2060000000 HC ICU INTERMEDIATE R&B

## 2022-03-19 PROCEDURE — 82947 ASSAY GLUCOSE BLOOD QUANT: CPT

## 2022-03-19 PROCEDURE — 6360000002 HC RX W HCPCS: Performed by: INTERNAL MEDICINE

## 2022-03-19 PROCEDURE — 99232 SBSQ HOSP IP/OBS MODERATE 35: CPT | Performed by: INTERNAL MEDICINE

## 2022-03-19 PROCEDURE — 36415 COLL VENOUS BLD VENIPUNCTURE: CPT

## 2022-03-19 PROCEDURE — 80048 BASIC METABOLIC PNL TOTAL CA: CPT

## 2022-03-19 PROCEDURE — 2580000003 HC RX 258: Performed by: INTERNAL MEDICINE

## 2022-03-19 RX ORDER — LOPERAMIDE HYDROCHLORIDE 2 MG/1
2 CAPSULE ORAL 2 TIMES DAILY PRN
Status: DISCONTINUED | OUTPATIENT
Start: 2022-03-19 | End: 2022-03-22 | Stop reason: HOSPADM

## 2022-03-19 RX ADMIN — LOPERAMIDE HYDROCHLORIDE 2 MG: 2 CAPSULE ORAL at 10:51

## 2022-03-19 RX ADMIN — CARVEDILOL 25 MG: 25 TABLET, FILM COATED ORAL at 16:55

## 2022-03-19 RX ADMIN — TAMSULOSIN HYDROCHLORIDE 0.4 MG: 0.4 CAPSULE ORAL at 08:20

## 2022-03-19 RX ADMIN — CLONIDINE HYDROCHLORIDE 0.1 MG: 0.1 TABLET ORAL at 20:06

## 2022-03-19 RX ADMIN — CLONIDINE HYDROCHLORIDE 0.1 MG: 0.1 TABLET ORAL at 13:14

## 2022-03-19 RX ADMIN — MEMANTINE 10 MG: 10 TABLET ORAL at 20:06

## 2022-03-19 RX ADMIN — CARVEDILOL 25 MG: 25 TABLET, FILM COATED ORAL at 08:20

## 2022-03-19 RX ADMIN — SODIUM CHLORIDE, PRESERVATIVE FREE 10 ML: 5 INJECTION INTRAVENOUS at 08:21

## 2022-03-19 RX ADMIN — DONEPEZIL HYDROCHLORIDE 10 MG: 10 TABLET, FILM COATED ORAL at 20:05

## 2022-03-19 RX ADMIN — SODIUM CHLORIDE, PRESERVATIVE FREE 10 ML: 5 INJECTION INTRAVENOUS at 20:06

## 2022-03-19 RX ADMIN — INSULIN LISPRO 1 UNITS: 100 INJECTION, SOLUTION INTRAVENOUS; SUBCUTANEOUS at 13:15

## 2022-03-19 RX ADMIN — METFORMIN HYDROCHLORIDE 1000 MG: 500 TABLET ORAL at 08:20

## 2022-03-19 RX ADMIN — FERROUS SULFATE TAB EC 325 MG (65 MG FE EQUIVALENT) 325 MG: 325 (65 FE) TABLET DELAYED RESPONSE at 08:20

## 2022-03-19 RX ADMIN — ENOXAPARIN SODIUM 40 MG: 100 INJECTION SUBCUTANEOUS at 08:21

## 2022-03-19 RX ADMIN — MEMANTINE 10 MG: 10 TABLET ORAL at 08:20

## 2022-03-19 RX ADMIN — ASPIRIN 81 MG: 81 TABLET, COATED ORAL at 08:20

## 2022-03-19 RX ADMIN — FINASTERIDE 5 MG: 5 TABLET, FILM COATED ORAL at 08:20

## 2022-03-19 RX ADMIN — CLONIDINE HYDROCHLORIDE 0.1 MG: 0.1 TABLET ORAL at 08:20

## 2022-03-19 RX ADMIN — INSULIN LISPRO 1 UNITS: 100 INJECTION, SOLUTION INTRAVENOUS; SUBCUTANEOUS at 20:05

## 2022-03-19 RX ADMIN — Medication 2000 UNITS: at 08:20

## 2022-03-19 RX ADMIN — ATORVASTATIN CALCIUM 20 MG: 20 TABLET, FILM COATED ORAL at 08:20

## 2022-03-19 ASSESSMENT — PAIN SCALES - GENERAL
PAINLEVEL_OUTOF10: 0
PAINLEVEL_OUTOF10: 0

## 2022-03-19 NOTE — PROGRESS NOTES
Physical Therapy  Facility/Department: [facility] PROGRESSIVE CARE  Daily Treatment Note  NAME: Wil Kim  : 1940  MRN: [id number]    Date of Service: 3/19/2022    Discharge Recommendations:  Patient would benefit from continued therapy after discharge        Assessment   Body structures, Functions, Activity limitations: Decreased functional mobility ; Decreased strength;Decreased safe awareness;Decreased balance;Decreased endurance; Increased pain  Assessment: Pt demos markedly improved functional mobility tolerance with dec assist. Mohan or Supervision, new RW delivered for discharge home safely. Patient Education: Patient educated on safety awareness and proper positioning and use for RW. Activity Tolerance  Activity Tolerance: Patient limited by fatigue  Activity Tolerance: Markedly improved gait, transfer and ther ex tolerance. Patient Diagnosis(es): The primary encounter diagnosis was Syncope and collapse. A diagnosis of Orthostatic dizziness was also pertinent to this visit. has a past medical history of CAD (coronary artery disease), Cataract, Diabetes mellitus ([facility] [address] Goiter, nontoxic, multinodular, Hearing loss, bilateral, [place] (hard of hearing), Hyperlipidemia, Hypertension, Mediastinal mass, Osteoarthritis, Peptic ulcer, TIA (transient ischemic attack), Wears glasses, and Wears partial dentures. has a past surgical history that includes Cardiac surgery; Cardiac catheterization (,,); pacemaker placement (); Carpal tunnel release (Left); External ear surgery (Bilateral, ); sinus surgery; Tonsillectomy; Mastoid surgery (); hernia repair; Vasectomy; back surgery (); Colonoscopy; and Mediastinoscopy (14).     Restrictions  Restrictions/Precautions  Restrictions/Precautions: General Precautions,Fall Risk  Required Braces or Orthoses?: No  Implants present? : Pacemaker  Position Activity Restriction  Other position/activity restrictions: Up w/ assist, LUE IV access, ALARMS, ortho statics  Subjective   General  Chart Reviewed: Yes  Response To Previous Treatment: Patient with no complaints from previous session. Family / Caregiver Present: No  Subjective  Subjective: Patient agreeable to PT treatment  General Comment  Comments: RN & pt agreeable to PT. Pt toileting on arrival.  Pain Screening  Patient Currently in Pain: Denies  Vital Signs  Patient Currently in Pain: Denies       Orientation  Orientation  Overall Orientation Status: Within Functional Limits     Cognition   Cognition  Overall Cognitive Status: Exceptions  Arousal/Alertness: Appropriate responses to stimuli  Following Commands: Follows multistep commands with increased time  Attention Span: Appears intact  Memory: Decreased recall of recent events;Decreased short term memory  Problem Solving: Assistance required to identify errors made;Assistance required to generate solutions;Assistance required to correct errors made  Insights: Decreased awareness of deficits  Initiation: Does not require cues  Sequencing: Requires cues for some     Objective   Bed mobility  Comment: Pt in bathroom on arrival, left in chair on departure with chair alarm, call light in reach, all needs met. Transfers  Stand to sit: Modified independent  Bed to Chair: Modified independent  Stand Pivot Transfers: Modified independent  Comment: Transfers with and without RW, vc's to push up/reach back with UE's to dec fall risk, however pt is not unsafe without use of UE's. Ambulation  Ambulation?: Yes  Ambulation 1  Surface: level tile  Device: Rolling Walker; No Device  Assistance: Supervision  Gait Deviations: Decreased step length  Distance: 100 ft x 2 with RW, 20 ft x 1 without AD  Comments: Pt's newly delivered RW assessed for proper height and glider tips applied for safe RW function, rubber tips removed, were left with other personal items present in pt's room. Pt ed on purpose of different tips.      Balance  Posture: Fair  Sitting - Static: Good  Sitting - Dynamic: Good  Standing - Static: Good  Standing - Dynamic: Fair;+  Comments: Pt demos improved standing bal and safety with use of RW vs. no AD. Exercises  Comments: Pt able to perform BLE standing strengthening program with UE support on RW, also chair push ups x 10, rest breaks PRN. OutComes Score     AM-PAC Score  AM-PAC Inpatient Mobility Raw Score : 21 (03/19/22 1102)  AM-PAC Inpatient T-Scale Score : 50.25 (03/19/22 1102)  Mobility Inpatient CMS 0-100% Score: 28.97 (03/19/22 1102)  Mobility Inpatient CMS G-Code Modifier : CJ (03/19/22 1102)        Goals  Short term goals  Time Frame for Short term goals: 12 visits  Short term goal 1: Pt to demonstrate bed mobility Elmo-MET  Short term goal 2: Pt to perform STS transfers w/ least restrictive AD Elmo-MET  Short term goal 3: Pt to ambulate at least 100ft w/ least restrictive AD ModA-MET  Short term goal 4: Pt to actively participate in at least 30 minutes of physical therapy for ther act, ther ex, balance, gait, and endurance training  Patient Goals   Patient goals :  To go home    Plan    Plan  Times per week: 1-2x/day, 5-6x/week  Current Treatment Recommendations: Strengthening,Balance Training,Functional Mobility Training,Stair training,Gait Training,Transfer Training,Endurance Training,Safety Education & Training,Home Exercise Program,Equipment Evaluation, Education, & procurement,Patient/Caregiver Education & Training,Pain Management  Safety Devices  Type of devices: Left in chair,Chair alarm in place,Nurse notified,All fall risk precautions in place,Gait belt  Restraints  Initially in place: No     Therapy Time   Individual Concurrent Group Co-treatment   Time In 1016         Time Out [address] [place] [address]

## 2022-03-19 NOTE — PLAN OF CARE
Problem: Falls - Risk of:  Goal: Will remain free from falls  Description: Will remain free from falls  Outcome: Ongoing     Problem: Falls - Risk of:  Goal: Absence of physical injury  Description: Absence of physical injury  Outcome: Ongoing     Problem:  Activity:  Goal: Risk for activity intolerance will decrease  Description: Risk for activity intolerance will decrease  Outcome: Ongoing     Problem: Coping:  Goal: Ability to adjust to condition or change in health will improve  Description: Ability to adjust to condition or change in health will improve  Outcome: Ongoing     Problem: Coping:  Goal: Verbalizations of decreased anxiety will decrease  Description: Verbalizations of decreased anxiety will decrease  Outcome: Ongoing     Problem: Fluid Volume:  Goal: Ability to maintain a balanced intake and output will improve  Description: Ability to maintain a balanced intake and output will improve  Outcome: Ongoing     Problem: Fluid Volume:  Goal: Risk for excess fluid volume will decrease  Description: Risk for excess fluid volume will decrease  Outcome: Ongoing     Problem: Fluid Volume:  Goal: Maintenance of adequate hydration will improve  Description: Maintenance of adequate hydration will improve  Outcome: Ongoing     Problem: Fluid Volume:  Goal: Will show no signs and symptoms of electrolyte imbalance  Description: Will show no signs and symptoms of electrolyte imbalance  Outcome: Ongoing     Problem: Health Behavior:  Goal: Ability to identify and utilize available resources and services will improve  Description: Ability to identify and utilize available resources and services will improve  Outcome: Ongoing     Problem: Health Behavior:  Goal: Ability to manage health-related needs will improve  Description: Ability to manage health-related needs will improve  Outcome: Ongoing     Problem: Health Behavior:  Goal: Ability to seek appropriate health care will improve  Description: Ability to seek appropriate health care will improve  Outcome: Ongoing     Problem: Metabolic:  Goal: Ability to maintain appropriate glucose levels will improve  Description: Ability to maintain appropriate glucose levels will improve  Outcome: Ongoing     Problem: Nutritional:  Goal: Maintenance of adequate nutrition will improve  Description: Maintenance of adequate nutrition will improve  Outcome: Ongoing     Problem: Physical Regulation:  Goal: Complications related to the disease process, condition or treatment will be avoided or minimized  Description: Complications related to the disease process, condition or treatment will be avoided or minimized  Outcome: Ongoing     Problem: Physical Regulation:  Goal: Diagnostic test results will improve  Description: Diagnostic test results will improve  Outcome: Ongoing     Problem: Skin Integrity:  Goal: Risk for impaired skin integrity will decrease  Description: Risk for impaired skin integrity will decrease  Outcome: Ongoing     Problem: Tissue Perfusion:  Goal: Adequacy of tissue perfusion will improve  Description: Adequacy of tissue perfusion will improve  Outcome: Ongoing     Problem: Cardiac:  Goal: Hemodynamic stability will improve  Description: Hemodynamic stability will improve  Outcome: Ongoing     Problem: Cardiac:  Goal: Ability to maintain an adequate cardiac output will improve  Description: Ability to maintain an adequate cardiac output will improve  Outcome: Ongoing     Problem: Respiratory:  Goal: Ability to maintain adequate ventilation will improve  Description: Ability to maintain adequate ventilation will improve  Outcome: Ongoing     Problem: Respiratory:  Goal: Respiratory status will improve  Description: Respiratory status will improve  Outcome: Ongoing

## 2022-03-19 NOTE — PROGRESS NOTES
Spoke with Pt's daughter Jenna Flores about her fathers discharge plans. She explained that at some point there was some miscommunication and they do not want him going to an ECF, that they want home health care. Writer explained that this information would be communicated with day shift.

## 2022-03-19 NOTE — PROGRESS NOTES
[facility]    Gastroenterology Daily Progress Note      Patient:   Job Pitch   :    1940   Facility:   [facility]  Date:     3/19/2022  Consultant:   Emi Soulier, APRN - CNP, CNP      SUBJECTIVE  78 y.o. male admitted 3/15/2022 with Syncope and collapse [R55]  Orthostatic dizziness [R42] and seen for iron deficiency anemia. The pt was seen and examined. hgb 8.4 no c/o abdominal pain or nausea.  .        OBJECTIVE  Scheduled Meds:   aspirin  81 mg Oral Daily    carvedilol  25 mg Oral BID WC    Vitamin D  2,000 Units Oral Daily    donepezil  10 mg Oral Nightly    memantine  10 mg Oral BID    metFORMIN  1,000 mg Oral BID WC    tamsulosin  0.4 mg Oral Daily    ferrous sulfate  325 mg Oral Daily with breakfast    cloNIDine  0.1 mg Oral TID    finasteride  5 mg Oral Daily    atorvastatin  20 mg Oral Daily    sodium chloride flush  5-40 mL IntraVENous 2 times per day    enoxaparin  40 mg SubCUTAneous Daily    insulin lispro  0-6 Units SubCUTAneous TID WC    insulin lispro  0-3 Units SubCUTAneous Nightly       Vital Signs:  /60   Pulse 69   Temp 99 °F (37.2 °C) (Oral)   Resp 16   Ht 5' 8\" (1.727 m)   Wt 147 lb (66.7 kg)   SpO2 94%   BMI 22.35 kg/m²      Physical Exam:     General Appearance: alert and oriented to person, place and time, well-developed and well-nourished, in no acute distress  Skin: warm and dry, no rash or erythema  Head: normocephalic and atraumatic  Eyes: pupils equal, round, and reactive to light, extraocular eye movements intact, conjunctivae normal  ENT: hearing grossly normal bilaterally  Neck: neck supple and non tender without mass, no thyromegaly or thyroid nodules, no cervical lymphadenopathy   Pulmonary/Chest: clear to auscultation bilaterally- no wheezes, rales or rhonchi, normal air movement, no respiratory distress  Cardiovascular: normal rate, regular rhythm, normal S1 and S2, no murmurs, rubs, clicks or gallops, distal pulses intact, no carotid bruits  Abdomen: soft, non-tender, non-distended, normal bowel sounds, no masses or organomegaly  Extremities: no cyanosis, clubbing or edema  Musculoskeletal: normal range of motion, no joint swelling, deformity or tenderness  Neurologic: no cranial nerve deficit and muscle strength normal    Lab and Imaging Review     CBC  Recent Labs     03/17/22 0520 03/18/22  0534 03/19/22  0523   WBC 6.4 5.7 6.0   HGB 9.1* 8.7* 8.4*   HCT 27.7* 26.6* 25.7*   MCV 84.7 85.8 84.5   * 120* 117*       BMP  Recent Labs     03/17/22  0520 03/18/22  0534 03/19/22  0523    140 139   K 3.5* 3.4* 3.8    105 106   CO2 26 26 23   BUN 11 13 14   CREATININE 0.91 0.89 0.87   GLUCOSE 134* 128* 113*   CALCIUM 9.1 9.0 8.9       ANEMIA STUDIES  Recent Labs     03/16/22  1113   LABIRON 16*   TIBC 184*   FERRITIN 196   [id number] 350   FOLATE >20.0         ASSESSMENT/plan  1. Iron deficiency anemia  -ppi  -iron replacement  outpt endoscopy   Diet as tolerated from a gi standpoint    Gi signing off f/u outpt    This plan was formulated in collaboration with Dr. Sahil Tan .     Electronically signed by: KENN Acosta CNP on 3/19/2022 at 11:07 AM

## 2022-03-19 NOTE — FLOWSHEET NOTE
Patient having frequent loose stools, Dr Gael Celis notified via [address] [place] [address], return call received and order received for prn Immodium

## 2022-03-19 NOTE — PROGRESS NOTES
Subjective:    Syncope  No further syncope no dizziness no seizure  ROS  No fever no chills no GI/ complaints except some diarrhea, no cardiopulmonary complaints no TIA no bleeding no headache no sore throat no skin lesions no polyuria no polydipsia no hypo with  physical exam  General Appearance: in no acute distress and alert  Skin: warm and dry, no rash or erythema  Head: normocephalic and atraumatic  Eyes: Pupils equal round reactive pallor but no jaundice    Neck: neck supple and non tender without mass   Pulmonary/Chest: clear to auscultation bilaterally- no wheezes, rales or rhonchi, normal air movement, no respiratory distress  Cardiovascular: normal rate, regular rhythm, normal S1 and S2, no gallops, intact distal pulses and no carotid bruits  Abdomen: soft, non-tender, non-distended, normal bowel sounds, no masses or organomegaly  Extremities: no edema good pulses no Homans  Neurologic: Alert oriented x3 with no focal deficit    BP (!) 112/59   Pulse 69   Temp 98.1 °F (36.7 °C) (Oral)   Resp 16   Ht 5' 8\" (1.727 m)   Wt 147 lb (66.7 kg)   SpO2 97%   BMI 22.35 kg/m²     CBC:   Lab Results   Component Value Date    WBC 6.0 03/19/2022    RBC 3.04 03/19/2022    HGB 8.4 03/19/2022    HCT 25.7 03/19/2022    MCV 84.5 03/19/2022    MCH 27.6 03/19/2022    MCHC 32.7 03/19/2022    RDW 12.7 03/19/2022     03/19/2022    MPV 11.7 03/19/2022     BMP:    Lab Results   Component Value Date     03/19/2022    K 3.8 03/19/2022     03/19/2022    CO2 23 03/19/2022    BUN 14 03/19/2022    LABALBU 4.2 01/31/2014    CREATININE 0.87 03/19/2022    CALCIUM 8.9 03/19/2022    GFRAA >60 03/19/2022    LABGLOM >60 03/19/2022    GLUCOSE 113 03/19/2022        Assessment:  Patient Active Problem List   Diagnosis    Mass of mediastinum    Bladder cancer (HCC)    Orthostatic dizziness    Iron deficiency anemia    Thrombocytopenia (HCC)    Syncope and collapse     Vasovagal syncope    Dehydration on top of CKD 2  End-of-life battery for pacemaker  Essential hypertension  Iron deficiency anemia  Thrombocytopenia  Type 2 diabetes with CKD 2  Coronary artery disease  plan:    Meds labs reviewed continue with physical therapy occupational therapy medically ready for discharge await skilled nursing facility continue with before meals and at bedtime Accu-Cheks with insulin coverage we will DC the Metformin because of the diarrhea avoid nephrotoxic drugs see orders    Albert Ndiaye MD MD  12:16 PM

## 2022-03-20 LAB
ABSOLUTE EOS #: 0.21 K/UL (ref 0–0.44)
ABSOLUTE IMMATURE GRANULOCYTE: 0.02 K/UL (ref 0–0.3)
ABSOLUTE LYMPH #: 1.14 K/UL (ref 1.1–3.7)
ABSOLUTE MONO #: 0.71 K/UL (ref 0.1–1.2)
ANION GAP SERPL CALCULATED.3IONS-SCNC: 11 MMOL/L (ref 9–17)
BASOPHILS # BLD: 1 % (ref 0–2)
BASOPHILS ABSOLUTE: 0.04 K/UL (ref 0–0.2)
BUN BLDV-MCNC: 12 MG/DL (ref 8–23)
BUN/CREAT BLD: 13 (ref 9–20)
CALCIUM SERPL-MCNC: 9.3 MG/DL (ref 8.6–10.4)
CHLORIDE BLD-SCNC: 103 MMOL/L (ref 98–107)
CO2: 25 MMOL/L (ref 20–31)
CREAT SERPL-MCNC: 0.9 MG/DL (ref 0.7–1.2)
EOSINOPHILS RELATIVE PERCENT: 4 % (ref 1–4)
GFR AFRICAN AMERICAN: >60 ML/MIN
GFR NON-AFRICAN AMERICAN: >60 ML/MIN
GFR SERPL CREATININE-BSD FRML MDRD: ABNORMAL ML/MIN/{1.73_M2}
GLUCOSE BLD-MCNC: 102 MG/DL (ref 70–99)
GLUCOSE BLD-MCNC: 218 MG/DL (ref 75–110)
GLUCOSE BLD-MCNC: 258 MG/DL (ref 75–110)
GLUCOSE BLD-MCNC: 71 MG/DL (ref 75–110)
GLUCOSE BLD-MCNC: 98 MG/DL (ref 75–110)
HCT VFR BLD CALC: 28.5 % (ref 40.7–50.3)
HEMOGLOBIN: 9.2 G/DL (ref 13–17)
IMMATURE GRANULOCYTES: 0 %
LYMPHOCYTES # BLD: 22 % (ref 24–43)
MCH RBC QN AUTO: 27.4 PG (ref 25.2–33.5)
MCHC RBC AUTO-ENTMCNC: 32.3 G/DL (ref 28.4–34.8)
MCV RBC AUTO: 84.8 FL (ref 82.6–102.9)
MONOCYTES # BLD: 14 % (ref 3–12)
NRBC AUTOMATED: 0 PER 100 WBC
PDW BLD-RTO: 12.5 % (ref 11.8–14.4)
PLATELET # BLD: 132 K/UL (ref 138–453)
PMV BLD AUTO: 11.3 FL (ref 8.1–13.5)
POTASSIUM SERPL-SCNC: 3.2 MMOL/L (ref 3.7–5.3)
RBC # BLD: 3.36 M/UL (ref 4.21–5.77)
SEG NEUTROPHILS: 59 % (ref 36–65)
SEGMENTED NEUTROPHILS ABSOLUTE COUNT: 3.08 K/UL (ref 1.5–8.1)
SODIUM BLD-SCNC: 139 MMOL/L (ref 135–144)
WBC # BLD: 5.2 K/UL (ref 3.5–11.3)

## 2022-03-20 PROCEDURE — 36415 COLL VENOUS BLD VENIPUNCTURE: CPT

## 2022-03-20 PROCEDURE — 6370000000 HC RX 637 (ALT 250 FOR IP): Performed by: INTERNAL MEDICINE

## 2022-03-20 PROCEDURE — 82947 ASSAY GLUCOSE BLOOD QUANT: CPT

## 2022-03-20 PROCEDURE — 2580000003 HC RX 258: Performed by: INTERNAL MEDICINE

## 2022-03-20 PROCEDURE — 80048 BASIC METABOLIC PNL TOTAL CA: CPT

## 2022-03-20 PROCEDURE — 2060000000 HC ICU INTERMEDIATE R&B

## 2022-03-20 PROCEDURE — 6360000002 HC RX W HCPCS: Performed by: INTERNAL MEDICINE

## 2022-03-20 PROCEDURE — 85025 COMPLETE CBC W/AUTO DIFF WBC: CPT

## 2022-03-20 RX ADMIN — CARVEDILOL 25 MG: 25 TABLET, FILM COATED ORAL at 17:45

## 2022-03-20 RX ADMIN — Medication 2000 UNITS: at 08:22

## 2022-03-20 RX ADMIN — ASPIRIN 81 MG: 81 TABLET, COATED ORAL at 08:22

## 2022-03-20 RX ADMIN — CLONIDINE HYDROCHLORIDE 0.1 MG: 0.1 TABLET ORAL at 12:58

## 2022-03-20 RX ADMIN — DONEPEZIL HYDROCHLORIDE 10 MG: 10 TABLET, FILM COATED ORAL at 20:07

## 2022-03-20 RX ADMIN — CLONIDINE HYDROCHLORIDE 0.1 MG: 0.1 TABLET ORAL at 20:07

## 2022-03-20 RX ADMIN — SODIUM CHLORIDE, PRESERVATIVE FREE 10 ML: 5 INJECTION INTRAVENOUS at 20:07

## 2022-03-20 RX ADMIN — ATORVASTATIN CALCIUM 20 MG: 20 TABLET, FILM COATED ORAL at 08:22

## 2022-03-20 RX ADMIN — FINASTERIDE 5 MG: 5 TABLET, FILM COATED ORAL at 08:22

## 2022-03-20 RX ADMIN — INSULIN LISPRO 3 UNITS: 100 INJECTION, SOLUTION INTRAVENOUS; SUBCUTANEOUS at 12:58

## 2022-03-20 RX ADMIN — MEMANTINE 10 MG: 10 TABLET ORAL at 20:07

## 2022-03-20 RX ADMIN — TAMSULOSIN HYDROCHLORIDE 0.4 MG: 0.4 CAPSULE ORAL at 08:22

## 2022-03-20 RX ADMIN — ENOXAPARIN SODIUM 40 MG: 100 INJECTION SUBCUTANEOUS at 08:22

## 2022-03-20 RX ADMIN — CLONIDINE HYDROCHLORIDE 0.1 MG: 0.1 TABLET ORAL at 08:22

## 2022-03-20 RX ADMIN — CARVEDILOL 25 MG: 25 TABLET, FILM COATED ORAL at 08:22

## 2022-03-20 RX ADMIN — FERROUS SULFATE TAB EC 325 MG (65 MG FE EQUIVALENT) 325 MG: 325 (65 FE) TABLET DELAYED RESPONSE at 08:22

## 2022-03-20 RX ADMIN — MEMANTINE 10 MG: 10 TABLET ORAL at 08:22

## 2022-03-20 RX ADMIN — SODIUM CHLORIDE, PRESERVATIVE FREE 10 ML: 5 INJECTION INTRAVENOUS at 08:22

## 2022-03-20 RX ADMIN — POTASSIUM CHLORIDE 40 MEQ: 20 TABLET, EXTENDED RELEASE ORAL at 08:22

## 2022-03-20 RX ADMIN — INSULIN LISPRO 1 UNITS: 100 INJECTION, SOLUTION INTRAVENOUS; SUBCUTANEOUS at 20:07

## 2022-03-20 ASSESSMENT — PAIN SCALES - GENERAL
PAINLEVEL_OUTOF10: 0
PAINLEVEL_OUTOF10: 0

## 2022-03-20 NOTE — PLAN OF CARE
Problem: Falls - Risk of:  Goal: Will remain free from falls  Description: Will remain free from falls  Outcome: Ongoing     Problem: Falls - Risk of:  Goal: Absence of physical injury  Description: Absence of physical injury  Outcome: Ongoing     Problem: Fluid Volume:  Goal: Maintenance of adequate hydration will improve  Description: Maintenance of adequate hydration will improve  Outcome: Ongoing     Problem: Metabolic:  Goal: Ability to maintain appropriate glucose levels will improve  Description: Ability to maintain appropriate glucose levels will improve  Outcome: Ongoing

## 2022-03-20 NOTE — PROGRESS NOTES
[facility] Hospitalist  Progress note      Subjective:    Syncope  No further syncope no dizziness no seizure  Sitting up  Ambulated  Fed well   Daughter at bedside  No new complaints      ROS  No fever no chills no GI/ complaints except some diarrhea, no cardiopulmonary complaints no TIA no bleeding no headache no sore throat no skin lesions no polyuria no polydipsia no hypo with  physical exam  General Appearance: in no acute distress and alert  Skin: warm and dry, no rash or erythema  Head: normocephalic and atraumatic  Eyes: Pupils equal round reactive pallor but no jaundice    Neck: neck supple and non tender without mass   Pulmonary/Chest: clear to auscultation bilaterally- no wheezes, rales or rhonchi, normal air movement, no respiratory distress  Cardiovascular: normal rate, regular rhythm, normal S1 and S2, no gallops, intact distal pulses and no carotid bruits  Abdomen: soft, non-tender, non-distended, normal bowel sounds, no masses or organomegaly  Extremities: no edema good pulses no Homans  Neurologic: Alert oriented x3 with no focal deficit    /71   Pulse 62   Temp 98.2 °F (36.8 °C) (Oral)   Resp 16   Ht 5' 8\" (1.727 m)   Wt 147 lb (66.7 kg)   SpO2 96%   BMI 22.35 kg/m²     CBC:   Lab Results   Component Value Date    WBC 5.2 03/20/2022    RBC 3.36 03/20/2022    HGB 9.2 03/20/2022    HCT 28.5 03/20/2022    MCV 84.8 03/20/2022    MCH 27.4 03/20/2022    MCHC 32.3 03/20/2022    RDW 12.5 03/20/2022     03/20/2022    MPV 11.3 03/20/2022     BMP:    Lab Results   Component Value Date     03/20/2022    K 3.2 03/20/2022     03/20/2022    CO2 25 03/20/2022    BUN 12 03/20/2022    LABALBU 4.2 01/31/2014    CREATININE 0.90 03/20/2022    CALCIUM 9.3 03/20/2022    GFRAA >60 03/20/2022    LABGLOM >60 03/20/2022    GLUCOSE 102 03/20/2022        Assessment:  Patient Active Problem List   Diagnosis    Mass of mediastinum    Bladder cancer (HCC)    Orthostatic dizziness    Iron deficiency anemia    Thrombocytopenia (HCC)    Syncope and collapse     Vasovagal syncope    Dehydration on top of CKD 2  End-of-life battery for pacemaker  Essential hypertension  Iron deficiency anemia  Thrombocytopenia  Type 2 diabetes with CKD 2  Coronary artery disease  Hypokalemia       plan:    Meds labs reviewed   continue with physical therapy occupational therapy   medically ready for discharge await skilled nursing facility   continue with before meals and at bedtime Accu-Cheks with insulin coverage   DC d Metformin already because of the diarrhea   avoid nephrotoxic drugs   see orders  Supplement Potassium   Await bed SNF        Louie Andrade MD MD  3:03 PM

## 2022-03-21 LAB
ABSOLUTE EOS #: 0.19 K/UL (ref 0–0.44)
ABSOLUTE IMMATURE GRANULOCYTE: 0.03 K/UL (ref 0–0.3)
ABSOLUTE LYMPH #: 1.37 K/UL (ref 1.1–3.7)
ABSOLUTE MONO #: 0.79 K/UL (ref 0.1–1.2)
ANION GAP SERPL CALCULATED.3IONS-SCNC: 12 MMOL/L (ref 9–17)
BASOPHILS # BLD: 1 % (ref 0–2)
BASOPHILS ABSOLUTE: 0.04 K/UL (ref 0–0.2)
BUN BLDV-MCNC: 16 MG/DL (ref 8–23)
BUN/CREAT BLD: 18 (ref 9–20)
CALCIUM SERPL-MCNC: 9.2 MG/DL (ref 8.6–10.4)
CHLORIDE BLD-SCNC: 102 MMOL/L (ref 98–107)
CO2: 24 MMOL/L (ref 20–31)
CREAT SERPL-MCNC: 0.91 MG/DL (ref 0.7–1.2)
EOSINOPHILS RELATIVE PERCENT: 3 % (ref 1–4)
GFR AFRICAN AMERICAN: >60 ML/MIN
GFR NON-AFRICAN AMERICAN: >60 ML/MIN
GFR SERPL CREATININE-BSD FRML MDRD: ABNORMAL ML/MIN/{1.73_M2}
GLUCOSE BLD-MCNC: 120 MG/DL (ref 75–110)
GLUCOSE BLD-MCNC: 132 MG/DL (ref 70–99)
GLUCOSE BLD-MCNC: 154 MG/DL (ref 75–110)
GLUCOSE BLD-MCNC: 164 MG/DL (ref 75–110)
HCT VFR BLD CALC: 26.3 % (ref 40.7–50.3)
HEMOGLOBIN: 8.9 G/DL (ref 13–17)
IMMATURE GRANULOCYTES: 1 %
LYMPHOCYTES # BLD: 24 % (ref 24–43)
MCH RBC QN AUTO: 28 PG (ref 25.2–33.5)
MCHC RBC AUTO-ENTMCNC: 33.8 G/DL (ref 28.4–34.8)
MCV RBC AUTO: 82.7 FL (ref 82.6–102.9)
MONOCYTES # BLD: 14 % (ref 3–12)
NRBC AUTOMATED: 0 PER 100 WBC
PDW BLD-RTO: 12.5 % (ref 11.8–14.4)
PLATELET # BLD: 142 K/UL (ref 138–453)
PMV BLD AUTO: 11.5 FL (ref 8.1–13.5)
POTASSIUM SERPL-SCNC: 3.7 MMOL/L (ref 3.7–5.3)
RBC # BLD: 3.18 M/UL (ref 4.21–5.77)
SEG NEUTROPHILS: 57 % (ref 36–65)
SEGMENTED NEUTROPHILS ABSOLUTE COUNT: 3.36 K/UL (ref 1.5–8.1)
SODIUM BLD-SCNC: 138 MMOL/L (ref 135–144)
WBC # BLD: 5.8 K/UL (ref 3.5–11.3)

## 2022-03-21 PROCEDURE — 97110 THERAPEUTIC EXERCISES: CPT

## 2022-03-21 PROCEDURE — 2060000000 HC ICU INTERMEDIATE R&B

## 2022-03-21 PROCEDURE — 6360000002 HC RX W HCPCS: Performed by: INTERNAL MEDICINE

## 2022-03-21 PROCEDURE — 97530 THERAPEUTIC ACTIVITIES: CPT

## 2022-03-21 PROCEDURE — 97116 GAIT TRAINING THERAPY: CPT

## 2022-03-21 PROCEDURE — 80048 BASIC METABOLIC PNL TOTAL CA: CPT

## 2022-03-21 PROCEDURE — 36415 COLL VENOUS BLD VENIPUNCTURE: CPT

## 2022-03-21 PROCEDURE — 6370000000 HC RX 637 (ALT 250 FOR IP): Performed by: INTERNAL MEDICINE

## 2022-03-21 PROCEDURE — 85025 COMPLETE CBC W/AUTO DIFF WBC: CPT

## 2022-03-21 PROCEDURE — 2580000003 HC RX 258: Performed by: INTERNAL MEDICINE

## 2022-03-21 PROCEDURE — 82947 ASSAY GLUCOSE BLOOD QUANT: CPT

## 2022-03-21 RX ORDER — SODIUM CHLORIDE 0.9 % (FLUSH) 0.9 %
5-40 SYRINGE (ML) INJECTION PRN
Status: DISCONTINUED | OUTPATIENT
Start: 2022-03-21 | End: 2022-03-22 | Stop reason: SDUPTHER

## 2022-03-21 RX ORDER — SODIUM CHLORIDE 9 MG/ML
25 INJECTION, SOLUTION INTRAVENOUS PRN
Status: DISCONTINUED | OUTPATIENT
Start: 2022-03-21 | End: 2022-03-22 | Stop reason: SDUPTHER

## 2022-03-21 RX ORDER — SODIUM CHLORIDE 0.9 % (FLUSH) 0.9 %
5-40 SYRINGE (ML) INJECTION EVERY 12 HOURS SCHEDULED
Status: DISCONTINUED | OUTPATIENT
Start: 2022-03-22 | End: 2022-03-22 | Stop reason: SDUPTHER

## 2022-03-21 RX ADMIN — INSULIN LISPRO 1 UNITS: 100 INJECTION, SOLUTION INTRAVENOUS; SUBCUTANEOUS at 12:06

## 2022-03-21 RX ADMIN — SODIUM CHLORIDE, PRESERVATIVE FREE 5 ML: 5 INJECTION INTRAVENOUS at 08:38

## 2022-03-21 RX ADMIN — CLONIDINE HYDROCHLORIDE 0.1 MG: 0.1 TABLET ORAL at 20:45

## 2022-03-21 RX ADMIN — FINASTERIDE 5 MG: 5 TABLET, FILM COATED ORAL at 08:37

## 2022-03-21 RX ADMIN — MEMANTINE 10 MG: 10 TABLET ORAL at 08:37

## 2022-03-21 RX ADMIN — CARVEDILOL 25 MG: 25 TABLET, FILM COATED ORAL at 16:58

## 2022-03-21 RX ADMIN — TAMSULOSIN HYDROCHLORIDE 0.4 MG: 0.4 CAPSULE ORAL at 08:37

## 2022-03-21 RX ADMIN — MEMANTINE 10 MG: 10 TABLET ORAL at 20:45

## 2022-03-21 RX ADMIN — CARVEDILOL 25 MG: 25 TABLET, FILM COATED ORAL at 08:37

## 2022-03-21 RX ADMIN — Medication 2000 UNITS: at 08:37

## 2022-03-21 RX ADMIN — CLONIDINE HYDROCHLORIDE 0.1 MG: 0.1 TABLET ORAL at 08:37

## 2022-03-21 RX ADMIN — ASPIRIN 81 MG: 81 TABLET, COATED ORAL at 08:37

## 2022-03-21 RX ADMIN — INSULIN LISPRO 1 UNITS: 100 INJECTION, SOLUTION INTRAVENOUS; SUBCUTANEOUS at 20:40

## 2022-03-21 RX ADMIN — INSULIN LISPRO 1 UNITS: 100 INJECTION, SOLUTION INTRAVENOUS; SUBCUTANEOUS at 16:58

## 2022-03-21 RX ADMIN — ATORVASTATIN CALCIUM 20 MG: 20 TABLET, FILM COATED ORAL at 08:37

## 2022-03-21 RX ADMIN — DONEPEZIL HYDROCHLORIDE 10 MG: 10 TABLET, FILM COATED ORAL at 20:45

## 2022-03-21 RX ADMIN — SODIUM CHLORIDE, PRESERVATIVE FREE 10 ML: 5 INJECTION INTRAVENOUS at 20:48

## 2022-03-21 RX ADMIN — FERROUS SULFATE TAB EC 325 MG (65 MG FE EQUIVALENT) 325 MG: 325 (65 FE) TABLET DELAYED RESPONSE at 08:37

## 2022-03-21 RX ADMIN — CLONIDINE HYDROCHLORIDE 0.1 MG: 0.1 TABLET ORAL at 13:32

## 2022-03-21 RX ADMIN — ENOXAPARIN SODIUM 40 MG: 100 INJECTION SUBCUTANEOUS at 08:37

## 2022-03-21 ASSESSMENT — PAIN SCALES - GENERAL: PAINLEVEL_OUTOF10: 0

## 2022-03-21 NOTE — PROGRESS NOTES
Physical Therapy  Facility/Department: [facility] PROGRESSIVE CARE  Daily Treatment Note  NAME: Ivan Lorenzo  : 1940  MRN: [id number]    Date of Service: 3/21/2022    Discharge Recommendations:  Patient would benefit from continued therapy after discharge      Due to recent hospitalization and medical condition, pt would benefit from additional therapy at time of discharge to ensure safety. Please refer to the AM-PAC score for current functional status. Assessment   Body structures, Functions, Activity limitations: Decreased functional mobility ; Decreased strength;Decreased safe awareness;Decreased balance;Decreased endurance; Increased pain  Assessment: Patient Progressing towards STG this date. Patient with some deficits most notable in turns during ambulation, patient requires a CGA for safety and stability. PT Education: Goals;PT Role;Plan of Care;General Safety;Transfer Training;Gait Training  Patient Education: Patient educated on safety awareness and proper positioning and use for RW. Activity Tolerance  Activity Tolerance: Patient limited by fatigue  Activity Tolerance: Markedly improved gait, transfer and ther ex tolerance. Patient Diagnosis(es): The primary encounter diagnosis was Syncope and collapse. A diagnosis of Orthostatic dizziness was also pertinent to this visit. has a past medical history of CAD (coronary artery disease), Cataract, Diabetes mellitus ([address] Goiter, nontoxic, multinodular, Hearing loss, bilateral, [place] (hard of hearing), Hyperlipidemia, Hypertension, Mediastinal mass, Osteoarthritis, Peptic ulcer, TIA (transient ischemic attack), Wears glasses, and Wears partial dentures. has a past surgical history that includes Cardiac surgery; Cardiac catheterization (,,); pacemaker placement (); Carpal tunnel release (Left); External ear surgery (Bilateral, ); sinus surgery;  Tonsillectomy; Mastoid surgery (); hernia repair; Vasectomy; back surgery (); Colonoscopy; and Mediastinoscopy (2/4/14). Restrictions  Restrictions/Precautions  Restrictions/Precautions: General Precautions,Fall Risk  Required Braces or Orthoses?: No  Implants present? : Pacemaker  Position Activity Restriction  Other position/activity restrictions: Up w/ assist, LUE IV access, ALARMS, ortho statics  Subjective   General  Chart Reviewed: Yes  Response To Previous Treatment: Patient with no complaints from previous session. Family / Caregiver Present: No  Subjective  Subjective: Patient agreeable to PT treatment  General Comment  Comments: RN & pt agreeable to PT. Pt resting in bed upon arrival.          Orientation  Orientation  Overall Orientation Status: Within Functional Limits  Cognition      Objective   Bed mobility  Rolling to Left: Contact guard assistance  Rolling to Right: Contact guard assistance  Supine to Sit: Contact guard assistance  Sit to Supine: Contact guard assistance  Scooting: Contact guard assistance  Comment: Patient with good bed mobility technique and proper hand placement. Patient requires MIN VC's to scoot all the way out and place feet flat on the floor for increased support and stability  Transfers  Sit to Stand: Contact guard assistance  Stand to sit: Contact guard assistance  Bed to Chair: Unable to assess  Stand Pivot Transfers: Contact guard assistance  Lateral Transfers: Contact guard assistance  Comment: Transfers with and with RW, vc's to push up/reach back with UE's to dec fall risk, however pt is not unsafe without use of UE's. Ambulation  Ambulation?: Yes  More Ambulation?: Yes  Ambulation 1  Surface: level tile  Device: Rolling Walker  Assistance: Contact guard assistance  Quality of Gait: antalgic gait, poor steadiness, lateral trunk sway  Gait Deviations: Decreased step length  Distance: 75 feet x1 20 feet x2  Comments: Patient requires CGA due to some unsteadiness in ambulation techniques most notable in turns.   Patient requires increased time for task managment skills. Neuromuscular Education  NDT Treatment: Gait ;Lower extremity; Sitting;Standing  Balance  Posture: Fair  Sitting - Static: Good  Sitting - Dynamic: Good  Standing - Static: -  Standing - Dynamic: Fair  Comments: Balance assessed with RW  Exercises  Hip Flexion: 10 x1  Hip Abduction: 10 x1 seated  Knee Long Arc Quad: 10 x1 seated  Ankle Pumps: 10 x1 seated  Comments: Pt able to perform BLE seated AROM program. Upon stance patient performs wieghtshifts x1 minute and standing marches X1. Patient assisted to restroom and back to bed for rest break upon conclusion of PT treatment     AM-PAC Score  AM-PAC Inpatient Mobility Raw Score : 14 (03/18/22 1114)  AM-PAC Inpatient T-Scale Score : 38.1 (03/18/22 1114)  Mobility Inpatient CMS 0-100% Score: 61.29 (03/18/22 1114)  Mobility Inpatient CMS G-Code Modifier : CL (03/18/22 1114)          Goals  Short term goals  Time Frame for Short term goals: 12 visits  Short term goal 1: Pt to demonstrate bed mobility Elmo-MET  Short term goal 2: Pt to perform STS transfers w/ least restrictive AD Elmo-MET  Short term goal 3: Pt to ambulate at least 100ft w/ least restrictive AD ModA-MET  Short term goal 4: Pt to actively participate in at least 30 minutes of physical therapy for ther act, ther ex, balance, gait, and endurance training  Patient Goals   Patient goals :  To go home    Plan    Plan  Times per week: 1-2x/day, 5-6x/week  Current Treatment Recommendations: Strengthening,Balance Training,Functional Mobility Training,Stair training,Gait Training,Transfer Training,Endurance Training,Safety Education & Training,Home Exercise Program,Equipment Evaluation, Education, & procurement,Patient/Caregiver Education & Training,Pain Management  Safety Devices  Type of devices: Nurse notified,All fall risk precautions in place,Gait belt,Bed alarm in place,Left in bed  Restraints  Initially in place: No     Therapy Time   Individual Concurrent Group Co-treatment Time In 1447         Time Out 1525         Minutes [address] [place], [place]

## 2022-03-21 NOTE — PROGRESS NOTES
Occupational Therapy  Facility/Department: [facility] PROGRESSIVE CARE  Daily Treatment Note  NAME: Elle Alvarado  : 1940  MRN: [id number]    Date of Service: 3/21/2022    Discharge Recommendations:  Patient would benefit from continued therapy after discharge   Pt currently functioning below baseline. Would suggest additional therapy at time of discharge to maximize long term outcomes and prevent re-admission. Please refer to AM-PAC score for current level of function. HARVINDER Prasad reports patient is medically stable for therapy treatment this date. Chart reviewed prior to treatment and patient is agreeable for therapy. All lines intact and patient positioned comfortably at end of treatment. All patient needs addressed prior to ending therapy session. Assessment   Performance deficits / Impairments: Decreased functional mobility ; Decreased ADL status; Decreased safe awareness;Decreased cognition;Decreased strength;Decreased endurance;Decreased fine motor control;Decreased high-level IADLs;Decreased posture;Decreased balance  Assessment: Pt progressed to 1 staff assist, as pt is more steady and increased standing chelsi. Skilled OT is indicated to increase overall IND with safety with self-care and functional tasks to return home when appropriate  Prognosis: Good  OT Education: OT Role;Transfer Training;Plan of Care;Energy Conservation; ADL Adaptive Strategies  Patient Education: safety in function, fall prevention/call light use, pacing, RW safety/mgmt  REQUIRES OT FOLLOW UP: Yes  Activity Tolerance  Activity Tolerance: Patient Tolerated treatment well  Safety Devices  Safety Devices in place: Yes  Type of devices: Nurse notified;Gait belt;Call light within reach; Patient at risk for falls; Left in bed;Bed alarm in place         Patient Diagnosis(es): The primary encounter diagnosis was Syncope and collapse. A diagnosis of Orthostatic dizziness was also pertinent to this visit.       has a past medical history of CAD (coronary artery disease), Cataract, Diabetes mellitus ([address] Goiter, nontoxic, multinodular, Hearing loss, bilateral, [place] (hard of hearing), Hyperlipidemia, Hypertension, Mediastinal mass, Osteoarthritis, Peptic ulcer, TIA (transient ischemic attack), Wears glasses, and Wears partial dentures. has a past surgical history that includes Cardiac surgery; Cardiac catheterization (2000,2003,2009); pacemaker placement (1999); Carpal tunnel release (Left); External ear surgery (Bilateral, 1959); sinus surgery; Tonsillectomy; Mastoid surgery (1959); hernia repair; Vasectomy; back surgery (2007); Colonoscopy; and Mediastinoscopy (2/4/14). Restrictions  Restrictions/Precautions  Restrictions/Precautions: General Precautions,Fall Risk  Required Braces or Orthoses?: No  Implants present? : Pacemaker  Position Activity Restriction  Other position/activity restrictions: Up w/ assist, LUE IV access, ALARMS, ortho statics  Subjective   General  Chart Reviewed: Yes  Patient assessed for rehabilitation services?: Yes  Family / Caregiver Present: No  Subjective  Subjective: \"I'm doing much better today, I think my leg is messed up from that fall I had before I came here\"  General Comment  Comments: Pt resting in chair, agreeable to OT tx      Orientation  Orientation  Overall Orientation Status: Within Functional Limits  Objective    ADL  Additional Comments: pt declined all ADLs this date        Balance  Sitting Balance: Stand by assistance  Standing Balance: Minimal assistance  Standing Balance  Time: standing ~4-5+ min  Activity: functional mobility    Functional Mobility  Functional - Mobility Device: Rolling Walker  Activity: Other (chair to door (3x), door to bed)  Assist Level: Minimal assistance  Functional Mobility Comments: Min A for safety, pt slightly unsteady during functional mob.  Pt required Mod verbal cues/tactile assist for upright posture, pacing self, RW safety, scanning environment and line mgmt all to increase safety. Pt with no reports of dizziness this date. Bed mobility  Supine to Sit:  (pt in chair at beginning of session)  Sit to Supine: Stand by assistance  Scooting: Stand by assistance  Comment: Min VCs for use of bed rails and proper bed mob tech. Pt requested to go to bed at end of session. Transfers  Sit to stand: Minimal assistance  Stand to sit: Minimal assistance  Transfer Comments: Mod VCs/tactile cues for RW safety/mgmt, upright posture, squaring self/AD to surface, controlled sit<>stand, all to inc safety/reduce fall risk. Pt with poor eccentric control with poor carryover of education. Cognition  Overall Cognitive Status: Exceptions  Arousal/Alertness: Appropriate responses to stimuli  Following Commands:  Follows multistep commands with increased time  Attention Span: Appears intact  Memory: Decreased recall of recent events;Decreased short term memory  Problem Solving: Assistance required to identify errors made;Assistance required to generate solutions;Assistance required to correct errors made  Insights: Decreased awareness of deficits  Initiation: Does not require cues  Sequencing: Requires cues for some                                         Plan   Plan  Times per week: 4-5x/wk 1x/day as chelsi  Current Treatment Recommendations: Strengthening,Endurance Training,Functional Mobility Training,Balance Training,Safety Education & Training,Home Management Training,Self-Care / ADL,Equipment Evaluation, Education, & procurement,Patient/Caregiver Education & Training    AM-PAC Score        AM-[facility] Inpatient Daily Activity Raw Score: 16 (03/21/22 1432)  AM-PAC Inpatient ADL T-Scale Score : 35.96 (03/21/22 1432)  ADL Inpatient CMS 0-100% Score: 53.32 (03/21/22 1432)  ADL Inpatient CMS G-Code Modifier : CK (03/21/22 1432)    Goals  Short term goals  Time Frame for Short term goals: By discharge, pt to demo  Short term goal 1: ADL tranfers and functional mobility to CGA with use of AD as needed. Short term goal 2: increased B UE strength by 1/2 grade to assist with functional tasks/I with simple B UE HEP with use of handouts as needed. Short term goal 3: bed mobility to Mod I with use of bedrails as needed. Short term goal 4: UB ADLs to Set up and LB ADLs to SBA with use of AD as needed. Short term goal 5: toileting to CGA with use of AD/grab bars as needed. Long term goals  Long term goal 1: Pt to be I with fall prevention educaiton, EC/WS tech, recommendations for discharge with use of handouts as needed. Patient Goals   Patient goals : To go home! Therapy Time   Individual Concurrent Group Co-treatment   Time In 1350         Time Out 1415         Minutes 25                 Upon writer exit, call light within reach, pt retired to bed. All lines intact and patient positioned comfortably. All patient needs addressed prior to ending therapy session. Chart reviewed prior to treatment and patient is agreeable for therapy. RN reports patient is medically stable for therapy treatment this date.     Annie Kang OTR/L

## 2022-03-21 NOTE — CARE COORDINATION
Social Work-Patient was denied admission to [facility] by Izaiah Ceron. Arranged P2P with Dr Moncho Pérez.  Gregory Sy

## 2022-03-21 NOTE — PROGRESS NOTES
Subjective:     Follow-up syncope  No more syncope no dizziness no seizure disorder  ROS  No fever no chills no GI/ complaints at present no cardiopulmonary complaints no TIA no bleeding no headache no sore throat no skin lesions no polyuria polydipsia or hypoglycemia  physical exam  General Appearance: in no acute distress and alert  Skin: warm and dry, no rash or erythema  Head: normocephalic and atraumatic  Eyes: pupils equal, round, and reactive to light, sclera anicteric and positive pallor    Neck: neck supple and non tender without mass   Pulmonary/Chest: clear to auscultation bilaterally- no wheezes, rales or rhonchi, normal air movement, no respiratory distress  Cardiovascular: normal rate, regular rhythm, normal S1 and S2, no gallops, intact distal pulses and no carotid bruits  Abdomen: soft, non-tender, non-distended, normal bowel sounds, no masses or organomegaly  Extremities: no edema and good pulses no Homans  Neurologic: Alert oriented x3 with no focal deficit    /66   Pulse 70   Temp 98.1 °F (36.7 °C) (Oral)   Resp 16   Ht 5' 8\" (1.727 m)   Wt 147 lb (66.7 kg)   SpO2 99%   BMI 22.35 kg/m²     CBC:   Lab Results   Component Value Date    WBC 5.8 03/21/2022    RBC 3.18 03/21/2022    HGB 8.9 03/21/2022    HCT 26.3 03/21/2022    MCV 82.7 03/21/2022    MCH 28.0 03/21/2022    MCHC 33.8 03/21/2022    RDW 12.5 03/21/2022     03/21/2022    MPV 11.5 03/21/2022     BMP:    Lab Results   Component Value Date     03/21/2022    K 3.7 03/21/2022     03/21/2022    CO2 24 03/21/2022    BUN 16 03/21/2022    LABALBU 4.2 01/31/2014    CREATININE 0.91 03/21/2022    CALCIUM 9.2 03/21/2022    GFRAA >60 03/21/2022    LABGLOM >60 03/21/2022    GLUCOSE 132 03/21/2022        Assessment:  Patient Active Problem List   Diagnosis    Mass of mediastinum    Bladder cancer (HCC)    Orthostatic dizziness    Iron deficiency anemia    Thrombocytopenia (HCC)    Syncope and collapse     Vasovagal syncope dehydration on top of CKD 2  Thrombocytopenia  End-of-life battery for pacemaker  Essential hypertension  Iron deficiency anemia  Type 2 diabetes with CKD 2  Coronary artery disease  Plan:    Meds labs reviewed continue with iron supplement avoid nephrotoxic drugs up with physical therapy occupational therapy continue with before meals and at bedtime Accu-Cheks with insulin coverage patient is ready for discharge still awaiting for skilled nursing facility    Jeff Calderon MD MD  5:44 PM

## 2022-03-21 NOTE — CARE COORDINATION
Discharge planning    Chart reviewed. SS working on placement and referral sent to [facility]. Will be precert. GI following for iron deficiency anemia and has recommended outpatient scope and has now signed off. Attending ready to discharge once snf obtained.      If goes to snf then need to notify [facility]

## 2022-03-21 NOTE — CARE COORDINATION
Social Work-Dr [facility]  Meghna Haney

## 2022-03-21 NOTE — PLAN OF CARE
Problem: Falls - Risk of:  Goal: Will remain free from falls  Description: Will remain free from falls  Outcome: Ongoing     Problem: Falls - Risk of:  Goal: Absence of physical injury  Description: Absence of physical injury  Outcome: Ongoing     Problem:  Activity:  Goal: Risk for activity intolerance will decrease  Description: Risk for activity intolerance will decrease  Outcome: Ongoing     Problem: Fluid Volume:  Goal: Maintenance of adequate hydration will improve  Description: Maintenance of adequate hydration will improve  Outcome: Ongoing

## 2022-03-21 NOTE — PLAN OF CARE
Problem: Falls - Risk of:  Goal: Will remain free from falls  Description: Will remain free from falls  3/21/2022 1152 by Adrian Montemayor RN  Outcome: Ongoing  Note: Patient is fall risk per fall scale. Hourly rounding performed. Personal belongings and call light within reach. Bed in low position. Stay with me sign in place. Bed alarm placed as well.   3/21/2022 0602 by Alysa Nieves RN  Outcome: Ongoing  Goal: Absence of physical injury  Description: Absence of physical injury  3/21/2022 1152 by Adrian Montemayor RN  Outcome: Ongoing  3/21/2022 0602 by Alysa Nieves RN  Outcome: Ongoing     Problem:  Activity:  Goal: Risk for activity intolerance will decrease  Description: Risk for activity intolerance will decrease  3/21/2022 0602 by Alysa Nieves RN  Outcome: Ongoing     Problem: Skin Integrity:  Goal: Risk for impaired skin integrity will decrease  Description: Risk for impaired skin integrity will decrease  Outcome: Ongoing

## 2022-03-22 VITALS
DIASTOLIC BLOOD PRESSURE: 87 MMHG | RESPIRATION RATE: 14 BRPM | BODY MASS INDEX: 22.28 KG/M2 | OXYGEN SATURATION: 97 % | WEIGHT: 147 LBS | HEIGHT: 68 IN | TEMPERATURE: 97.5 F | SYSTOLIC BLOOD PRESSURE: 113 MMHG | HEART RATE: 62 BPM

## 2022-03-22 LAB
ABSOLUTE EOS #: 0.18 K/UL (ref 0–0.44)
ABSOLUTE IMMATURE GRANULOCYTE: 0.03 K/UL (ref 0–0.3)
ABSOLUTE LYMPH #: 1.35 K/UL (ref 1.1–3.7)
ABSOLUTE MONO #: 0.62 K/UL (ref 0.1–1.2)
ANION GAP SERPL CALCULATED.3IONS-SCNC: 9 MMOL/L (ref 9–17)
BASOPHILS # BLD: 1 % (ref 0–2)
BASOPHILS ABSOLUTE: 0.04 K/UL (ref 0–0.2)
BUN BLDV-MCNC: 19 MG/DL (ref 8–23)
BUN/CREAT BLD: 17 (ref 9–20)
CALCIUM SERPL-MCNC: 9.3 MG/DL (ref 8.6–10.4)
CHLORIDE BLD-SCNC: 103 MMOL/L (ref 98–107)
CO2: 27 MMOL/L (ref 20–31)
CREAT SERPL-MCNC: 1.13 MG/DL (ref 0.7–1.2)
EOSINOPHILS RELATIVE PERCENT: 4 % (ref 1–4)
GFR AFRICAN AMERICAN: >60 ML/MIN
GFR NON-AFRICAN AMERICAN: >60 ML/MIN
GFR SERPL CREATININE-BSD FRML MDRD: ABNORMAL ML/MIN/{1.73_M2}
GLUCOSE BLD-MCNC: 112 MG/DL (ref 75–110)
GLUCOSE BLD-MCNC: 132 MG/DL (ref 70–99)
GLUCOSE BLD-MCNC: 173 MG/DL (ref 75–110)
GLUCOSE BLD-MCNC: 202 MG/DL (ref 75–110)
HCT VFR BLD CALC: 27.1 % (ref 40.7–50.3)
HEMOGLOBIN: 8.8 G/DL (ref 13–17)
IMMATURE GRANULOCYTES: 1 %
LYMPHOCYTES # BLD: 26 % (ref 24–43)
MCH RBC QN AUTO: 27.3 PG (ref 25.2–33.5)
MCHC RBC AUTO-ENTMCNC: 32.5 G/DL (ref 28.4–34.8)
MCV RBC AUTO: 84.2 FL (ref 82.6–102.9)
MONOCYTES # BLD: 12 % (ref 3–12)
NRBC AUTOMATED: 0 PER 100 WBC
PDW BLD-RTO: 12.5 % (ref 11.8–14.4)
PLATELET # BLD: 137 K/UL (ref 138–453)
PMV BLD AUTO: 11.1 FL (ref 8.1–13.5)
POTASSIUM SERPL-SCNC: 3.4 MMOL/L (ref 3.7–5.3)
RBC # BLD: 3.22 M/UL (ref 4.21–5.77)
SEG NEUTROPHILS: 56 % (ref 36–65)
SEGMENTED NEUTROPHILS ABSOLUTE COUNT: 2.9 K/UL (ref 1.5–8.1)
SODIUM BLD-SCNC: 139 MMOL/L (ref 135–144)
WBC # BLD: 5.1 K/UL (ref 3.5–11.3)

## 2022-03-22 PROCEDURE — 36415 COLL VENOUS BLD VENIPUNCTURE: CPT

## 2022-03-22 PROCEDURE — 80048 BASIC METABOLIC PNL TOTAL CA: CPT

## 2022-03-22 PROCEDURE — 97530 THERAPEUTIC ACTIVITIES: CPT

## 2022-03-22 PROCEDURE — 97535 SELF CARE MNGMENT TRAINING: CPT

## 2022-03-22 PROCEDURE — 6360000002 HC RX W HCPCS: Performed by: INTERNAL MEDICINE

## 2022-03-22 PROCEDURE — 85025 COMPLETE CBC W/AUTO DIFF WBC: CPT

## 2022-03-22 PROCEDURE — 6370000000 HC RX 637 (ALT 250 FOR IP): Performed by: INTERNAL MEDICINE

## 2022-03-22 PROCEDURE — 82947 ASSAY GLUCOSE BLOOD QUANT: CPT

## 2022-03-22 PROCEDURE — 2580000003 HC RX 258: Performed by: INTERNAL MEDICINE

## 2022-03-22 RX ORDER — LANOLIN ALCOHOL/MO/W.PET/CERES
325 CREAM (GRAM) TOPICAL
Qty: 90 TABLET | Refills: 0 | Status: SHIPPED | OUTPATIENT
Start: 2022-03-23

## 2022-03-22 RX ORDER — AMLODIPINE BESYLATE 2.5 MG/1
2.5 TABLET ORAL DAILY
Status: DISCONTINUED | OUTPATIENT
Start: 2022-03-22 | End: 2022-03-22 | Stop reason: HOSPADM

## 2022-03-22 RX ORDER — AMLODIPINE BESYLATE 2.5 MG/1
2.5 TABLET ORAL DAILY
Qty: 30 TABLET | Refills: 0 | Status: SHIPPED | OUTPATIENT
Start: 2022-03-23

## 2022-03-22 RX ADMIN — CARVEDILOL 25 MG: 25 TABLET, FILM COATED ORAL at 17:12

## 2022-03-22 RX ADMIN — ATORVASTATIN CALCIUM 20 MG: 20 TABLET, FILM COATED ORAL at 09:23

## 2022-03-22 RX ADMIN — ENOXAPARIN SODIUM 40 MG: 100 INJECTION SUBCUTANEOUS at 09:23

## 2022-03-22 RX ADMIN — CARVEDILOL 25 MG: 25 TABLET, FILM COATED ORAL at 09:22

## 2022-03-22 RX ADMIN — Medication 2000 UNITS: at 09:22

## 2022-03-22 RX ADMIN — FINASTERIDE 5 MG: 5 TABLET, FILM COATED ORAL at 09:22

## 2022-03-22 RX ADMIN — POTASSIUM CHLORIDE 40 MEQ: 20 TABLET, EXTENDED RELEASE ORAL at 06:12

## 2022-03-22 RX ADMIN — SODIUM CHLORIDE, PRESERVATIVE FREE 10 ML: 5 INJECTION INTRAVENOUS at 09:23

## 2022-03-22 RX ADMIN — CLONIDINE HYDROCHLORIDE 0.1 MG: 0.1 TABLET ORAL at 14:54

## 2022-03-22 RX ADMIN — FERROUS SULFATE TAB EC 325 MG (65 MG FE EQUIVALENT) 325 MG: 325 (65 FE) TABLET DELAYED RESPONSE at 09:23

## 2022-03-22 RX ADMIN — TAMSULOSIN HYDROCHLORIDE 0.4 MG: 0.4 CAPSULE ORAL at 09:23

## 2022-03-22 RX ADMIN — INSULIN LISPRO 2 UNITS: 100 INJECTION, SOLUTION INTRAVENOUS; SUBCUTANEOUS at 12:34

## 2022-03-22 RX ADMIN — ASPIRIN 81 MG: 81 TABLET, COATED ORAL at 09:22

## 2022-03-22 RX ADMIN — AMLODIPINE BESYLATE 2.5 MG: 2.5 TABLET ORAL at 09:22

## 2022-03-22 RX ADMIN — CLONIDINE HYDROCHLORIDE 0.1 MG: 0.1 TABLET ORAL at 09:22

## 2022-03-22 RX ADMIN — INSULIN LISPRO 1 UNITS: 100 INJECTION, SOLUTION INTRAVENOUS; SUBCUTANEOUS at 17:12

## 2022-03-22 RX ADMIN — MEMANTINE 10 MG: 10 TABLET ORAL at 09:22

## 2022-03-22 NOTE — PROGRESS NOTES
Syncope and collapse     Vasovagal syncope no recurrence  Thrombocytopenia  End-of-life battery for pacemaker  Essential hypertension  Iron deficiency anemia  Type 2 diabetes with CKD 2  CKD 2  Coronary artery disease  Plan:    Meds labs reviewed patient doing fairly well insurance refused skilled nursing facility therapy patient doing very well with a walker will send home with visiting home nurse home physical therapy occupational therapy see orders advised need to follow-up with PCP for possible GI work-up for his anemia      Gordon Morrison MD MD  4:29 PM

## 2022-03-22 NOTE — PROGRESS NOTES
Pt taken out per wheelchair. Patient discharged via private auto with family member (wife). Discharge paperwork and instructions given to patient. Patient  acknowledges understanding. Scripts given to Patient  For Iron, metformin and amlodipine. New medications and side effects explained. Follow up appointments reviewed (directions to make follow up appointment given)  Discharge checklist completed   ÓSCAR completed and signed per writer and physician for [facility]. Any questions answered.      Telemetry monitor returned

## 2022-03-22 NOTE — PROGRESS NOTES
Comprehensive Nutrition Assessment    Type and Reason for Visit:  Initial,RD Nutrition Re-Screen/LOS    Nutrition Recommendations/Plan:   1. Continue Adult Diet; Regular; Low Fat/ Low Chol/ High Fiber/ ANASTACIA   2. Monitor po intakes, GI status, and labs     Nutrition Assessment:  Patient assessed for LOS. Patient admission r/t syncope and collapse and orthostatic dizziness. Hx: Mass of mediastinum, Bladder cancer, CAD, DM, TIA and goiter. Pt had pacemaker battery replacement. He was experiencing loose stools- given immodium. He reports good appetite/intake - consuming % of meals. He reports good appetite PTA. No weight loss or \"maybe 1# weight loss). No N/V/D/C. Will continue current diet and monitor po intakes and need for ONS. Malnutrition Assessment:  Malnutrition Status: At risk for malnutrition (Comment)    Context:  Acute Illness     Findings of the 6 clinical characteristics of malnutrition:  Energy Intake:  Mild decrease in energy intake (Comment)  Weight Loss:  No significant weight loss     Body Fat Loss:  Unable to assess     Muscle Mass Loss:  Unable to assess    Fluid Accumulation:  No significant fluid accumulation     Strength:  Not Performed    Estimated Daily Nutrient Needs:  Energy (kcal):  6926-3386 kcal (27-29 kcal/kg); Weight Used for Energy Requirements:  Current     Protein (g):  75-87 gm protein (1.1-1.3 g/kg); Weight Used for Protein Requirements:  Current          Nutrition Related Findings:  Active bowel sounds. No edema. Labs reviewed. Wounds:  Surgical Incision       Current Nutrition Therapies:    ADULT DIET;  Regular; Low Fat/Low Chol/High Fiber/ANASTACIA    Anthropometric Measures:  · Height: 5' 8\" (172.7 cm)  · Current Body Weight: 147 lb (66.7 kg)   · Admission Body Weight: 147 lb (66.7 kg)    · Usual Body Weight: 147 lb (66.7 kg)     · Ideal Body Weight: 154 lbs; % Ideal Body Weight 95.5 %   · BMI: 22.4  · Adjusted Body Weight:  ; No Adjustment   · BMI Categories: Normal Weight (BMI 22.0 to 24.9) age over 72       Nutrition Diagnosis:   · Altered nutrition-related lab values related to endocrine dysfuntion as evidenced by lab values    Nutrition Interventions:   Food and/or Nutrient Delivery:  Continue Current Diet  Nutrition Education/Counseling:  Education not indicated   Coordination of Nutrition Care:  Continue to monitor while inpatient    Goals:  PO intakes to provide >75% of estimated nutritional needs       Nutrition Monitoring and Evaluation:   Behavioral-Environmental Outcomes:  None Identified   Food/Nutrient Intake Outcomes:  Food and Nutrient Intake  Physical Signs/Symptoms Outcomes:  Biochemical Data,Skin,Weight     Discharge Planning:    Continue current diet     Sabrina Potts, 64 [address], [address] [place] [address]  Office Number: [phone]

## 2022-03-22 NOTE — PLAN OF CARE
Problem: Falls - Risk of:  Goal: Will remain free from falls  Description: Will remain free from falls  Outcome: Ongoing  Note: Siderails up x 2  Hourly rounding. Call light in reach. Instructed to call for assist before attempting out of bed. Remains free from falls and accidental injury at this time. Floor free from obstacles, and bed is locked and in lowest position. Adequate lighting provided. Bed alarm on. Fall sticker on wristband.  Fall Sign posted in doorway   Goal: Absence of physical injury  Description: Absence of physical injury  Outcome: Ongoing     Problem: Falls - Risk of:  Goal: Absence of physical injury  Description: Absence of physical injury  Outcome: Ongoing

## 2022-03-22 NOTE — FLOWSHEET NOTE
[facility] NOTE    Room # 1010/1010-02   Name: Chrissy Cox              Reason for visit: Routine    I visited the patient. Admit Date & Time: 3/15/2022  3:40 PM    Assessment:  Chrissy Cox is a 78 y.o. male. Upon entering the room patient was sleeping. Intervention:   provided a ministry presence and brief prayer. Outcome:  Patient did not respond. Plan:  Chaplains will remain available to offer spiritual and emotional support as needed.     Electronically signed by Galileo Lopez on 3/22/2022 at 11:40 AM.  Ev         03/22/22 1139   Encounter Summary   Services provided to: Patient   Referral/Consult From: [facility] Family members   Continue Visiting   (3/22/22)   Complexity of Encounter Low   Length of Encounter 15 minutes   Routine   Type Follow up   Assessment Sleeping   Intervention Prayer   Outcome Did not respond

## 2022-03-22 NOTE — CARE COORDINATION
Discharge Planning    Informed by  Remi Fuelling that appeal for SNF has been denied. Pt updated and is agreeable to going home with home care. He will call his wife and let her know. Sandie at [facility] updated and can accept referral.    Nurse Ailyn updated. Sticky note to Dr Kelly Gamino.

## 2022-03-22 NOTE — PROGRESS NOTES
Occupational Therapy  Facility/Department: [facility] PROGRESSIVE CARE  Daily Treatment Note  NAME: Zara Crystal  : 1940  MRN: [id number]    RN Ailyn reports patient is medically stable for therapy treatment this date. Chart reviewed prior to treatment and patient is agreeable for therapy. All lines intact and patient positioned comfortably at end of treatment. All patient needs addressed prior to ending therapy session. Date of Service: 3/22/2022    Discharge Recommendations:  Patient would benefit from continued therapy after discharge   Due to recent hospitalization and medical condition, pt would benefit from additional therapy at time of discharge to ensure safety. Please refer to the AM-PAC score for current functional status. OT Equipment Recommendations  Equipment Needed: Yes  ADL Assistive Devices: Long-handled Shoe Horn;Long-handled Sponge;Reacher;Sock-Aid Hard;Emergency Alert System    Assessment   Performance deficits / Impairments: Decreased functional mobility ; Decreased ADL status; Decreased safe awareness;Decreased cognition;Decreased strength;Decreased endurance;Decreased fine motor control;Decreased high-level IADLs;Decreased posture;Decreased balance  Assessment: Pt is progressing towards goals, increased standing chelsi and steadiness noted this date. Pt would benefit from continued skilled OT services to increase I and safety during functional tasks to return [facility] at [facility] as able. Prognosis: Good  Decision Making: Medium Complexity  OT Education: OT Role;Transfer Training;Plan of Care;Energy Conservation; ADL Adaptive Strategies  Patient Education: benefits of being oob, safety in function, fall prevention /call light use, recommendations for continued therapy, pursed lip breathing tech  REQUIRES OT FOLLOW UP: Yes  Activity Tolerance  Activity Tolerance: Patient Tolerated treatment well  Activity Tolerance: fair +  Safety Devices  Safety Devices in place: Yes  Type of devices: Nurse notified;Gait belt;Call light within reach; Patient at risk for falls; Chair alarm in place; Left in chair (Pt sitting up in bedside chair at end of session eating breakfast)         Patient Diagnosis(es): The primary encounter diagnosis was Syncope and collapse. A diagnosis of Orthostatic dizziness was also pertinent to this visit. has a past medical history of CAD (coronary artery disease), Cataract, Diabetes mellitus ([address] Goiter, nontoxic, multinodular, Hearing loss, bilateral, [place] (hard of hearing), Hyperlipidemia, Hypertension, Mediastinal mass, Osteoarthritis, Peptic ulcer, TIA (transient ischemic attack), Wears glasses, and Wears partial dentures. has a past surgical history that includes Cardiac surgery; Cardiac catheterization (2000,2003,2009); pacemaker placement (1999); Carpal tunnel release (Left); External ear surgery (Bilateral, 1959); sinus surgery; Tonsillectomy; Mastoid surgery (1959); hernia repair; Vasectomy; back surgery (2007); Colonoscopy; and Mediastinoscopy (2/4/14). Restrictions  Restrictions/Precautions  Restrictions/Precautions: General Precautions,Fall Risk  Required Braces or Orthoses?: No  Implants present? : Pacemaker  Position Activity Restriction  Other position/activity restrictions: Up w/ assist, LUE IV, ALARMS, ortho statics       Subjective   General  Chart Reviewed: Yes  Patient assessed for rehabilitation services?: Yes  Family / Caregiver Present: No  Subjective  Subjective: \"well I am doing pretty good\"  General Comment  Comments: Pt resting in bed, agreeable to OT tx session.   Vital Signs  Patient Currently in Pain: Denies       Orientation  Orientation  Overall Orientation Status: Within Functional Limits         Objective    ADL  Grooming: Setup;Contact guard assistance (to stand at sink with RW to complete hair combing and face washing)  UE Dressing: Setup;Minimal assistance (to tie/adjust hosp gown seated on EOB)  Additional Comments: Pt declining all other ADLs at this time        Balance  Sitting Balance: Stand by assistance  Standing Balance: Contact guard assistance (with RW)  Standing Balance  Time: standing ~ 6-7 min  Activity: functional mobility and ADLs  Functional Mobility  Functional - Mobility Device: Rolling Walker  Activity:  (bed to door, door <> window x6 back to EOB (seated rest break), bed to door, door <> window x4, to bathroom sink, bathroom sink to EOB, EOB to door, door <> window x 4 to bedside chair)  Assist Level: Minimal assistance  Functional Mobility Comments: Pt given Min verbal cues/tactile assist for upright posture, pacing, RW safety/staying within RW, scanning environment all to increase safety. Bed mobility  Rolling to Left: Stand by assistance  Supine to Sit: Contact guard assistance  Sit to Supine: Unable to assess  Scooting: Contact guard assistance  Comment: Pt with good use of bedrails, Min verbal cues for pacing self, and proper bed mobility all to increase safety. Transfers  Sit to stand: Minimal assistance (with RW)  Stand to sit: Minimal assistance  Transfer Comments: Pt given Min verbal cues/tactile assist for RW safety, pacing self, upright posture, squaring self/AD to surface and reaching back prior to sitting, controlled stand to sit all to increase safety. Cognition  Overall Cognitive Status: Exceptions  Arousal/Alertness: Appropriate responses to stimuli  Following Commands:  Follows multistep commands with increased time  Attention Span: Appears intact  Memory: Decreased recall of recent events;Decreased short term memory  Problem Solving: Assistance required to identify errors made;Assistance required to generate solutions;Assistance required to correct errors made  Insights: Decreased awareness of deficits  Initiation: Does not require cues  Sequencing: Requires cues for some                          Plan   Plan  Times per week: 4-5x/wk 1x/day as chelsi  Current Treatment Recommendations: Strengthening,Endurance Training,Functional Mobility Training,Balance Training,Safety Education & Training,Home Management Training,Self-Care / ADL,Equipment Evaluation, Education, & procurement,Patient/Caregiver Education & Training                          AM-PAC Score        AM-PAC Inpatient Daily Activity Raw Score: 16 (03/22/22 1031)  AM-PAC Inpatient ADL T-Scale Score : 35.96 (03/22/22 1031)  ADL Inpatient CMS 0-100% Score: 53.32 (03/22/22 1031)  ADL Inpatient CMS G-Code Modifier : CK (03/22/22 1031)        Goals  Short term goals  Time Frame for Short term goals: By discharge, pt to demo  Short term goal 1: ADL tranfers and functional mobility to Mod I with use of AD as needed. (MET 3/22 - Modified by Jay Galeas)  Short term goal 2: increased B UE strength by 1/2 grade to assist with functional tasks/I with simple B UE HEP with use of handouts as needed. Short term goal 3: bed mobility to Mod I with use of bedrails as needed. Short term goal 4: UB ADLs to Set up and LB ADLs to SBA with use of AD as needed. Short term goal 5: toileting to CGA with use of AD/grab bars as needed. Long term goals  Long term goal 1: Pt to be I with fall prevention JO ANN baltazar/WS tech, recommendations for discharge with use of handouts as needed. Patient Goals   Patient goals : To go home! Therapy Time   Individual Concurrent Group Co-treatment   Time In 5898         Time Out 0914         Minutes 45              Writer educates patient on creating a safe home of removing throw rugs off floor, additional lighting into bathroom and outside by stairs,purchasing non slip mat in shower, moving cords and other items off the floor, rearranging furniture for safe r/w use throughout home, using DME of reacher, shower chair and grab bars in bathroom.        Pankaj Pittman, OT

## 2022-03-22 NOTE — DISCHARGE SUMMARY
Physician Discharge Summary     Patient ID:  Selina Nageotte  [id number]  50 y.o.  1940    Admit date: 3/15/2022    Discharge date and time: 3/22/2022  Admission Diagnoses:   Patient Active Problem List   Diagnosis    Mass of mediastinum    Bladder cancer ([address]    Orthostatic dizziness    Iron deficiency anemia    Thrombocytopenia (HCC)    Syncope and collapse       Discharge Diagnoses:   Vasovagal syncope  Dehydration on top of CKD 2  End-of-life battery for pacemaker  Essential hypertension  Type 2 diabetes with CKD 2  CKD 2  Coronary artery disease  Iron deficiency anemia  Mild thrombocytopenia  Consults: cardiology and GI    Procedures: Change in battery for pacemaker    Hospital Course patient was a syncopal episode found to have elevated creatinine IV fluids were given admitted to monitor diarrhea orthostatic blood pressures he was seen by cardiology as well as GI for his iron deficiency anemia he did fairly well with no major complications the length of stay was prolonged due to waiting for pre-CERT from his insurance which was denied twice    Discharge Exam:  See progress note from today    Disposition: home  Stable improved  Patient Instructions:   Current Discharge Medication List      START taking these medications    Details   ferrous sulfate (FE TABS 325) 325 (65 Fe) MG EC tablet Take 1 tablet by mouth daily (with breakfast)  Qty: 90 tablet, Refills: 0         CONTINUE these medications which have CHANGED    Details   amLODIPine (NORVASC) 2.5 MG tablet Take 1 tablet by mouth daily  Qty: 30 tablet, Refills: 0         CONTINUE these medications which have NOT CHANGED    Details   donepezil (ARICEPT) 10 MG tablet Take 10 mg by mouth nightly      memantine (NAMENDA) 10 MG tablet Take 10 mg by mouth 2 times daily      aspirin 81 MG tablet Take 81 mg by mouth daily. carvedilol (COREG) 25 MG tablet Take 25 mg by mouth 2 times daily (with meals).       cloNIDine (CATAPRES) 0.1 MG tablet Take 0.1 mg by mouth 3 times daily. tamsulosin (FLOMAX) 0.4 MG capsule Take 0.4 mg by mouth daily. potassium chloride SA (K-DUR;KLOR-CON M) 10 MEQ tablet Take 10 mEq by mouth daily. Multiple Vitamins-Minerals (THERAPEUTIC MULTIVITAMIN-MINERALS) tablet Take 1 tablet by mouth daily. lisinopril-hydrochlorothiazide (PRINZIDE) 20-12.5 MG per tablet Take 1 tablet by mouth 2 times daily. finasteride (PROSCAR) 5 MG tablet Take 5 mg by mouth daily. simvastatin (ZOCOR) 40 MG tablet Take 40 mg by mouth nightly. Cholecalciferol (VITAMIN D) 2000 UNITS CAPS capsule Take 1 capsule by mouth daily. STOP taking these medications       betamethasone dipropionate 0.05 % cream Comments:   Reason for Stopping:         metFORMIN (GLUCOPHAGE) 1000 MG tablet Comments:   Reason for Stopping:             Activity: Up with a walker  Diet: cardiac diet and diabetic diet    Follow-up with PCP in 5 days. Patient advised need to follow-up with his GI for work-up for iron deficiency anemia, community referral completed more than 30 minutes spent on discharge  Signed:   Paola Rowley MD MD  3/22/2022  4:30 PM

## 2022-03-22 NOTE — PLAN OF CARE
Problem: Falls - Risk of:  Goal: Will remain free from falls  Description: Will remain free from falls  3/21/2022 2103 by Wale Dexter RN  Outcome: Ongoing     Problem: Falls - Risk of:  Goal: Absence of physical injury  Description: Absence of physical injury  3/21/2022 2103 by Wale Dexter RN  Outcome: Ongoing     Problem:  Activity:  Goal: Risk for activity intolerance will decrease  Description: Risk for activity intolerance will decrease  Outcome: Ongoing     Problem: Coping:  Goal: Ability to adjust to condition or change in health will improve  Description: Ability to adjust to condition or change in health will improve  Outcome: Ongoing     Problem: Fluid Volume:  Goal: Maintenance of adequate hydration will improve  Description: Maintenance of adequate hydration will improve  Outcome: Ongoing     Problem: Physical Regulation:  Goal: Complications related to the disease process, condition or treatment will be avoided or minimized  Description: Complications related to the disease process, condition or treatment will be avoided or minimized  Outcome: Ongoing     Problem: Skin Integrity:  Goal: Risk for impaired skin integrity will decrease  Description: Risk for impaired skin integrity will decrease  3/21/2022 2103 by Wale Dexter RN  Outcome: Ongoing     Problem: Tissue Perfusion:  Goal: Adequacy of tissue perfusion will improve  Description: Adequacy of tissue perfusion will improve  Outcome: Ongoing     Problem: Cardiac:  Goal: Hemodynamic stability will improve  Description: Hemodynamic stability will improve  Outcome: Ongoing     Problem: Respiratory:  Goal: Ability to maintain adequate ventilation will improve  Description: Ability to maintain adequate ventilation will improve  Outcome: Ongoing

## 2022-03-22 NOTE — CARE COORDINATION
Discharge Planning    Sandie at [facility] informed pt discharging today and she will obtain ÓSCAR

## 2022-03-22 NOTE — PLAN OF CARE
Nutrition Problem #1: Altered nutrition-related lab values  Intervention: Food and/or Nutrient Delivery: Continue Current Diet  Nutritional Goals: PO intakes to provide >75% of estimated nutritional needs

## 2022-03-22 NOTE — CARE COORDINATION
Social Work-Patient was denied admission to SNF. Discussed with daughter,Priscilla. They are trying to find transportation home for him tonight. Discussed that ambulette could be arrange. She would like to find transport.  Yessi Tellez

## 2023-04-04 NOTE — OP NOTE
[place] text 8528 and serial number [id number] implanted on 9/23/2009 made by [address] [place] [address].  The new pacemaker pulse generator was Tisha LORENZANA model #7568 serial [id number] made by [address] [place] [address]      Atrial lead: Model number tendril SDX 1388 ,serial number [id number] made by [facility]. [employer]    Sensing threshold 0.6 mV    Pacing thresd 1.25 V    Ventricular lead:  Model number tendril SDX 1388, serial number [id number] made by [facility] threshold 3.8 mV    Pacing threshold 1.375 V    The pulse generator was reprogrammed to DDD mode lower rate 70 and upper rate 115 with adequate safety margins. Estimated b;oold loss: 5 ml  Conclusion:    Successful explantation of old pacemaker pulse generator   Successful revision of the pacemaker pocket inferiorly to accommodate the new pulse generator   Successful implantation of new pacemaker pulse generator   Plan: Routine post-pacemaker care and follow-up.   See orders      Endy Méndez MD    CC: Diane Sandoval MD  CC: Samantha Crowell MD Intermediate / Complex Repair - Final Wound Length In Cm: 3.8

## 2025-03-27 ENCOUNTER — HOSPITAL ENCOUNTER (INPATIENT)
Age: 85
LOS: 7 days | Discharge: NURSING, REHAB OR LONG-TERM CARE | DRG: 480 | End: 2025-04-03
Attending: EMERGENCY MEDICINE | Admitting: SURGERY
Payer: MEDICARE

## 2025-03-27 ENCOUNTER — APPOINTMENT (OUTPATIENT)
Dept: CT IMAGING | Age: 85
End: 2025-03-27
Payer: MEDICARE

## 2025-03-27 ENCOUNTER — APPOINTMENT (OUTPATIENT)
Dept: GENERAL RADIOLOGY | Age: 85
End: 2025-03-27
Payer: MEDICARE

## 2025-03-27 ENCOUNTER — APPOINTMENT (OUTPATIENT)
Dept: GENERAL RADIOLOGY | Age: 85
DRG: 480 | End: 2025-03-27
Payer: MEDICARE

## 2025-03-27 ENCOUNTER — HOSPITAL ENCOUNTER (EMERGENCY)
Age: 85
Discharge: ANOTHER HOSPITAL | End: 2025-03-27
Attending: EMERGENCY MEDICINE
Payer: MEDICARE

## 2025-03-27 VITALS
RESPIRATION RATE: 16 BRPM | DIASTOLIC BLOOD PRESSURE: 68 MMHG | HEART RATE: 77 BPM | SYSTOLIC BLOOD PRESSURE: 154 MMHG | HEIGHT: 66 IN | BODY MASS INDEX: 21.69 KG/M2 | WEIGHT: 135 LBS | OXYGEN SATURATION: 100 % | TEMPERATURE: 98.2 F

## 2025-03-27 DIAGNOSIS — S72.141A CLOSED COMMINUTED INTERTROCHANTERIC FRACTURE OF PROXIMAL FEMUR, RIGHT, INITIAL ENCOUNTER: Primary | ICD-10-CM

## 2025-03-27 DIAGNOSIS — J18.9 PNEUMONIA OF RIGHT LOWER LOBE DUE TO INFECTIOUS ORGANISM: ICD-10-CM

## 2025-03-27 DIAGNOSIS — J18.9 PNEUMONIA DUE TO INFECTIOUS ORGANISM, UNSPECIFIED LATERALITY, UNSPECIFIED PART OF LUNG: Primary | ICD-10-CM

## 2025-03-27 DIAGNOSIS — S72.001A CLOSED FRACTURE OF RIGHT HIP, INITIAL ENCOUNTER (HCC): ICD-10-CM

## 2025-03-27 LAB
25(OH)D3 SERPL-MCNC: 34.5 NG/ML (ref 30–100)
ANION GAP SERPL CALCULATED.3IONS-SCNC: 10 MMOL/L (ref 9–16)
BASOPHILS # BLD: <0.03 K/UL (ref 0–0.2)
BASOPHILS NFR BLD: 0 % (ref 0–2)
BNP SERPL-MCNC: ABNORMAL PG/ML (ref 0–450)
BUN SERPL-MCNC: 23 MG/DL (ref 8–23)
CALCIUM SERPL-MCNC: 9.3 MG/DL (ref 8.8–10.2)
CHLORIDE SERPL-SCNC: 107 MMOL/L (ref 98–107)
CK SERPL-CCNC: 67 U/L (ref 39–308)
CO2 SERPL-SCNC: 25 MMOL/L (ref 20–31)
CREAT SERPL-MCNC: 1.2 MG/DL (ref 0.7–1.2)
EOSINOPHIL # BLD: 0.08 K/UL (ref 0–0.44)
EOSINOPHILS RELATIVE PERCENT: 1 % (ref 1–4)
ERYTHROCYTE [DISTWIDTH] IN BLOOD BY AUTOMATED COUNT: 13.1 % (ref 11.8–14.4)
ETHANOL PERCENT: <0.01 %
ETHANOLAMINE SERPL-MCNC: <10 MG/DL (ref 0–0.08)
FLUAV RNA RESP QL NAA+PROBE: NOT DETECTED
FLUBV RNA RESP QL NAA+PROBE: NOT DETECTED
GFR, ESTIMATED: 61 ML/MIN/1.73M2
GLUCOSE BLD-MCNC: 187 MG/DL (ref 75–110)
GLUCOSE SERPL-MCNC: 208 MG/DL (ref 82–115)
HCT VFR BLD AUTO: 27.4 % (ref 40.7–50.3)
HGB BLD-MCNC: 8.9 G/DL (ref 13–17)
IMM GRANULOCYTES # BLD AUTO: 0.04 K/UL (ref 0–0.3)
IMM GRANULOCYTES NFR BLD: 1 %
INR PPP: 1.1
LYMPHOCYTES NFR BLD: 0.85 K/UL (ref 1.1–3.7)
LYMPHOCYTES RELATIVE PERCENT: 11 % (ref 24–43)
MCH RBC QN AUTO: 28.3 PG (ref 25.2–33.5)
MCHC RBC AUTO-ENTMCNC: 32.5 G/DL (ref 28.4–34.8)
MCV RBC AUTO: 87.3 FL (ref 82.6–102.9)
MONOCYTES NFR BLD: 0.8 K/UL (ref 0.1–1.2)
MONOCYTES NFR BLD: 10 % (ref 3–12)
MYOGLOBIN SERPL-MCNC: 137 NG/ML (ref 28–72)
NEUTROPHILS NFR BLD: 78 % (ref 36–65)
NEUTS SEG NFR BLD: 6.25 K/UL (ref 1.5–8.1)
NRBC BLD-RTO: 0 PER 100 WBC
PARTIAL THROMBOPLASTIN TIME: 33.2 SEC (ref 23.9–33.8)
PLATELET # BLD AUTO: 136 K/UL (ref 138–453)
PMV BLD AUTO: 11.9 FL (ref 8.1–13.5)
POTASSIUM SERPL-SCNC: 3.9 MMOL/L (ref 3.7–5.3)
PROTHROMBIN TIME: 14.3 SEC (ref 11.5–14.2)
RBC # BLD AUTO: 3.14 M/UL (ref 4.21–5.77)
SARS-COV-2 RNA RESP QL NAA+PROBE: NOT DETECTED
SODIUM SERPL-SCNC: 142 MMOL/L (ref 136–145)
SOURCE: NORMAL
SPECIMEN DESCRIPTION: NORMAL
TROPONIN I SERPL HS-MCNC: 38 NG/L (ref 0–22)
TROPONIN I SERPL HS-MCNC: 43 NG/L (ref 0–22)
WBC OTHER # BLD: 8 K/UL (ref 3.5–11.3)

## 2025-03-27 PROCEDURE — 93005 ELECTROCARDIOGRAM TRACING: CPT

## 2025-03-27 PROCEDURE — 84484 ASSAY OF TROPONIN QUANT: CPT

## 2025-03-27 PROCEDURE — 6360000002 HC RX W HCPCS

## 2025-03-27 PROCEDURE — 96365 THER/PROPH/DIAG IV INF INIT: CPT

## 2025-03-27 PROCEDURE — 6360000002 HC RX W HCPCS: Performed by: EMERGENCY MEDICINE

## 2025-03-27 PROCEDURE — 83874 ASSAY OF MYOGLOBIN: CPT

## 2025-03-27 PROCEDURE — 82306 VITAMIN D 25 HYDROXY: CPT

## 2025-03-27 PROCEDURE — 72128 CT CHEST SPINE W/O DYE: CPT

## 2025-03-27 PROCEDURE — 85610 PROTHROMBIN TIME: CPT

## 2025-03-27 PROCEDURE — 2580000003 HC RX 258: Performed by: EMERGENCY MEDICINE

## 2025-03-27 PROCEDURE — 2500000003 HC RX 250 WO HCPCS

## 2025-03-27 PROCEDURE — 83880 ASSAY OF NATRIURETIC PEPTIDE: CPT

## 2025-03-27 PROCEDURE — 72131 CT LUMBAR SPINE W/O DYE: CPT

## 2025-03-27 PROCEDURE — G0480 DRUG TEST DEF 1-7 CLASSES: HCPCS

## 2025-03-27 PROCEDURE — 71045 X-RAY EXAM CHEST 1 VIEW: CPT

## 2025-03-27 PROCEDURE — 85730 THROMBOPLASTIN TIME PARTIAL: CPT

## 2025-03-27 PROCEDURE — 2060000000 HC ICU INTERMEDIATE R&B

## 2025-03-27 PROCEDURE — 93005 ELECTROCARDIOGRAM TRACING: CPT | Performed by: EMERGENCY MEDICINE

## 2025-03-27 PROCEDURE — 80048 BASIC METABOLIC PNL TOTAL CA: CPT

## 2025-03-27 PROCEDURE — 6370000000 HC RX 637 (ALT 250 FOR IP)

## 2025-03-27 PROCEDURE — 70450 CT HEAD/BRAIN W/O DYE: CPT

## 2025-03-27 PROCEDURE — 99285 EMERGENCY DEPT VISIT HI MDM: CPT

## 2025-03-27 PROCEDURE — 1200000000 HC SEMI PRIVATE

## 2025-03-27 PROCEDURE — 82550 ASSAY OF CK (CPK): CPT

## 2025-03-27 PROCEDURE — 72125 CT NECK SPINE W/O DYE: CPT

## 2025-03-27 PROCEDURE — 85025 COMPLETE CBC W/AUTO DIFF WBC: CPT

## 2025-03-27 PROCEDURE — 73521 X-RAY EXAM HIPS BI 2 VIEWS: CPT

## 2025-03-27 PROCEDURE — 73552 X-RAY EXAM OF FEMUR 2/>: CPT

## 2025-03-27 PROCEDURE — 72192 CT PELVIS W/O DYE: CPT

## 2025-03-27 PROCEDURE — 87636 SARSCOV2 & INF A&B AMP PRB: CPT

## 2025-03-27 PROCEDURE — 82947 ASSAY GLUCOSE BLOOD QUANT: CPT

## 2025-03-27 PROCEDURE — 99222 1ST HOSP IP/OBS MODERATE 55: CPT | Performed by: SURGERY

## 2025-03-27 RX ORDER — TAMSULOSIN HYDROCHLORIDE 0.4 MG/1
0.4 CAPSULE ORAL DAILY
Status: CANCELLED | OUTPATIENT
Start: 2025-03-27

## 2025-03-27 RX ORDER — ASPIRIN 81 MG/1
81 TABLET, CHEWABLE ORAL DAILY
Status: DISCONTINUED | OUTPATIENT
Start: 2025-03-27 | End: 2025-03-28

## 2025-03-27 RX ORDER — LANOLIN ALCOHOL/MO/W.PET/CERES
CREAM (GRAM) TOPICAL
Status: ON HOLD | COMMUNITY
Start: 2025-03-12 | End: 2025-03-31 | Stop reason: HOSPADM

## 2025-03-27 RX ORDER — MULTIVITAMIN WITH FOLIC ACID 400 MCG
TABLET ORAL
Status: ON HOLD | COMMUNITY
Start: 2025-03-12 | End: 2025-03-31 | Stop reason: HOSPADM

## 2025-03-27 RX ORDER — LANOLIN ALCOHOL/MO/W.PET/CERES
1000 CREAM (GRAM) TOPICAL DAILY
COMMUNITY

## 2025-03-27 RX ORDER — MEMANTINE HYDROCHLORIDE 10 MG/1
10 TABLET ORAL 2 TIMES DAILY
Status: CANCELLED | OUTPATIENT
Start: 2025-03-27

## 2025-03-27 RX ORDER — SODIUM CHLORIDE 9 MG/ML
INJECTION, SOLUTION INTRAVENOUS PRN
Status: CANCELLED | OUTPATIENT
Start: 2025-03-27

## 2025-03-27 RX ORDER — SODIUM CHLORIDE 9 MG/ML
INJECTION, SOLUTION INTRAVENOUS PRN
Status: DISCONTINUED | OUTPATIENT
Start: 2025-03-27 | End: 2025-04-03 | Stop reason: HOSPADM

## 2025-03-27 RX ORDER — SODIUM CHLORIDE 0.9 % (FLUSH) 0.9 %
5-40 SYRINGE (ML) INJECTION PRN
Status: DISCONTINUED | OUTPATIENT
Start: 2025-03-27 | End: 2025-04-03 | Stop reason: HOSPADM

## 2025-03-27 RX ORDER — CARVEDILOL 25 MG/1
25 TABLET ORAL 2 TIMES DAILY WITH MEALS
Status: CANCELLED | OUTPATIENT
Start: 2025-03-27

## 2025-03-27 RX ORDER — AMLODIPINE BESYLATE 5 MG/1
2.5 TABLET ORAL DAILY
Status: CANCELLED | OUTPATIENT
Start: 2025-03-27

## 2025-03-27 RX ORDER — ASCORBIC ACID 500 MG
500 TABLET ORAL DAILY
COMMUNITY

## 2025-03-27 RX ORDER — ACETAMINOPHEN 500 MG
1000 TABLET ORAL EVERY 8 HOURS SCHEDULED
Status: DISCONTINUED | OUTPATIENT
Start: 2025-03-27 | End: 2025-04-03 | Stop reason: HOSPADM

## 2025-03-27 RX ORDER — SODIUM CHLORIDE 0.9 % (FLUSH) 0.9 %
5-40 SYRINGE (ML) INJECTION PRN
Status: CANCELLED | OUTPATIENT
Start: 2025-03-27

## 2025-03-27 RX ORDER — POTASSIUM CHLORIDE 1500 MG/1
40 TABLET, EXTENDED RELEASE ORAL PRN
Status: CANCELLED | OUTPATIENT
Start: 2025-03-27

## 2025-03-27 RX ORDER — ATORVASTATIN CALCIUM 10 MG/1
TABLET, FILM COATED ORAL
COMMUNITY
Start: 2025-03-17

## 2025-03-27 RX ORDER — ACETAMINOPHEN 650 MG/1
650 SUPPOSITORY RECTAL EVERY 6 HOURS PRN
Status: CANCELLED | OUTPATIENT
Start: 2025-03-27

## 2025-03-27 RX ORDER — ALOGLIPTIN 12.5 MG/1
12.5 TABLET, FILM COATED ORAL DAILY
Status: DISCONTINUED | OUTPATIENT
Start: 2025-03-27 | End: 2025-03-28

## 2025-03-27 RX ORDER — INSULIN LISPRO 100 [IU]/ML
0-16 INJECTION, SOLUTION INTRAVENOUS; SUBCUTANEOUS
Status: DISCONTINUED | OUTPATIENT
Start: 2025-03-27 | End: 2025-04-03 | Stop reason: HOSPADM

## 2025-03-27 RX ORDER — VITAMIN B COMPLEX
2000 TABLET ORAL DAILY
Status: DISCONTINUED | OUTPATIENT
Start: 2025-03-28 | End: 2025-04-03 | Stop reason: HOSPADM

## 2025-03-27 RX ORDER — CARVEDILOL 25 MG/1
25 TABLET ORAL 2 TIMES DAILY WITH MEALS
Status: DISCONTINUED | OUTPATIENT
Start: 2025-03-27 | End: 2025-03-31

## 2025-03-27 RX ORDER — ONDANSETRON 2 MG/ML
4 INJECTION INTRAMUSCULAR; INTRAVENOUS EVERY 6 HOURS PRN
Status: CANCELLED | OUTPATIENT
Start: 2025-03-27

## 2025-03-27 RX ORDER — POLYETHYLENE GLYCOL 3350 17 G/17G
17 POWDER, FOR SOLUTION ORAL DAILY PRN
Status: CANCELLED | OUTPATIENT
Start: 2025-03-27

## 2025-03-27 RX ORDER — CLONIDINE HYDROCHLORIDE 0.1 MG/1
0.1 TABLET ORAL 3 TIMES DAILY
Status: CANCELLED | OUTPATIENT
Start: 2025-03-27

## 2025-03-27 RX ORDER — FINASTERIDE 5 MG/1
5 TABLET, FILM COATED ORAL DAILY
Status: DISCONTINUED | OUTPATIENT
Start: 2025-03-27 | End: 2025-04-03 | Stop reason: HOSPADM

## 2025-03-27 RX ORDER — ASCORBIC ACID 500 MG
500 TABLET ORAL DAILY
Status: DISCONTINUED | OUTPATIENT
Start: 2025-03-27 | End: 2025-04-03 | Stop reason: HOSPADM

## 2025-03-27 RX ORDER — ACETAMINOPHEN 325 MG/1
650 TABLET ORAL EVERY 6 HOURS PRN
Status: CANCELLED | OUTPATIENT
Start: 2025-03-27

## 2025-03-27 RX ORDER — AMLODIPINE BESYLATE 5 MG/1
2.5 TABLET ORAL DAILY
Status: DISCONTINUED | OUTPATIENT
Start: 2025-03-28 | End: 2025-03-30

## 2025-03-27 RX ORDER — MEMANTINE HYDROCHLORIDE 5 MG/1
10 TABLET ORAL 2 TIMES DAILY
Status: DISCONTINUED | OUTPATIENT
Start: 2025-03-27 | End: 2025-04-03 | Stop reason: HOSPADM

## 2025-03-27 RX ORDER — MAGNESIUM SULFATE IN WATER 40 MG/ML
2000 INJECTION, SOLUTION INTRAVENOUS PRN
Status: CANCELLED | OUTPATIENT
Start: 2025-03-27

## 2025-03-27 RX ORDER — OXYCODONE HYDROCHLORIDE 5 MG/1
5 TABLET ORAL EVERY 4 HOURS PRN
Refills: 0 | Status: DISCONTINUED | OUTPATIENT
Start: 2025-03-27 | End: 2025-04-01

## 2025-03-27 RX ORDER — LANOLIN ALCOHOL/MO/W.PET/CERES
400 CREAM (GRAM) TOPICAL 2 TIMES DAILY
Status: DISCONTINUED | OUTPATIENT
Start: 2025-03-27 | End: 2025-04-03 | Stop reason: HOSPADM

## 2025-03-27 RX ORDER — CLONIDINE HYDROCHLORIDE 0.1 MG/1
0.1 TABLET ORAL 3 TIMES DAILY
Status: DISCONTINUED | OUTPATIENT
Start: 2025-03-27 | End: 2025-04-03 | Stop reason: HOSPADM

## 2025-03-27 RX ORDER — SODIUM CHLORIDE 0.9 % (FLUSH) 0.9 %
5-40 SYRINGE (ML) INJECTION EVERY 12 HOURS SCHEDULED
Status: CANCELLED | OUTPATIENT
Start: 2025-03-27

## 2025-03-27 RX ORDER — FINASTERIDE 5 MG/1
5 TABLET, FILM COATED ORAL DAILY
Status: CANCELLED | OUTPATIENT
Start: 2025-03-27

## 2025-03-27 RX ORDER — SODIUM CHLORIDE 9 MG/ML
INJECTION, SOLUTION INTRAVENOUS CONTINUOUS
Status: DISCONTINUED | OUTPATIENT
Start: 2025-03-27 | End: 2025-03-27 | Stop reason: HOSPADM

## 2025-03-27 RX ORDER — ATORVASTATIN CALCIUM 20 MG/1
20 TABLET, FILM COATED ORAL DAILY
Status: CANCELLED | OUTPATIENT
Start: 2025-03-27

## 2025-03-27 RX ORDER — HEPARIN SODIUM 5000 [USP'U]/ML
5000 INJECTION, SOLUTION INTRAVENOUS; SUBCUTANEOUS EVERY 8 HOURS SCHEDULED
Status: DISCONTINUED | OUTPATIENT
Start: 2025-03-27 | End: 2025-04-03 | Stop reason: HOSPADM

## 2025-03-27 RX ORDER — ATORVASTATIN CALCIUM 20 MG/1
10 TABLET, FILM COATED ORAL DAILY
Status: DISCONTINUED | OUTPATIENT
Start: 2025-03-28 | End: 2025-04-03 | Stop reason: HOSPADM

## 2025-03-27 RX ORDER — POLYETHYLENE GLYCOL 3350 17 G/17G
17 POWDER, FOR SOLUTION ORAL DAILY
Status: DISCONTINUED | OUTPATIENT
Start: 2025-03-27 | End: 2025-04-03 | Stop reason: HOSPADM

## 2025-03-27 RX ORDER — POTASSIUM CHLORIDE 7.45 MG/ML
10 INJECTION INTRAVENOUS PRN
Status: CANCELLED | OUTPATIENT
Start: 2025-03-27

## 2025-03-27 RX ORDER — SODIUM CHLORIDE 0.9 % (FLUSH) 0.9 %
5-40 SYRINGE (ML) INJECTION EVERY 12 HOURS SCHEDULED
Status: DISCONTINUED | OUTPATIENT
Start: 2025-03-27 | End: 2025-04-03 | Stop reason: HOSPADM

## 2025-03-27 RX ORDER — ONDANSETRON 2 MG/ML
4 INJECTION INTRAMUSCULAR; INTRAVENOUS EVERY 6 HOURS PRN
Status: DISCONTINUED | OUTPATIENT
Start: 2025-03-27 | End: 2025-04-03 | Stop reason: HOSPADM

## 2025-03-27 RX ORDER — LEVOFLOXACIN 5 MG/ML
750 INJECTION, SOLUTION INTRAVENOUS ONCE
Status: COMPLETED | OUTPATIENT
Start: 2025-03-27 | End: 2025-03-27

## 2025-03-27 RX ORDER — FERROUS SULFATE 325(65) MG
325 TABLET, DELAYED RELEASE (ENTERIC COATED) ORAL
Status: CANCELLED | OUTPATIENT
Start: 2025-03-28

## 2025-03-27 RX ORDER — DONEPEZIL HYDROCHLORIDE 10 MG/1
10 TABLET, FILM COATED ORAL NIGHTLY
Status: DISCONTINUED | OUTPATIENT
Start: 2025-03-27 | End: 2025-04-03 | Stop reason: HOSPADM

## 2025-03-27 RX ORDER — MULTIVITAMIN WITH IRON
1000 TABLET ORAL DAILY
Status: DISCONTINUED | OUTPATIENT
Start: 2025-03-27 | End: 2025-04-03 | Stop reason: HOSPADM

## 2025-03-27 RX ORDER — FERROUS SULFATE 325(65) MG
325 TABLET, DELAYED RELEASE (ENTERIC COATED) ORAL
Status: DISCONTINUED | OUTPATIENT
Start: 2025-03-28 | End: 2025-04-03 | Stop reason: HOSPADM

## 2025-03-27 RX ORDER — DONEPEZIL HYDROCHLORIDE 10 MG/1
10 TABLET, FILM COATED ORAL NIGHTLY
Status: CANCELLED | OUTPATIENT
Start: 2025-03-27

## 2025-03-27 RX ORDER — DOXYCYCLINE 100 MG/1
100 CAPSULE ORAL EVERY 12 HOURS SCHEDULED
Status: CANCELLED | OUTPATIENT
Start: 2025-03-27

## 2025-03-27 RX ORDER — ONDANSETRON 4 MG/1
4 TABLET, ORALLY DISINTEGRATING ORAL EVERY 8 HOURS PRN
Status: DISCONTINUED | OUTPATIENT
Start: 2025-03-27 | End: 2025-04-03 | Stop reason: HOSPADM

## 2025-03-27 RX ORDER — ONDANSETRON 4 MG/1
4 TABLET, ORALLY DISINTEGRATING ORAL EVERY 8 HOURS PRN
Status: CANCELLED | OUTPATIENT
Start: 2025-03-27

## 2025-03-27 RX ORDER — MAGNESIUM OXIDE 400 MG/1
400 TABLET ORAL 2 TIMES DAILY
COMMUNITY

## 2025-03-27 RX ORDER — SITAGLIPTIN 100 MG/1
100 TABLET, FILM COATED ORAL DAILY
COMMUNITY

## 2025-03-27 RX ADMIN — FINASTERIDE 5 MG: 5 TABLET, FILM COATED ORAL at 21:20

## 2025-03-27 RX ADMIN — SODIUM CHLORIDE, PRESERVATIVE FREE 10 ML: 5 INJECTION INTRAVENOUS at 21:26

## 2025-03-27 RX ADMIN — MEMANTINE 10 MG: 5 TABLET ORAL at 21:19

## 2025-03-27 RX ADMIN — OXYCODONE HYDROCHLORIDE 5 MG: 5 TABLET ORAL at 16:46

## 2025-03-27 RX ADMIN — CARVEDILOL 25 MG: 25 TABLET, FILM COATED ORAL at 21:16

## 2025-03-27 RX ADMIN — DONEPEZIL HYDROCHLORIDE 10 MG: 10 TABLET, FILM COATED ORAL at 21:19

## 2025-03-27 RX ADMIN — ACETAMINOPHEN 1000 MG: 500 TABLET ORAL at 21:18

## 2025-03-27 RX ADMIN — SODIUM CHLORIDE: 9 INJECTION, SOLUTION INTRAVENOUS at 14:13

## 2025-03-27 RX ADMIN — Medication 400 MG: at 21:21

## 2025-03-27 RX ADMIN — LEVOFLOXACIN 750 MG: 750 INJECTION, SOLUTION INTRAVENOUS at 13:54

## 2025-03-27 RX ADMIN — OXYCODONE HYDROCHLORIDE AND ACETAMINOPHEN 500 MG: 500 TABLET ORAL at 21:21

## 2025-03-27 RX ADMIN — HEPARIN SODIUM 5000 UNITS: 5000 INJECTION INTRAVENOUS; SUBCUTANEOUS at 21:22

## 2025-03-27 RX ADMIN — CYANOCOBALAMIN TAB 500 MCG 1000 MCG: 500 TAB at 21:20

## 2025-03-27 ASSESSMENT — PAIN SCALES - GENERAL
PAINLEVEL_OUTOF10: 6
PAINLEVEL_OUTOF10: 0
PAINLEVEL_OUTOF10: 7

## 2025-03-27 ASSESSMENT — ENCOUNTER SYMPTOMS
CHEST TIGHTNESS: 0
SHORTNESS OF BREATH: 0
BACK PAIN: 0
ABDOMINAL PAIN: 0
EYE DISCHARGE: 0
EYE PAIN: 0
FACIAL SWELLING: 0
ABDOMINAL DISTENTION: 0

## 2025-03-27 ASSESSMENT — LIFESTYLE VARIABLES
HOW OFTEN DO YOU HAVE A DRINK CONTAINING ALCOHOL: NEVER
HOW MANY STANDARD DRINKS CONTAINING ALCOHOL DO YOU HAVE ON A TYPICAL DAY: PATIENT DOES NOT DRINK

## 2025-03-27 ASSESSMENT — PAIN - FUNCTIONAL ASSESSMENT: PAIN_FUNCTIONAL_ASSESSMENT: 0-10

## 2025-03-27 NOTE — ED PROVIDER NOTES
EMERGENCY DEPARTMENT ENCOUNTER    Pt Name: Brandon Smart  MRN: [id number]  Birthdate 1940  Date of evaluation: 3/27/25  CHIEF COMPLAINT       Chief Complaint   Patient presents with    Fall     Found on bathroom floor by wife. States he lost balance reachin for railing in bathroom, fell, hit head on door, denies loc. C/o right hip/thigh pain. Unable to get back up. States on floor approx 40 min. Poor historian. On eliquis.     Hip Pain     right    Head Injury     HISTORY OF PRESENT ILLNESS   HPI   The patient is a 84-year-old male who presented to the emergency department by EMS from home secondary to fall.  Patient sustained a fall found on the bathroom floor by his wife approximately 40 minutes.  EMS was called and patient difficulty standing or bearing weight and patient was transported to the ER for further eval and treatment.  Patient complains of pain localized to his right hip.  He is unsure if he hit his head.  Patient takes Eliquis.  Patient denies lightheaded dizziness or chest pain prior to fall.  Patient shortness of breath, nausea, vomiting, fevers or chills.      REVIEW OF SYSTEMS     Review of Systems   Constitutional:  Negative for chills, diaphoresis and fever.   HENT:  Negative for congestion, ear pain and facial swelling.    Eyes:  Negative for pain, discharge and visual disturbance.   Respiratory:  Negative for chest tightness and shortness of breath.    Cardiovascular:  Negative for chest pain and palpitations.   Gastrointestinal:  Negative for abdominal distention and abdominal pain.   Genitourinary:  Negative for difficulty urinating and flank pain.   Musculoskeletal:  Negative for back pain.        Right hip pain   Skin:  Negative for wound.   Neurological:  Negative for dizziness, light-headedness and headaches.     PASTMEDICAL HISTORY     Past Medical History:   Diagnosis Date    CAD (coronary artery disease)     Cataract     BILAT.    Diabetes mellitus (HCC)     Goiter, nontoxic,

## 2025-03-27 NOTE — ED NOTES
Family at bedside. Family does not know what medications pt currently takes. They states they will try and provide a list.

## 2025-03-27 NOTE — ED PROVIDER NOTES
[facility] EMERGENCY DEPARTMENT  Emergency Department Encounter  Emergency Medicine Resident     Pt Name:Brandon Smart  MRN: [id number]  Birthdate 1940  Date of evaluation: 3/27/25  PCP:  Mone Chavis MD  Note Started: 4:13 PM EDT      CHIEF COMPLAINT       Chief Complaint   Patient presents with    Hip Injury     Right; [facility] transfer       HISTORY OF PRESENT ILLNESS  (Location/Symptom, Timing/Onset, Context/Setting, Quality, Duration, Modifying Factors, Severity.)      Brandon Smart is a 84 y.o. male with history of CAD, diabetes, hyperlipidemia, hypertension, dementia who presents as a transfer from [facility] with a right IT fracture.  Patient was found down in his bathroom.  Patient had reported that he had fell.  Patient however is an unreliable historian as he is only AAO x 2.  At outlying facility, patient had labs, CT imaging, and x-rays done which only showed a right IT fracture.  Patient's chest x-ray also concerning for possible pneumonia and did receive a dose of Levaquin prior to transfer.  Patient only complaining of pain to his right leg on arrival to our ER.  Unclear if patient is on any blood thinners as he and his wife are not great historians    PAST MEDICAL / SURGICAL / SOCIAL / FAMILY HISTORY      has a past medical history of CAD (coronary artery disease), Cataract, Diabetes mellitus (HCC), Goiter, nontoxic, multinodular, Hearing loss, bilateral, [place] (hard of hearing), Hyperlipidemia, Hypertension, Mediastinal mass, Osteoarthritis, Peptic ulcer, TIA (transient ischemic attack), Wears glasses, and Wears partial dentures.     has a past surgical history that includes Cardiac surgery; Cardiac catheterization (2000,2003,2009); pacemaker placement (1999); Carpal tunnel release (Left); External ear surgery (Bilateral, 1959); sinus surgery; Tonsillectomy; Mastoid surgery (1959); hernia repair; Vasectomy; back surgery (2007); Colonoscopy; and Mediastinoscopy

## 2025-03-27 NOTE — ED NOTES
Pt placed on 2L O2 via NC due to Low SPO2. Pt states he does not wear home O2. Dr. Sullivan made aware.

## 2025-03-27 NOTE — ED PROVIDER NOTES
[facility] EMERGENCY DEPARTMENT     Emergency Department     Faculty Attestation        I performed a history and physical examination of the patient and discussed management with the resident. I reviewed the resident’s note and agree with the documented findings and plan of care. Any areas of disagreement are noted on the chart. I was personally present for the key portions of any procedures. I have documented in the chart those procedures where I was not present during the key portions. I have reviewed the emergency nurses triage note. I agree with the chief complaint, past medical history, past surgical history, allergies, medications, social and family history as documented unless otherwise noted below.  For Physician Assistant/ Nurse Practitioner cases/documentation I have personally evaluated this patient and have completed at least one if not all key elements of the E/M (history, physical exam, and MDM). Additional findings are as noted.      Vital Signs: BP: (!) 154/80  Pulse: 70  Respirations: 24    SpO2: 97 %  PCP:  Mone Chavis MD  Note Started: 3/27/25, 3:47 PM EDT    Pertinent Comments:     Patient is a 4-year-old male with history of dementia with transfer from outlying facility with right hip fracture.    Neurovascular intact distally.    Awaiting Ortho consultation as well as admission    Critical Care  None      (Please note that portions of this note were completed with a voice recognition program. Efforts were made to edit the dictations but occasionally words are mis-transcribed. Whenever words are used in this note in any gender, they shall be construed as though they were used in the gender appropriate to the circumstances; and whenever words are used in this note in the singular or plural form, they shall be construed as though they were used in the form appropriate to the circumstances.)    Lowell Jordan MD [facility]  Attending

## 2025-03-28 ENCOUNTER — APPOINTMENT (OUTPATIENT)
Dept: GENERAL RADIOLOGY | Age: 85
DRG: 480 | End: 2025-03-28
Payer: MEDICARE

## 2025-03-28 ENCOUNTER — ANESTHESIA EVENT (OUTPATIENT)
Dept: OPERATING ROOM | Age: 85
End: 2025-03-28
Payer: MEDICARE

## 2025-03-28 ENCOUNTER — ANESTHESIA (OUTPATIENT)
Dept: OPERATING ROOM | Age: 85
End: 2025-03-28
Payer: MEDICARE

## 2025-03-28 PROBLEM — Z01.818 PRE-OP EVALUATION: Status: ACTIVE | Noted: 2025-03-28

## 2025-03-28 PROBLEM — I49.5 SSS (SICK SINUS SYNDROME) (HCC): Status: ACTIVE | Noted: 2025-03-28

## 2025-03-28 LAB
ALBUMIN SERPL-MCNC: 3 G/DL (ref 3.5–5.2)
AMPHET UR QL SCN: NEGATIVE
ANION GAP SERPL CALCULATED.3IONS-SCNC: 12 MMOL/L (ref 9–16)
ANION GAP SERPL CALCULATED.3IONS-SCNC: 19 MMOL/L (ref 9–16)
BACTERIA URNS QL MICRO: ABNORMAL
BARBITURATES UR QL SCN: NEGATIVE
BASOPHILS # BLD: <0.03 K/UL (ref 0–0.2)
BASOPHILS NFR BLD: 0 % (ref 0–2)
BENZODIAZ UR QL: NEGATIVE
BILIRUB UR QL STRIP: NEGATIVE
BUN SERPL-MCNC: 29 MG/DL (ref 8–23)
BUN SERPL-MCNC: 32 MG/DL (ref 8–23)
CALCIUM SERPL-MCNC: 9.4 MG/DL (ref 8.6–10.4)
CALCIUM SERPL-MCNC: 9.6 MG/DL (ref 8.6–10.4)
CANNABINOIDS UR QL SCN: NEGATIVE
CASTS #/AREA URNS LPF: ABNORMAL /LPF (ref 0–8)
CHLORIDE SERPL-SCNC: 107 MMOL/L (ref 98–107)
CHLORIDE SERPL-SCNC: 108 MMOL/L (ref 98–107)
CLARITY UR: CLEAR
CO2 SERPL-SCNC: 18 MMOL/L (ref 20–31)
CO2 SERPL-SCNC: 24 MMOL/L (ref 20–31)
COCAINE UR QL SCN: NEGATIVE
COLOR UR: YELLOW
CREAT SERPL-MCNC: 1.2 MG/DL (ref 0.7–1.2)
CREAT SERPL-MCNC: 1.3 MG/DL (ref 0.7–1.2)
EKG ATRIAL RATE: 35 BPM
EKG ATRIAL RATE: 37 BPM
EKG P AXIS: 49 DEGREES
EKG Q-T INTERVAL: 480 MS
EKG Q-T INTERVAL: 484 MS
EKG QRS DURATION: 134 MS
EKG QRS DURATION: 164 MS
EKG QTC CALCULATION (BAZETT): 543 MS
EKG QTC CALCULATION (BAZETT): 660 MS
EKG R AXIS: -71 DEGREES
EKG R AXIS: 131 DEGREES
EKG T AXIS: -40 DEGREES
EKG T AXIS: 107 DEGREES
EKG VENTRICULAR RATE: 112 BPM
EKG VENTRICULAR RATE: 77 BPM
EOSINOPHIL # BLD: <0.03 K/UL (ref 0–0.44)
EOSINOPHILS RELATIVE PERCENT: 0 % (ref 1–4)
EPI CELLS #/AREA URNS HPF: ABNORMAL /HPF (ref 0–5)
ERYTHROCYTE [DISTWIDTH] IN BLOOD BY AUTOMATED COUNT: 13.1 % (ref 11.8–14.4)
ERYTHROCYTE [DISTWIDTH] IN BLOOD BY AUTOMATED COUNT: 13.2 % (ref 11.8–14.4)
EST. AVERAGE GLUCOSE BLD GHB EST-MCNC: 160 MG/DL
FENTANYL UR QL: NEGATIVE
FOLATE SERPL-MCNC: >40 NG/ML (ref 4.8–24.2)
GFR, ESTIMATED: 54 ML/MIN/1.73M2
GFR, ESTIMATED: 59 ML/MIN/1.73M2
GLUCOSE BLD-MCNC: 161 MG/DL (ref 75–110)
GLUCOSE BLD-MCNC: 182 MG/DL (ref 75–110)
GLUCOSE SERPL-MCNC: 172 MG/DL (ref 74–99)
GLUCOSE SERPL-MCNC: 191 MG/DL (ref 74–99)
GLUCOSE UR STRIP-MCNC: NEGATIVE MG/DL
HBA1C MFR BLD: 7.2 % (ref 4–6)
HCT VFR BLD AUTO: 23.2 % (ref 40.7–50.3)
HCT VFR BLD AUTO: 26.6 % (ref 40.7–50.3)
HGB BLD-MCNC: 7.3 G/DL (ref 13–17)
HGB BLD-MCNC: 8.4 G/DL (ref 13–17)
HGB UR QL STRIP.AUTO: NEGATIVE
IMM GRANULOCYTES # BLD AUTO: 0.05 K/UL (ref 0–0.3)
IMM GRANULOCYTES NFR BLD: 1 %
KETONES UR STRIP-MCNC: ABNORMAL MG/DL
LEUKOCYTE ESTERASE UR QL STRIP: NEGATIVE
LYMPHOCYTES NFR BLD: 1.06 K/UL (ref 1.1–3.7)
LYMPHOCYTES RELATIVE PERCENT: 13 % (ref 24–43)
MCH RBC QN AUTO: 27 PG (ref 25.2–33.5)
MCH RBC QN AUTO: 27.6 PG (ref 25.2–33.5)
MCHC RBC AUTO-ENTMCNC: 31.5 G/DL (ref 28.4–34.8)
MCHC RBC AUTO-ENTMCNC: 31.6 G/DL (ref 28.4–34.8)
MCV RBC AUTO: 85.9 FL (ref 82.6–102.9)
MCV RBC AUTO: 87.5 FL (ref 82.6–102.9)
METHADONE UR QL: NEGATIVE
MONOCYTES NFR BLD: 0.77 K/UL (ref 0.1–1.2)
MONOCYTES NFR BLD: 9 % (ref 3–12)
NEUTROPHILS NFR BLD: 77 % (ref 36–65)
NEUTS SEG NFR BLD: 6.33 K/UL (ref 1.5–8.1)
NITRITE UR QL STRIP: NEGATIVE
NRBC BLD-RTO: 0 PER 100 WBC
NRBC BLD-RTO: 0 PER 100 WBC
OPIATES UR QL SCN: NEGATIVE
OXYCODONE UR QL SCN: ABNORMAL
PCP UR QL SCN: NEGATIVE
PH UR STRIP: 5.5 [PH] (ref 5–8)
PLATELET # BLD AUTO: 127 K/UL (ref 138–453)
PLATELET # BLD AUTO: 150 K/UL (ref 138–453)
PMV BLD AUTO: 11.7 FL (ref 8.1–13.5)
PMV BLD AUTO: 12.1 FL (ref 8.1–13.5)
POTASSIUM SERPL-SCNC: 4 MMOL/L (ref 3.7–5.3)
POTASSIUM SERPL-SCNC: 4.2 MMOL/L (ref 3.7–5.3)
PROT UR STRIP-MCNC: ABNORMAL MG/DL
RBC # BLD AUTO: 2.7 M/UL (ref 4.21–5.77)
RBC # BLD AUTO: 3.04 M/UL (ref 4.21–5.77)
RBC #/AREA URNS HPF: ABNORMAL /HPF (ref 0–4)
SODIUM SERPL-SCNC: 143 MMOL/L (ref 136–145)
SODIUM SERPL-SCNC: 145 MMOL/L (ref 136–145)
SP GR UR STRIP: 1.02 (ref 1–1.03)
T4 FREE SERPL-MCNC: 1.5 NG/DL (ref 0.92–1.68)
TEST INFORMATION: ABNORMAL
TSH SERPL DL<=0.05 MIU/L-ACNC: 0.92 UIU/ML (ref 0.27–4.2)
UROBILINOGEN UR STRIP-ACNC: NORMAL EU/DL (ref 0–1)
VIT B12 SERPL-MCNC: >2000 PG/ML (ref 232–1245)
WBC #/AREA URNS HPF: ABNORMAL /HPF (ref 0–5)
WBC OTHER # BLD: 13.1 K/UL (ref 3.5–11.3)
WBC OTHER # BLD: 8.2 K/UL (ref 3.5–11.3)

## 2025-03-28 PROCEDURE — 6370000000 HC RX 637 (ALT 250 FOR IP)

## 2025-03-28 PROCEDURE — 2580000003 HC RX 258: Performed by: STUDENT IN AN ORGANIZED HEALTH CARE EDUCATION/TRAINING PROGRAM

## 2025-03-28 PROCEDURE — 76942 ECHO GUIDE FOR BIOPSY: CPT | Performed by: ANESTHESIOLOGY

## 2025-03-28 PROCEDURE — 7100000001 HC PACU RECOVERY - ADDTL 15 MIN: Performed by: ORTHOPAEDIC SURGERY

## 2025-03-28 PROCEDURE — 99222 1ST HOSP IP/OBS MODERATE 55: CPT | Performed by: INTERNAL MEDICINE

## 2025-03-28 PROCEDURE — 6360000002 HC RX W HCPCS

## 2025-03-28 PROCEDURE — C1769 GUIDE WIRE: HCPCS | Performed by: ORTHOPAEDIC SURGERY

## 2025-03-28 PROCEDURE — 85027 COMPLETE CBC AUTOMATED: CPT

## 2025-03-28 PROCEDURE — 80048 BASIC METABOLIC PNL TOTAL CA: CPT

## 2025-03-28 PROCEDURE — 51701 INSERT BLADDER CATHETER: CPT

## 2025-03-28 PROCEDURE — 36415 COLL VENOUS BLD VENIPUNCTURE: CPT

## 2025-03-28 PROCEDURE — 0QS606Z REPOSITION RIGHT UPPER FEMUR WITH INTRAMEDULLARY INTERNAL FIXATION DEVICE, OPEN APPROACH: ICD-10-PCS

## 2025-03-28 PROCEDURE — G0480 DRUG TEST DEF 1-7 CLASSES: HCPCS

## 2025-03-28 PROCEDURE — 3700000001 HC ADD 15 MINUTES (ANESTHESIA): Performed by: ORTHOPAEDIC SURGERY

## 2025-03-28 PROCEDURE — 7100000000 HC PACU RECOVERY - FIRST 15 MIN: Performed by: ORTHOPAEDIC SURGERY

## 2025-03-28 PROCEDURE — 86900 BLOOD TYPING SEROLOGIC ABO: CPT

## 2025-03-28 PROCEDURE — 2000000000 HC ICU R&B

## 2025-03-28 PROCEDURE — 30233N1 TRANSFUSION OF NONAUTOLOGOUS RED BLOOD CELLS INTO PERIPHERAL VEIN, PERCUTANEOUS APPROACH: ICD-10-PCS

## 2025-03-28 PROCEDURE — 51798 US URINE CAPACITY MEASURE: CPT

## 2025-03-28 PROCEDURE — 2500000003 HC RX 250 WO HCPCS

## 2025-03-28 PROCEDURE — 80307 DRUG TEST PRSMV CHEM ANLYZR: CPT

## 2025-03-28 PROCEDURE — 2500000003 HC RX 250 WO HCPCS: Performed by: STUDENT IN AN ORGANIZED HEALTH CARE EDUCATION/TRAINING PROGRAM

## 2025-03-28 PROCEDURE — 82607 VITAMIN B-12: CPT

## 2025-03-28 PROCEDURE — 86923 COMPATIBILITY TEST ELECTRIC: CPT

## 2025-03-28 PROCEDURE — 84439 ASSAY OF FREE THYROXINE: CPT

## 2025-03-28 PROCEDURE — 93010 ELECTROCARDIOGRAM REPORT: CPT | Performed by: INTERNAL MEDICINE

## 2025-03-28 PROCEDURE — C1713 ANCHOR/SCREW BN/BN,TIS/BN: HCPCS | Performed by: ORTHOPAEDIC SURGERY

## 2025-03-28 PROCEDURE — 73502 X-RAY EXAM HIP UNI 2-3 VIEWS: CPT

## 2025-03-28 PROCEDURE — 2500000003 HC RX 250 WO HCPCS: Performed by: ORTHOPAEDIC SURGERY

## 2025-03-28 PROCEDURE — 6360000002 HC RX W HCPCS: Performed by: ANESTHESIOLOGY

## 2025-03-28 PROCEDURE — 82746 ASSAY OF FOLIC ACID SERUM: CPT

## 2025-03-28 PROCEDURE — 82947 ASSAY GLUCOSE BLOOD QUANT: CPT

## 2025-03-28 PROCEDURE — 86901 BLOOD TYPING SEROLOGIC RH(D): CPT

## 2025-03-28 PROCEDURE — 6360000002 HC RX W HCPCS: Performed by: ORTHOPAEDIC SURGERY

## 2025-03-28 PROCEDURE — 82040 ASSAY OF SERUM ALBUMIN: CPT

## 2025-03-28 PROCEDURE — P9045 ALBUMIN (HUMAN), 5%, 250 ML: HCPCS | Performed by: STUDENT IN AN ORGANIZED HEALTH CARE EDUCATION/TRAINING PROGRAM

## 2025-03-28 PROCEDURE — 86850 RBC ANTIBODY SCREEN: CPT

## 2025-03-28 PROCEDURE — 73552 X-RAY EXAM OF FEMUR 2/>: CPT

## 2025-03-28 PROCEDURE — 99231 SBSQ HOSP IP/OBS SF/LOW 25: CPT | Performed by: SURGERY

## 2025-03-28 PROCEDURE — 85025 COMPLETE CBC W/AUTO DIFF WBC: CPT

## 2025-03-28 PROCEDURE — 83036 HEMOGLOBIN GLYCOSYLATED A1C: CPT

## 2025-03-28 PROCEDURE — 84443 ASSAY THYROID STIM HORMONE: CPT

## 2025-03-28 PROCEDURE — 2720000010 HC SURG SUPPLY STERILE: Performed by: ORTHOPAEDIC SURGERY

## 2025-03-28 PROCEDURE — P9016 RBC LEUKOCYTES REDUCED: HCPCS

## 2025-03-28 PROCEDURE — 3600000004 HC SURGERY LEVEL 4 BASE: Performed by: ORTHOPAEDIC SURGERY

## 2025-03-28 PROCEDURE — 2709999900 HC NON-CHARGEABLE SUPPLY: Performed by: ORTHOPAEDIC SURGERY

## 2025-03-28 PROCEDURE — 3600000014 HC SURGERY LEVEL 4 ADDTL 15MIN: Performed by: ORTHOPAEDIC SURGERY

## 2025-03-28 PROCEDURE — 81001 URINALYSIS AUTO W/SCOPE: CPT

## 2025-03-28 PROCEDURE — 3700000000 HC ANESTHESIA ATTENDED CARE: Performed by: ORTHOPAEDIC SURGERY

## 2025-03-28 PROCEDURE — 6360000002 HC RX W HCPCS: Performed by: STUDENT IN AN ORGANIZED HEALTH CARE EDUCATION/TRAINING PROGRAM

## 2025-03-28 DEVICE — LOCKING SCREW FOR IM NAIL Ø 5.0MM / L 44MM / XL25/ STER: Type: IMPLANTABLE DEVICE | Status: FUNCTIONAL

## 2025-03-28 DEVICE — LOCKING SCREW FOR IM NAIL Ø 5.0MM / L 38MM / XL25/ STER: Type: IMPLANTABLE DEVICE | Status: FUNCTIONAL

## 2025-03-28 DEVICE — TFNA FENESTRATED SCREW 85MM - STERILE
Type: IMPLANTABLE DEVICE | Status: FUNCTIONAL
Brand: TFN-ADVANCE

## 2025-03-28 DEVICE — IMPLANTABLE DEVICE: Type: IMPLANTABLE DEVICE | Status: FUNCTIONAL

## 2025-03-28 RX ORDER — DROPERIDOL 2.5 MG/ML
0.62 INJECTION, SOLUTION INTRAMUSCULAR; INTRAVENOUS
Status: DISCONTINUED | OUTPATIENT
Start: 2025-03-28 | End: 2025-03-28 | Stop reason: HOSPADM

## 2025-03-28 RX ORDER — FENTANYL CITRATE 50 UG/ML
INJECTION, SOLUTION INTRAMUSCULAR; INTRAVENOUS
Status: DISCONTINUED | OUTPATIENT
Start: 2025-03-28 | End: 2025-03-28 | Stop reason: SDUPTHER

## 2025-03-28 RX ORDER — SODIUM CHLORIDE 0.9 % (FLUSH) 0.9 %
5-40 SYRINGE (ML) INJECTION PRN
Status: DISCONTINUED | OUTPATIENT
Start: 2025-03-28 | End: 2025-03-28 | Stop reason: HOSPADM

## 2025-03-28 RX ORDER — ALBUMIN HUMAN 50 G/1000ML
SOLUTION INTRAVENOUS
Status: DISCONTINUED | OUTPATIENT
Start: 2025-03-28 | End: 2025-03-28 | Stop reason: SDUPTHER

## 2025-03-28 RX ORDER — HYDRALAZINE HYDROCHLORIDE 20 MG/ML
10 INJECTION INTRAMUSCULAR; INTRAVENOUS
Status: DISCONTINUED | OUTPATIENT
Start: 2025-03-28 | End: 2025-03-28 | Stop reason: HOSPADM

## 2025-03-28 RX ORDER — VANCOMYCIN HYDROCHLORIDE 1 G/20ML
INJECTION, POWDER, LYOPHILIZED, FOR SOLUTION INTRAVENOUS PRN
Status: DISCONTINUED | OUTPATIENT
Start: 2025-03-28 | End: 2025-03-28 | Stop reason: ALTCHOICE

## 2025-03-28 RX ORDER — PROPOFOL 10 MG/ML
INJECTION, EMULSION INTRAVENOUS
Status: DISCONTINUED | OUTPATIENT
Start: 2025-03-28 | End: 2025-03-28 | Stop reason: SDUPTHER

## 2025-03-28 RX ORDER — SODIUM CHLORIDE 9 MG/ML
INJECTION, SOLUTION INTRAVENOUS PRN
Status: DISCONTINUED | OUTPATIENT
Start: 2025-03-28 | End: 2025-03-28 | Stop reason: HOSPADM

## 2025-03-28 RX ORDER — CALCIUM CHLORIDE 100 MG/ML
INJECTION INTRAVENOUS; INTRAVENTRICULAR
Status: DISCONTINUED | OUTPATIENT
Start: 2025-03-28 | End: 2025-03-28 | Stop reason: SDUPTHER

## 2025-03-28 RX ORDER — EPHEDRINE SULFATE/0.9% NACL/PF 25 MG/5 ML
SYRINGE (ML) INTRAVENOUS
Status: DISCONTINUED | OUTPATIENT
Start: 2025-03-28 | End: 2025-03-28 | Stop reason: SDUPTHER

## 2025-03-28 RX ORDER — MAGNESIUM HYDROXIDE 1200 MG/15ML
LIQUID ORAL CONTINUOUS PRN
Status: COMPLETED | OUTPATIENT
Start: 2025-03-28 | End: 2025-03-28

## 2025-03-28 RX ORDER — SODIUM CHLORIDE 0.9 % (FLUSH) 0.9 %
5-40 SYRINGE (ML) INJECTION EVERY 12 HOURS SCHEDULED
Status: DISCONTINUED | OUTPATIENT
Start: 2025-03-28 | End: 2025-03-28 | Stop reason: HOSPADM

## 2025-03-28 RX ORDER — ROCURONIUM BROMIDE 10 MG/ML
INJECTION, SOLUTION INTRAVENOUS
Status: DISCONTINUED | OUTPATIENT
Start: 2025-03-28 | End: 2025-03-28 | Stop reason: SDUPTHER

## 2025-03-28 RX ORDER — BUPIVACAINE HYDROCHLORIDE 2.5 MG/ML
INJECTION, SOLUTION EPIDURAL; INFILTRATION; INTRACAUDAL; PERINEURAL
Status: COMPLETED | OUTPATIENT
Start: 2025-03-28 | End: 2025-03-28

## 2025-03-28 RX ORDER — CEFAZOLIN SODIUM 1 G/3ML
INJECTION, POWDER, FOR SOLUTION INTRAMUSCULAR; INTRAVENOUS
Status: DISCONTINUED | OUTPATIENT
Start: 2025-03-28 | End: 2025-03-28 | Stop reason: SDUPTHER

## 2025-03-28 RX ORDER — SODIUM CHLORIDE, SODIUM LACTATE, POTASSIUM CHLORIDE, CALCIUM CHLORIDE 600; 310; 30; 20 MG/100ML; MG/100ML; MG/100ML; MG/100ML
INJECTION, SOLUTION INTRAVENOUS
Status: DISCONTINUED | OUTPATIENT
Start: 2025-03-28 | End: 2025-03-28 | Stop reason: SDUPTHER

## 2025-03-28 RX ORDER — NALOXONE HYDROCHLORIDE 0.4 MG/ML
INJECTION, SOLUTION INTRAMUSCULAR; INTRAVENOUS; SUBCUTANEOUS PRN
Status: DISCONTINUED | OUTPATIENT
Start: 2025-03-28 | End: 2025-03-28 | Stop reason: HOSPADM

## 2025-03-28 RX ORDER — PHENYLEPHRINE HCL IN 0.9% NACL 1 MG/10 ML
SYRINGE (ML) INTRAVENOUS
Status: DISCONTINUED | OUTPATIENT
Start: 2025-03-28 | End: 2025-03-28 | Stop reason: SDUPTHER

## 2025-03-28 RX ORDER — LIDOCAINE HYDROCHLORIDE 10 MG/ML
INJECTION, SOLUTION EPIDURAL; INFILTRATION; INTRACAUDAL; PERINEURAL
Status: DISCONTINUED | OUTPATIENT
Start: 2025-03-28 | End: 2025-03-28 | Stop reason: SDUPTHER

## 2025-03-28 RX ORDER — METOCLOPRAMIDE HYDROCHLORIDE 5 MG/ML
10 INJECTION INTRAMUSCULAR; INTRAVENOUS
Status: DISCONTINUED | OUTPATIENT
Start: 2025-03-28 | End: 2025-03-28 | Stop reason: HOSPADM

## 2025-03-28 RX ORDER — DIPHENHYDRAMINE HYDROCHLORIDE 50 MG/ML
12.5 INJECTION, SOLUTION INTRAMUSCULAR; INTRAVENOUS
Status: DISCONTINUED | OUTPATIENT
Start: 2025-03-28 | End: 2025-03-28 | Stop reason: HOSPADM

## 2025-03-28 RX ADMIN — SODIUM CHLORIDE, PRESERVATIVE FREE 10 ML: 5 INJECTION INTRAVENOUS at 20:39

## 2025-03-28 RX ADMIN — MEMANTINE 10 MG: 5 TABLET ORAL at 08:33

## 2025-03-28 RX ADMIN — ATORVASTATIN CALCIUM 10 MG: 20 TABLET, FILM COATED ORAL at 08:34

## 2025-03-28 RX ADMIN — CARVEDILOL 25 MG: 25 TABLET, FILM COATED ORAL at 08:34

## 2025-03-28 RX ADMIN — PROPOFOL 100 MG: 10 INJECTION, EMULSION INTRAVENOUS at 14:23

## 2025-03-28 RX ADMIN — HEPARIN SODIUM 5000 UNITS: 5000 INJECTION INTRAVENOUS; SUBCUTANEOUS at 22:12

## 2025-03-28 RX ADMIN — ALBUMIN (HUMAN) 12.5 G: 2.5 SOLUTION INTRAVENOUS at 14:54

## 2025-03-28 RX ADMIN — SODIUM CHLORIDE, POTASSIUM CHLORIDE, SODIUM LACTATE AND CALCIUM CHLORIDE: 600; 310; 30; 20 INJECTION, SOLUTION INTRAVENOUS at 15:10

## 2025-03-28 RX ADMIN — CALCIUM CHLORIDE 1 G: 100 INJECTION INTRAVENOUS; INTRAVENTRICULAR at 15:06

## 2025-03-28 RX ADMIN — HEPARIN SODIUM 5000 UNITS: 5000 INJECTION INTRAVENOUS; SUBCUTANEOUS at 06:16

## 2025-03-28 RX ADMIN — ROCURONIUM BROMIDE 20 MG: 50 INJECTION INTRAVENOUS at 15:10

## 2025-03-28 RX ADMIN — CLONIDINE HYDROCHLORIDE 0.1 MG: 0.1 TABLET ORAL at 20:25

## 2025-03-28 RX ADMIN — Medication 2000 UNITS: at 08:33

## 2025-03-28 RX ADMIN — Medication 200 MCG: at 14:45

## 2025-03-28 RX ADMIN — LIDOCAINE HYDROCHLORIDE 50 MG: 10 INJECTION, SOLUTION EPIDURAL; INFILTRATION; INTRACAUDAL; PERINEURAL at 14:23

## 2025-03-28 RX ADMIN — Medication 400 MG: at 08:34

## 2025-03-28 RX ADMIN — ROCURONIUM BROMIDE 30 MG: 50 INJECTION INTRAVENOUS at 15:39

## 2025-03-28 RX ADMIN — SODIUM CHLORIDE, SODIUM LACTATE, POTASSIUM CHLORIDE, CALCIUM CHLORIDE: 600; 310; 30; 20 INJECTION, SOLUTION INTRAVENOUS at 14:21

## 2025-03-28 RX ADMIN — SUGAMMADEX 200 MG: 100 INJECTION, SOLUTION INTRAVENOUS at 17:01

## 2025-03-28 RX ADMIN — CEFAZOLIN 2 G: 1 INJECTION, POWDER, FOR SOLUTION INTRAMUSCULAR; INTRAVENOUS at 14:36

## 2025-03-28 RX ADMIN — Medication 100 MCG: at 15:58

## 2025-03-28 RX ADMIN — Medication 100 MCG: at 14:34

## 2025-03-28 RX ADMIN — EPHEDRINE SULFATE 5 MG: 5 INJECTION INTRAVENOUS at 16:46

## 2025-03-28 RX ADMIN — SODIUM CHLORIDE, PRESERVATIVE FREE 10 ML: 5 INJECTION INTRAVENOUS at 08:37

## 2025-03-28 RX ADMIN — FINASTERIDE 5 MG: 5 TABLET, FILM COATED ORAL at 08:34

## 2025-03-28 RX ADMIN — Medication 100 MCG: at 14:43

## 2025-03-28 RX ADMIN — BUPIVACAINE HYDROCHLORIDE 30 ML: 2.5 INJECTION, SOLUTION EPIDURAL; INFILTRATION; INTRACAUDAL; PERINEURAL at 13:54

## 2025-03-28 RX ADMIN — CLONIDINE HYDROCHLORIDE 0.1 MG: 0.1 TABLET ORAL at 08:34

## 2025-03-28 RX ADMIN — FENTANYL CITRATE 25 MCG: 50 INJECTION, SOLUTION INTRAMUSCULAR; INTRAVENOUS at 17:10

## 2025-03-28 RX ADMIN — CYANOCOBALAMIN TAB 500 MCG 1000 MCG: 500 TAB at 08:34

## 2025-03-28 RX ADMIN — OXYCODONE HYDROCHLORIDE AND ACETAMINOPHEN 500 MG: 500 TABLET ORAL at 08:34

## 2025-03-28 RX ADMIN — AMLODIPINE BESYLATE 2.5 MG: 5 TABLET ORAL at 08:33

## 2025-03-28 RX ADMIN — INSULIN LISPRO 4 UNITS: 100 INJECTION, SOLUTION INTRAVENOUS; SUBCUTANEOUS at 22:12

## 2025-03-28 RX ADMIN — FENTANYL CITRATE 50 MCG: 50 INJECTION, SOLUTION INTRAMUSCULAR; INTRAVENOUS at 14:52

## 2025-03-28 RX ADMIN — EPHEDRINE SULFATE 10 MG: 5 INJECTION INTRAVENOUS at 14:47

## 2025-03-28 RX ADMIN — EPHEDRINE SULFATE 10 MG: 5 INJECTION INTRAVENOUS at 15:58

## 2025-03-28 RX ADMIN — FENTANYL CITRATE 25 MCG: 50 INJECTION, SOLUTION INTRAMUSCULAR; INTRAVENOUS at 17:01

## 2025-03-28 RX ADMIN — FERROUS SULFATE TAB EC 325 MG (65 MG FE EQUIVALENT) 325 MG: 325 (65 FE) TABLET DELAYED RESPONSE at 08:33

## 2025-03-28 RX ADMIN — ROCURONIUM BROMIDE 50 MG: 50 INJECTION INTRAVENOUS at 14:23

## 2025-03-28 RX ADMIN — Medication 2000 MG: at 22:38

## 2025-03-28 RX ADMIN — Medication 200 MCG: at 15:10

## 2025-03-28 RX ADMIN — MEMANTINE 10 MG: 5 TABLET ORAL at 20:25

## 2025-03-28 ASSESSMENT — ENCOUNTER SYMPTOMS: SHORTNESS OF BREATH: 0

## 2025-03-28 ASSESSMENT — PAIN - FUNCTIONAL ASSESSMENT: PAIN_FUNCTIONAL_ASSESSMENT: NONE - DENIES PAIN

## 2025-03-28 ASSESSMENT — LIFESTYLE VARIABLES: SMOKING_STATUS: 0

## 2025-03-28 NOTE — CARE COORDINATION
SBIRT-  Met with pt this a.m. was awake,alert and very [place]  Pt states he is having surgery today.  Pt  was unable to answer appropriately, kept saying he is going to surgery  SBIRT- deferred due to h/o dementia              Alcohol Screening and Brief Intervention    I have not interviewed ChristianKATTY Smart, [id number] regarding  His alcohol consumption/drug use and risk for excessive use.     Deferred [x]    Completed on: 3/28/2025   VIOLETA SEAMAN

## 2025-03-28 NOTE — DISCHARGE INSTRUCTIONS
Orthopaedic Instructions:  -Weight bearing status: Weight bearing as tolerated with the right leg  -Do not remove Optifoam bandage (the large sealed \"Band-aid\"-like dressing) until your follow-up date in clinic. It is okay to shower with the Optifoam bandage. Should it fall off, replace with band-aids or gauze & tape until dry. It is still okay to shower if it falls off, but avoid baths and underwater submersion.  -Always look for signs of compartment syndrome: pain out of proportion to the injury, pain not controlled with pain medication, numbness in digits, changing of color of digits (paleness). If these signs occur return to ED immediately for reassessment.  -Always work on ankle motion to decrease swelling.  -Ice (20 minutes on and off 1 hour) to reduce swelling and throbbing pain.  -Should urinate within 8 hours of surgery.  -Call the office or come to Emergency Room if signs of infection appear (hot, swollen, red, draining pus, fever)  -Take medications as prescribed.  -Wean off narcotics (percocet/norco) as soon as possible. Do not take tylenol if still taking narcotics.  -Follow up with Dr. Madsen in his office on 4/15/25 at 10:30 AM. Call [phone] to schedule/confirm or with any questions/concerns.          Urology follow up:  -MAINTAIN FARLEY CATHETER AT DISCHARGE  -PLEASE ATTEMPT REPEAT VOID TRIAL IN ~2 WEEKS   VOID TRIAL ORDERS:    -Remove farley early in the AM on 4/17/25    -Encourage patient to void about 4 hours after farley removal (or when patient feels urge to void).     -Measure urine output AND post-void residual with bladder scan (or straight cath if scanner not available) at least TWO times.     -If patient able to void more than 200mL and more than 50% of his total bladder volume, do not replaced catheter. Encourage double and timed voiding.    -If patient unable to void or retaining more than 50% of total bladder volume (ie - void 100, ), then replace indwelling catheter and

## 2025-03-28 NOTE — ANESTHESIA PROCEDURE NOTES
Airway  Date/Time: 3/28/2025 2:29 PM  Urgency: elective    Airway not difficult    General Information and Staff    Patient location during procedure: OR  Anesthesiologist: Alejandro Daugherty MD  Resident/CRNA: Elisabeth Malloy MD  Performed: resident/CRNA/CAA and anesthesiologist   Performed by: Elisabeth Malloy MD  Authorized by: Alejandro Daugherty MD      Indications and Patient Condition  Indications for airway management: anesthesia  Spontaneous ventilation: present  Sedation level: no sedation  Preoxygenated: yes  Patient position: sniffing  Mask difficulty assessment: vent by bag mask    Final Airway Details  Final airway type: endotracheal airway      Successful airway: ETT  Cuffed: yes   Successful intubation technique: direct laryngoscopy  Facilitating devices/methods: Bougie, intubating stylet and cricoid pressure  Endotracheal tube insertion site: oral  Blade: Ad  Blade size: #4  ETT size (mm): 7.5  Cormack-Lehane Classification: grade IIb - view of arytenoids or posterior of glottis only  Placement verified by: chest auscultation and capnometry   Measured from: lips  ETT to lips (cm): 23  Number of attempts at approach: 1  Ventilation between attempts: bag mask  Number of other approaches attempted: 1    Other Attempts  Unsuccessful attempted endotracheal techniques: direct laryngoscopy    Additional Comments  Attempted DL x3, first two attempts resident, third attempt attending with grade 2b view bougie used for placement of ETT. Pt remained stable throughout.  no

## 2025-03-28 NOTE — ANESTHESIA PRE PROCEDURE
Department of Anesthesiology  Preprocedure Note       Name:  Brandon Smart   Age:  84 y.o.  :  1940                                          MRN:  [id number]         Date:  3/28/2025      Surgeon: Surgeon(s):  Mayo Madsen DO    Procedure: Procedure(s):  FEMUR CEPHALOMEDULLARY NAIL (SUP, FLT JXN, HENRY, SYNTHES TFNA SET, STERILE TRACTION AVAIL)    Medications prior to admission:   Prior to Admission medications    Medication Sig Start Date End Date Taking? Authorizing Provider   Multiple Vitamin (MULTIVITAMIN) tablet  3/12/25  Yes Steffi Marquez MD   magnesium oxide (MAG-OX) 400 (240 Mg) MG tablet  3/12/25  Yes Steffi Marquez MD   CALCIUM 600/VITAMIN D3 600-20 MG-MCG TABS  3/12/25  Yes Steffi Marquez MD   atorvastatin (LIPITOR) 10 MG tablet  3/17/25  Yes Steffi Marquez MD   JANUVIA 100 MG tablet Take 1 tablet by mouth daily    Steffi Marquez MD   magnesium oxide (MAG-OX) 400 MG tablet Take 1 tablet by mouth 2 times daily    Steffi Marquez MD   vitamin B-12 (CYANOCOBALAMIN) 1000 MCG tablet Take 1 tablet by mouth daily    Steffi Marquez MD   ascorbic acid (VITAMIN C) 500 MG tablet Take 1 tablet by mouth daily    Steffi Marquez MD   amLODIPine (NORVASC) 2.5 MG tablet Take 1 tablet by mouth daily 3/23/22   Salvatore Haywood MD   ferrous sulfate (FE TABS 325) 325 (65 Fe) MG EC tablet Take 1 tablet by mouth daily (with breakfast) 3/23/22   Salvatore Haywood MD   metFORMIN (GLUCOPHAGE) 500 MG tablet Take 1 tablet by mouth daily (with breakfast) 3/22/22   Salvatore Haywood MD   donepezil (ARICEPT) 10 MG tablet Take 10 mg by mouth nightly    Steffi Marquez MD   memantine (NAMENDA) 10 MG tablet Take 10 mg by mouth 2 times daily    Steffi Marquez MD   aspirin 81 MG tablet Take 81 mg by mouth daily.    Steffi Marquez MD   carvedilol (COREG) 25 MG tablet Take 25 mg by mouth 2 times daily (with meals).    Steffi Marquez MD   cloNIDine

## 2025-03-28 NOTE — BRIEF OP NOTE
Brief Postoperative Note      Patient: Brandon Smart  YOB: 1940  MRN: [id number]    Date of Procedure: 3/28/2025    Pre-Op Diagnosis:   Right intertrochanteric femur fracture     Post-Op Diagnosis:   Right intertrochanteric femur fracture        Procedure(s):  Cephalomedullary nail placement in right femur       Surgeon(s):  Mayo Madsen DO    Assistant:  Resident: Bandar Jordan DO; Surjit Flores DO    Anesthesia: General    Estimated Blood Loss (mL): 300     Fluids (mL): 600 crystalloids, 250 albumin, 2u PRBCs     Complications: None    Specimens:   * No specimens in log *    Implants:  Implant Name Type Inv. Item Serial No.  Lot No. LRB No. Used Action   NAIL IM L420MM [id number] 125DEG LNG R PROX FEM GRN TI APARNA - [id number]  NAIL IM L420MM [id number] 125DEG LNG R PROX FEM GRN TI APARNA  DEPUY [facility]ES [facility] [id number] Right 1 Implanted   SCREW BNE L85MM DIA10.35MM G TI APARNA PERF FOR PROX FEM - [id number]  SCREW BNE L85MM DIA10.35MM G TI APARNA PERF FOR PROX FEM  DEPUY SYNTHES [place]- [id number] Right 1 Implanted   SCREW BNE LCK 5X38 MM XL25 STRL TUBE RETROGRADE RFNADVANCED - [id number]  SCREW BNE LCK 5X38 MM XL25 STRL TUBE RETROGRADE RFNADVANCED  DEPUY SYNTHES [place]- [id number] Right 1 Implanted   SCREW BNE LCK 5X44 MM STRL TUBE FOR IM NAIL TI RFNADVANCED - [id number]  SCREW BNE LCK 5X44 MM STRL TUBE FOR IM NAIL TI RFNADVANCED  DEPUY SYNTHES [place]-WD [id number] Right 1 Implanted         Drains:   External Urinary Catheter (Active)   Site Assessment Clean,dry & intact 03/28/25 1135   Placement Initiated 03/28/25 0400   Securement Method Securing device (Describe) 03/28/25 1135   Catheter Care Suction Canister/Tubing changed 03/28/25 1135   Perineal Care Yes 03/28/25 0400   Suction 40 mmgHg continuous 03/28/25 1135       Findings:  Infection Present At Time Of Surgery (PATOS) (choose all levels that have infection present):  No infection present  Other Findings: Right intertrochanteric femur fracture

## 2025-03-28 NOTE — CARE COORDINATION
Trauma Coordinator Rounding Note  Daily check in:  I attempted to meet with Brandon Smart  at bedside to review their plan of care.Pt in OR at time of visit. I reviewed his chart for the following info. Per CM request PM&R consult ordered.    [x]Wounds / Injuries  [x]PT/OT/speech  []Incentive spirometer  []Diet  []Activity  [x]Preferred pharmacy  [x] PCP Confirmed  [x] Medical Insurance Confirmed  [] Work-related Injury  [] Pain Management Education    DC Expectation:  Patient expects to be discharged to  unknown at this time, will depend on pt progress.   Discharge Info:  I confirmed follow up information was placed in the discharge instructions for Cardiology and Orthopedics.   Discharge instructions reviewed and updated in patient's chart.   :  I provided a clinical update to the unit  and confirmed the disposition plan. Current barriers to discharge include: not medically stable for discharge due to Planned Surgical procedure  Electronically signed by Julieta Traylor RN on 3/28/2025 at 9:26 AM

## 2025-03-28 NOTE — OP NOTE
I was present for critical portions of this procedure. The patient underwent CMN of R IT fx with no intraoperative or immediate postoperative complications. Any necessary corrections to the dictation were made. I agree with the operative report as listed below.     Mayo Madsen DO   04/04/25 12:42 PM    Operative Note      Patient: Brandon Smart  YOB: 1940  MRN: [id number]    Date of Procedure: 3/28/2025    Pre-Op Diagnosis:   Right intertrochanteric femur fracture      Post-Op Diagnosis:   Right intertrochanteric femur fracture        Procedure(s):  Cephalomedullary nail placement in right femur      Surgeon(s):  Mayo Madsen DO     Assistant:  Resident: Bandar Jordan DO; Surjit Flores DO     Anesthesia: General     Estimated Blood Loss (mL): 300      Fluids (mL): 600 crystalloids, 250 albumin, 2u PRBCs      Complications: None    Specimens:   * No specimens in log *    Implants:  Synthes 10 x 420 mm, 125 degree TFNA with 85 mm lag screw      Indications:    This is a 84 y.o. male who sustained a right intertrochanteric femur fracture. We discussed the nature of the injury as well as the indications for surgical intervention as well as the associated risks, benefits, and alternatives of the procedure. The patient stated clear understanding, was willing to proceed, provided written informed consent, and had his operative site marked. The patient received appropriate preoperative clearance/risk stratification from the primary and/or consulting services.     Procedure:  The patient was transported to the operating room and general anesthesia was administered by the anesthesia providers without complication. The patient was then transferred to a radiolucent flattop table in a supine position. All bony prominences were well padded and the patient was adequately secured to the bed. The ipsilateral upper extremity was secured over the chest and well padded. The right lower extremity was isolated,

## 2025-03-28 NOTE — ED NOTES
Brandon Smart, 84-year-old male fall with right hip fracture.  On Eliquis.  CT scan head C-spine T-spine L-spine negative.  Patient also noted to have pneumonia already given Levaquin.  
Called lab to add on ethanol   
ED to inpatient nurses report      Chief Complaint:  Chief Complaint   Patient presents with    Hip Injury     Right; [facility] transfer     Present to ED from: [facility]    MOA:     LOC: alert and orientated to name and place and situation   Mobility: Requires assistance * 2; right hip fracture  Oxygen Baseline: 2 L NC    Current needs required: 2L NC   Pending ED orders: admission orders  Present condition: right hip fracture, pneumonia    Why did the patient come to the ED? Pt presents to ED via EMS on stretcher as [facility] transfer d/t right hip fracture. Pt also presenting with pneumonia. Upon arrival, pt reports falling in kitchen and was down for approximately for 40 minutes. Pt is poor historian and has hx of dementia. Per report, pt takes eliquis. Pt is alert and oriented to name, situation and place. Pt is disoriented to year/time.   Pt placed on full cardiac monitor, EKG completed, IV access established. Labs drawn.  Call light within reach.   What is the plan? Surgery   Any procedures or intervention occur? Labs, imaging   Any safety concerns?? Fall risk    Mental Status:       Psych Assessment:   Psychosocial  Psychosocial (WDL): Within Defined Limits  Vital signs   Vitals:    03/27/25 1828 03/27/25 1956 03/27/25 2116 03/27/25 2345   BP: (!) 142/71 (!) 156/119 (!) 149/108 (!) 169/79   Pulse: 85 87 84 80   Resp: 18 25  17   Temp:       TempSrc:       SpO2: 97% 99%  96%        Vitals:  Patient Vitals for the past 24 hrs:   BP Temp Temp src Pulse Resp SpO2   03/27/25 2345 (!) 169/79 -- -- 80 17 96 %   03/27/25 2116 (!) 149/108 -- -- 84 -- --   03/27/25 1956 (!) 156/119 -- -- 87 25 99 %   03/27/25 1828 (!) 142/71 -- -- 85 18 97 %   03/27/25 1800 136/84 -- -- 77 12 91 %   03/27/25 1716 132/88 -- -- 83 14 92 %   03/27/25 1648 (!) 157/79 -- -- 96 12 99 %   03/27/25 1633 (!) 149/68 -- -- 70 15 99 %   03/27/25 1614 (!) 144/90 -- -- 81 11 100 %   03/27/25 1604 (!) 160/95 -- -- 87 18 100 %   03/27/25 1545 -- 
Ortho surgery at bedside and obtaining consent via phone call with POA/daughter. Writer and Harvinder BLANTON signed as witness for consent.   
Pt continuously removing self from NC, pt placed back on nasal cannula by nursing staff.   
Pt has zero urine output since arrival, 373 mL via bladder scanner. Admitting team notified. Pt still on external cath.  No new orders at this time.   
Pt presents to ED via EMS on stretcher as [facility] d/t right hip fracture. Pt also presenting with pneumonia. Upon arrival, pt reports falling in kitchen and was down for approximately for 40 minutes. Pt is poor historian and has hx of dementia. Per report, pt takes eliquis. Pt is alert and oriented to name, situation and place. Pt is disoriented to year/time.   Pt placed on full cardiac monitor, EKG completed, IV access established. Labs drawn.  Call light within reach.   
The following labs were labeled with appropriate pt sticker and tubed to lab:     [x] Blue     [x] Lavender   [] on ice  [x] Green/yellow  [x] Green/black [] on ice  [] Grey  [] on ice  [] Yellow  [] Red  [] Pink  [] Type/ Screen  [] ABG  [] VBG    [] COVID-19 swab    [] Rapid  [] PCR  [] Flu swab  [] Peds Viral Panel     [] Urine Sample  [] Fecal Sample  [] Pelvic Cultures  [] Blood Cultures  [] X 2  [] STREP Cultures  [] Wound Cultures    
Writer entered room to place pt back on continuous pulse ox. Pt removed both IV's and was removing self from telemetry. Pt also becoming more agitated and yelled to have this wife come and get him.   1:1 sitter obtained and at bedside.   
no...

## 2025-03-28 NOTE — CARE COORDINATION
Case Management Assessment  Initial Evaluation    Date/Time of Evaluation: 3/28/2025 10:35 AM  Assessment Completed by: Gabbi Chakraborty    If patient is discharged prior to next notation, then this note serves as note for discharge by case management.    Patient Name: Brandon Smart                   YOB: 1940  Diagnosis: Closed comminuted intertrochanteric fracture of proximal femur, right, initial encounter [S72.141A]  Closed comminuted intertrochanteric fracture of proximal end of right femur, initial encounter [S72.141A]  Pneumonia of right lower lobe due to infectious organism [J18.9]                   Date / Time: 3/27/2025  3:32 PM    Patient Admission Status: Inpatient   Readmission Risk (Low < 19, Mod (19-27), High > 27): Readmission Risk Score: 15.4    Current PCP: Ruth Ann Richmond APRN - CNP  PCP verified by CM? Yes ([facility])    Chart Reviewed: Yes      History Provided by: Patient  Patient Orientation:      Patient Cognition: Alert    Hospitalization in the last 30 days (Readmission):  No    If yes, Readmission Assessment in  Navigator will be completed.    Advance Directives:      Code Status: DNR-CCA   Patient's Primary Decision Maker is: Legal Next of Kin      Discharge Planning:    Patient lives with: Spouse/Significant Other Type of Home: Assisted living, Other (Comment) (Wife lives w/ patient at AL)  Primary Care Giver: Self  Patient Support Systems include: Spouse/Significant Other, Children, Other (Comment) (Staff of [facility])   Current Financial resources: Medicare  Current community resources: Assisted Living  Current services prior to admission: Other (Comment) (lives at [facility])            Current DME:              Type of Home Care services:  OT, PT, Skilled Therapy, Aide Services, Nursing Services    ADLS  Prior functional level: Assistance with the following:, Bathing, Dressing, Toileting, Cooking, Housework, Shopping, Mobility, Other (see

## 2025-03-28 NOTE — ANESTHESIA PROCEDURE NOTES
Peripheral Block    Patient location during procedure: pre-op  Reason for block: procedure for pain, post-op pain management and at surgeon's request  Start time: 3/28/2025 1:50 PM  End time: 3/28/2025 1:52 PM  Staffing  Performed: anesthesiologist   Anesthesiologist: Darci Lundy MD  Performed by: Sina Lee MD  Authorized by: Maria Isabel Landaverde MD    Preanesthetic Checklist  Completed: patient identified, IV checked, site marked, risks and benefits discussed, surgical/procedural consents, equipment checked, pre-op evaluation, timeout performed, anesthesia consent given, oxygen available, monitors applied/VS acknowledged, fire risk safety assessment completed and verbalized and blood product R/B/A discussed and consented  Peripheral Block   Patient position: supine  Prep: ChloraPrep  Provider prep: mask and sterile gloves  Patient monitoring: cardiac monitor, continuous pulse ox, frequent blood pressure checks, IV access, oxygen and responsive to questions  Block type: Fascia iliaca  Laterality: right  Injection technique: single-shot  Guidance: ultrasound guided    Needle   Needle type: insulated echogenic nerve stimulator needle   Needle gauge: 22 G  Needle localization: ultrasound guidance  Needle length: 11 cm  Assessment   Injection assessment: negative aspiration for heme, no paresthesia on injection and local visualized surrounding nerve on ultrasound  Outcomes: patient tolerated procedure well and uncomplicated    Medications Administered  BUPivacaine (MARCAINE) PF injection 0.25% - Perineural   30 mL - 3/28/2025 1:54:00 PM

## 2025-03-28 NOTE — PLAN OF CARE
Problem: Safety - Adult  Goal: Free from fall injury  Outcome: Progressing     Problem: Chronic Conditions and Co-morbidities  Goal: Patient's chronic conditions and co-morbidity symptoms are monitored and maintained or improved  Outcome: Progressing     Problem: Discharge Planning  Goal: Discharge to home or other facility with appropriate resources  Outcome: Progressing     Problem: Skin/Tissue Integrity  Goal: Skin integrity remains intact  Description: 1.  Monitor for areas of redness and/or skin breakdown  2.  Assess vascular access sites hourly  3.  Every 4-6 hours minimum:  Change oxygen saturation probe site  4.  Every 4-6 hours:  If on nasal continuous positive airway pressure, respiratory therapy assess nares and determine need for appliance change or resting period  Outcome: Progressing     Problem: ABCDS Injury Assessment  Goal: Absence of physical injury  Outcome: Progressing     Problem: Confusion  Goal: Confusion, delirium, dementia, or psychosis is improved or at baseline  Description: INTERVENTIONS:  1. Assess for possible contributors to thought disturbance, including medications, impaired vision or hearing, underlying metabolic abnormalities, dehydration, psychiatric diagnoses, and notify attending LIP  2. [place] high risk fall precautions, as indicated  3. Provide frequent short contacts to provide reality reorientation, refocusing and direction  4. Decrease environmental stimuli, including noise as appropriate  5. Monitor and intervene to maintain adequate nutrition, hydration, elimination, sleep and activity  6. If unable to ensure safety without constant attention obtain sitter and review sitter guidelines with assigned personnel  7. Initiate Psychosocial CNS and Spiritual Care consult, as indicated  Outcome: Progressing

## 2025-03-29 LAB
ANION GAP SERPL CALCULATED.3IONS-SCNC: 14 MMOL/L (ref 9–16)
ANION GAP SERPL CALCULATED.3IONS-SCNC: 14 MMOL/L (ref 9–16)
BASOPHILS # BLD: <0.03 K/UL (ref 0–0.2)
BASOPHILS # BLD: <0.03 K/UL (ref 0–0.2)
BASOPHILS NFR BLD: 0 % (ref 0–2)
BASOPHILS NFR BLD: 0 % (ref 0–2)
BNP SERPL-MCNC: 8271 PG/ML (ref 0–450)
BUN SERPL-MCNC: 35 MG/DL (ref 8–23)
BUN SERPL-MCNC: 39 MG/DL (ref 8–23)
CA-I BLD-SCNC: 1.22 MMOL/L (ref 1.13–1.33)
CALCIUM SERPL-MCNC: 9 MG/DL (ref 8.6–10.4)
CALCIUM SERPL-MCNC: 9.5 MG/DL (ref 8.6–10.4)
CHLORIDE SERPL-SCNC: 105 MMOL/L (ref 98–107)
CHLORIDE SERPL-SCNC: 111 MMOL/L (ref 98–107)
CO2 SERPL-SCNC: 21 MMOL/L (ref 20–31)
CO2 SERPL-SCNC: 21 MMOL/L (ref 20–31)
CREAT SERPL-MCNC: 1.4 MG/DL (ref 0.7–1.2)
CREAT SERPL-MCNC: 1.5 MG/DL (ref 0.7–1.2)
EOSINOPHIL # BLD: 0.05 K/UL (ref 0–0.44)
EOSINOPHIL # BLD: <0.03 K/UL (ref 0–0.44)
EOSINOPHILS RELATIVE PERCENT: 0 % (ref 1–4)
EOSINOPHILS RELATIVE PERCENT: 1 % (ref 1–4)
ERYTHROCYTE [DISTWIDTH] IN BLOOD BY AUTOMATED COUNT: 13.2 % (ref 11.8–14.4)
ERYTHROCYTE [DISTWIDTH] IN BLOOD BY AUTOMATED COUNT: 13.2 % (ref 11.8–14.4)
GFR, ESTIMATED: 45 ML/MIN/1.73M2
GFR, ESTIMATED: 49 ML/MIN/1.73M2
GLUCOSE BLD-MCNC: 159 MG/DL (ref 75–110)
GLUCOSE BLD-MCNC: 200 MG/DL (ref 75–110)
GLUCOSE BLD-MCNC: 227 MG/DL (ref 75–110)
GLUCOSE SERPL-MCNC: 119 MG/DL (ref 74–99)
GLUCOSE SERPL-MCNC: 185 MG/DL (ref 74–99)
HCT VFR BLD AUTO: 20.7 % (ref 40.7–50.3)
HCT VFR BLD AUTO: 22.5 % (ref 40.7–50.3)
HCT VFR BLD AUTO: 25.6 % (ref 40.7–50.3)
HGB BLD-MCNC: 6.6 G/DL (ref 13–17)
HGB BLD-MCNC: 7.2 G/DL (ref 13–17)
HGB BLD-MCNC: 8 G/DL (ref 13–17)
IMM GRANULOCYTES # BLD AUTO: 0.06 K/UL (ref 0–0.3)
IMM GRANULOCYTES # BLD AUTO: 0.09 K/UL (ref 0–0.3)
IMM GRANULOCYTES NFR BLD: 1 %
IMM GRANULOCYTES NFR BLD: 1 %
LYMPHOCYTES NFR BLD: 0.75 K/UL (ref 1.1–3.7)
LYMPHOCYTES NFR BLD: 0.86 K/UL (ref 1.1–3.7)
LYMPHOCYTES RELATIVE PERCENT: 6 % (ref 24–43)
LYMPHOCYTES RELATIVE PERCENT: 8 % (ref 24–43)
MAGNESIUM SERPL-MCNC: 2.2 MG/DL (ref 1.6–2.4)
MAGNESIUM SERPL-MCNC: 2.3 MG/DL (ref 1.6–2.4)
MCH RBC QN AUTO: 27 PG (ref 25.2–33.5)
MCH RBC QN AUTO: 27.4 PG (ref 25.2–33.5)
MCHC RBC AUTO-ENTMCNC: 31.3 G/DL (ref 28.4–34.8)
MCHC RBC AUTO-ENTMCNC: 32 G/DL (ref 28.4–34.8)
MCV RBC AUTO: 85.6 FL (ref 82.6–102.9)
MCV RBC AUTO: 86.5 FL (ref 82.6–102.9)
MONOCYTES NFR BLD: 0.93 K/UL (ref 0.1–1.2)
MONOCYTES NFR BLD: 0.98 K/UL (ref 0.1–1.2)
MONOCYTES NFR BLD: 8 % (ref 3–12)
MONOCYTES NFR BLD: 9 % (ref 3–12)
NEUTROPHILS NFR BLD: 82 % (ref 36–65)
NEUTROPHILS NFR BLD: 85 % (ref 36–65)
NEUTS SEG NFR BLD: 11.07 K/UL (ref 1.5–8.1)
NEUTS SEG NFR BLD: 8.49 K/UL (ref 1.5–8.1)
NRBC BLD-RTO: 0 PER 100 WBC
NRBC BLD-RTO: 0 PER 100 WBC
PLATELET # BLD AUTO: 142 K/UL (ref 138–453)
PLATELET # BLD AUTO: 147 K/UL (ref 138–453)
PMV BLD AUTO: 11.7 FL (ref 8.1–13.5)
PMV BLD AUTO: 11.9 FL (ref 8.1–13.5)
POTASSIUM SERPL-SCNC: 4.2 MMOL/L (ref 3.7–5.3)
POTASSIUM SERPL-SCNC: 4.2 MMOL/L (ref 3.7–5.3)
RBC # BLD AUTO: 2.63 M/UL (ref 4.21–5.77)
RBC # BLD AUTO: 2.96 M/UL (ref 4.21–5.77)
SODIUM SERPL-SCNC: 140 MMOL/L (ref 136–145)
SODIUM SERPL-SCNC: 146 MMOL/L (ref 136–145)
TROPONIN I SERPL HS-MCNC: 50 NG/L (ref 0–22)
WBC OTHER # BLD: 10.4 K/UL (ref 3.5–11.3)
WBC OTHER # BLD: 12.9 K/UL (ref 3.5–11.3)

## 2025-03-29 PROCEDURE — 99231 SBSQ HOSP IP/OBS SF/LOW 25: CPT | Performed by: HOSPITALIST

## 2025-03-29 PROCEDURE — 51798 US URINE CAPACITY MEASURE: CPT

## 2025-03-29 PROCEDURE — 2060000000 HC ICU INTERMEDIATE R&B

## 2025-03-29 PROCEDURE — 2500000003 HC RX 250 WO HCPCS

## 2025-03-29 PROCEDURE — 97535 SELF CARE MNGMENT TRAINING: CPT

## 2025-03-29 PROCEDURE — 85025 COMPLETE CBC W/AUTO DIFF WBC: CPT

## 2025-03-29 PROCEDURE — 93005 ELECTROCARDIOGRAM TRACING: CPT

## 2025-03-29 PROCEDURE — 36415 COLL VENOUS BLD VENIPUNCTURE: CPT

## 2025-03-29 PROCEDURE — 6360000002 HC RX W HCPCS

## 2025-03-29 PROCEDURE — 6370000000 HC RX 637 (ALT 250 FOR IP)

## 2025-03-29 PROCEDURE — 97166 OT EVAL MOD COMPLEX 45 MIN: CPT

## 2025-03-29 PROCEDURE — 93005 ELECTROCARDIOGRAM TRACING: CPT | Performed by: SURGERY

## 2025-03-29 PROCEDURE — 97530 THERAPEUTIC ACTIVITIES: CPT

## 2025-03-29 PROCEDURE — 36430 TRANSFUSION BLD/BLD COMPNT: CPT

## 2025-03-29 PROCEDURE — P9016 RBC LEUKOCYTES REDUCED: HCPCS

## 2025-03-29 PROCEDURE — 2580000003 HC RX 258

## 2025-03-29 PROCEDURE — 84484 ASSAY OF TROPONIN QUANT: CPT

## 2025-03-29 PROCEDURE — 82947 ASSAY GLUCOSE BLOOD QUANT: CPT

## 2025-03-29 PROCEDURE — 83880 ASSAY OF NATRIURETIC PEPTIDE: CPT

## 2025-03-29 PROCEDURE — 82330 ASSAY OF CALCIUM: CPT

## 2025-03-29 PROCEDURE — 80048 BASIC METABOLIC PNL TOTAL CA: CPT

## 2025-03-29 PROCEDURE — 51701 INSERT BLADDER CATHETER: CPT

## 2025-03-29 PROCEDURE — 85018 HEMOGLOBIN: CPT

## 2025-03-29 PROCEDURE — 97163 PT EVAL HIGH COMPLEX 45 MIN: CPT

## 2025-03-29 PROCEDURE — 83735 ASSAY OF MAGNESIUM: CPT

## 2025-03-29 PROCEDURE — 99232 SBSQ HOSP IP/OBS MODERATE 35: CPT | Performed by: SURGERY

## 2025-03-29 PROCEDURE — 85014 HEMATOCRIT: CPT

## 2025-03-29 RX ORDER — SODIUM CHLORIDE 9 MG/ML
INJECTION, SOLUTION INTRAVENOUS PRN
Status: DISCONTINUED | OUTPATIENT
Start: 2025-03-29 | End: 2025-04-03 | Stop reason: HOSPADM

## 2025-03-29 RX ORDER — SODIUM CHLORIDE, SODIUM LACTATE, POTASSIUM CHLORIDE, CALCIUM CHLORIDE 600; 310; 30; 20 MG/100ML; MG/100ML; MG/100ML; MG/100ML
INJECTION, SOLUTION INTRAVENOUS CONTINUOUS
Status: DISCONTINUED | OUTPATIENT
Start: 2025-03-29 | End: 2025-03-31

## 2025-03-29 RX ORDER — MAGNESIUM SULFATE 1 G/100ML
1000 INJECTION INTRAVENOUS ONCE
Status: COMPLETED | OUTPATIENT
Start: 2025-03-29 | End: 2025-03-29

## 2025-03-29 RX ADMIN — ACETAMINOPHEN 1000 MG: 500 TABLET ORAL at 14:11

## 2025-03-29 RX ADMIN — FERROUS SULFATE TAB EC 325 MG (65 MG FE EQUIVALENT) 325 MG: 325 (65 FE) TABLET DELAYED RESPONSE at 08:41

## 2025-03-29 RX ADMIN — SODIUM CHLORIDE, PRESERVATIVE FREE 10 ML: 5 INJECTION INTRAVENOUS at 20:07

## 2025-03-29 RX ADMIN — Medication 400 MG: at 08:42

## 2025-03-29 RX ADMIN — Medication 2000 UNITS: at 08:41

## 2025-03-29 RX ADMIN — SODIUM CHLORIDE, PRESERVATIVE FREE 10 ML: 5 INJECTION INTRAVENOUS at 08:52

## 2025-03-29 RX ADMIN — INSULIN LISPRO 4 UNITS: 100 INJECTION, SOLUTION INTRAVENOUS; SUBCUTANEOUS at 20:21

## 2025-03-29 RX ADMIN — CLONIDINE HYDROCHLORIDE 0.1 MG: 0.1 TABLET ORAL at 20:07

## 2025-03-29 RX ADMIN — POLYETHYLENE GLYCOL 3350 17 G: 17 POWDER, FOR SOLUTION ORAL at 08:41

## 2025-03-29 RX ADMIN — MEMANTINE 10 MG: 5 TABLET ORAL at 20:07

## 2025-03-29 RX ADMIN — Medication 2000 MG: at 05:54

## 2025-03-29 RX ADMIN — CARVEDILOL 25 MG: 25 TABLET, FILM COATED ORAL at 09:11

## 2025-03-29 RX ADMIN — CARVEDILOL 25 MG: 25 TABLET, FILM COATED ORAL at 16:32

## 2025-03-29 RX ADMIN — INSULIN LISPRO 4 UNITS: 100 INJECTION, SOLUTION INTRAVENOUS; SUBCUTANEOUS at 16:32

## 2025-03-29 RX ADMIN — MEMANTINE 10 MG: 5 TABLET ORAL at 08:42

## 2025-03-29 RX ADMIN — Medication 400 MG: at 20:07

## 2025-03-29 RX ADMIN — MAGNESIUM SULFATE HEPTAHYDRATE 1000 MG: 1 INJECTION, SOLUTION INTRAVENOUS at 14:32

## 2025-03-29 RX ADMIN — FINASTERIDE 5 MG: 5 TABLET, FILM COATED ORAL at 08:52

## 2025-03-29 RX ADMIN — SODIUM CHLORIDE, SODIUM LACTATE, POTASSIUM CHLORIDE, AND CALCIUM CHLORIDE: .6; .31; .03; .02 INJECTION, SOLUTION INTRAVENOUS at 05:40

## 2025-03-29 RX ADMIN — OXYCODONE HYDROCHLORIDE AND ACETAMINOPHEN 500 MG: 500 TABLET ORAL at 08:42

## 2025-03-29 RX ADMIN — CLONIDINE HYDROCHLORIDE 0.1 MG: 0.1 TABLET ORAL at 08:41

## 2025-03-29 RX ADMIN — HEPARIN SODIUM 5000 UNITS: 5000 INJECTION INTRAVENOUS; SUBCUTANEOUS at 14:11

## 2025-03-29 RX ADMIN — AMLODIPINE BESYLATE 2.5 MG: 5 TABLET ORAL at 08:42

## 2025-03-29 RX ADMIN — CLONIDINE HYDROCHLORIDE 0.1 MG: 0.1 TABLET ORAL at 14:11

## 2025-03-29 RX ADMIN — CYANOCOBALAMIN TAB 500 MCG 1000 MCG: 500 TAB at 08:52

## 2025-03-29 RX ADMIN — ACETAMINOPHEN 1000 MG: 500 TABLET ORAL at 20:07

## 2025-03-29 RX ADMIN — ATORVASTATIN CALCIUM 10 MG: 20 TABLET, FILM COATED ORAL at 08:41

## 2025-03-29 RX ADMIN — HEPARIN SODIUM 5000 UNITS: 5000 INJECTION INTRAVENOUS; SUBCUTANEOUS at 20:23

## 2025-03-29 RX ADMIN — HEPARIN SODIUM 5000 UNITS: 5000 INJECTION INTRAVENOUS; SUBCUTANEOUS at 05:54

## 2025-03-29 NOTE — CARE COORDINATION
Case Management   Daily Progress Note       Patient Name: Brandon Smart                   YOB: 1940  Diagnosis: Closed comminuted intertrochanteric fracture of proximal femur, right, initial encounter [S72.141A]  Closed comminuted intertrochanteric fracture of proximal end of right femur, initial encounter [S72.141A]  Pneumonia of right lower lobe due to infectious organism [J18.9]                         days  Length of Stay: 2  days    Anticipated Discharge Date: Two or more days before discharge    Readmission Risk (Low < 19, Mod (19-27), High > 27): Readmission Risk Score: 18.1        Current Transitional Plan    [] Home Independently    [] Home with HC    [x] Skilled Nursing Facility    [x] Acute Rehabilitation    [] Long Term Acute Care (LTAC)    [] Other:     Plan for the Stay (Medical Management) :    Plan is ARU vs SNF, CM to work with family tomorrow for facility choices      Workflow Continuation (Additional Notes) :    1328: CM received voicemail from Patricia, patient's daughter requesting a return call.     1508: CM called and spoke with Patricia, patient's daughter. Patricia reports her and her family are overwhelmed with the situation and just do not know what to do. Patricia reports they just left the hospital, but will return tomorrow. Patriica reports she does have the agency list for both ARU and SNF, however she is just overwhelmed. CELENA told Patricia once she arrives tomorrow to let the nurses know they want to speak with the , and the nurses will call the  to come over and go over the difference in facilities and assist with making a tentative plan for discharge. Patricia voiced understanding and did not have any further questions at this time.     Khushi Golden  March 29, 2025

## 2025-03-29 NOTE — PLAN OF CARE
Problem: Safety - Adult  Goal: Free from fall injury  Outcome: Progressing     Problem: Chronic Conditions and Co-morbidities  Goal: Patient's chronic conditions and co-morbidity symptoms are monitored and maintained or improved  Outcome: Progressing     Problem: Discharge Planning  Goal: Discharge to home or other facility with appropriate resources  Outcome: Progressing     Problem: Skin/Tissue Integrity  Goal: Skin integrity remains intact  Description: 1.  Monitor for areas of redness and/or skin breakdown  2.  Assess vascular access sites hourly  3.  Every 4-6 hours minimum:  Change oxygen saturation probe site  4.  Every 4-6 hours:  If on nasal continuous positive airway pressure, respiratory therapy assess nares and determine need for appliance change or resting period  Outcome: Progressing     Problem: ABCDS Injury Assessment  Goal: Absence of physical injury  Outcome: Progressing     Problem: Confusion  Goal: Confusion, delirium, dementia, or psychosis is improved or at baseline  Description: INTERVENTIONS:  1. Assess for possible contributors to thought disturbance, including medications, impaired vision or hearing, underlying metabolic abnormalities, dehydration, psychiatric diagnoses, and notify attending LIP  2. [place] high risk fall precautions, as indicated  3. Provide frequent short contacts to provide reality reorientation, refocusing and direction  4. Decrease environmental stimuli, including noise as appropriate  5. Monitor and intervene to maintain adequate nutrition, hydration, elimination, sleep and activity  6. If unable to ensure safety without constant attention obtain sitter and review sitter guidelines with assigned personnel  7. Initiate Psychosocial CNS and Spiritual Care consult, as indicated  Outcome: Progressing     Problem: Pain  Goal: Verbalizes/displays adequate comfort level or baseline comfort level  Outcome: Progressing

## 2025-03-29 NOTE — PLAN OF CARE
Problem: Safety - Adult  Goal: Free from fall injury  Outcome: Progressing     Problem: Chronic Conditions and Co-morbidities  Goal: Patient's chronic conditions and co-morbidity symptoms are monitored and maintained or improved  Outcome: Progressing     Problem: Discharge Planning  Goal: Discharge to home or other facility with appropriate resources  Outcome: Progressing     Problem: Skin/Tissue Integrity  Goal: Skin integrity remains intact  Description: 1.  Monitor for areas of redness and/or skin breakdown  2.  Assess vascular access sites hourly  3.  Every 4-6 hours minimum:  Change oxygen saturation probe site  4.  Every 4-6 hours:  If on nasal continuous positive airway pressure, respiratory therapy assess nares and determine need for appliance change or resting period  Outcome: Progressing     Problem: ABCDS Injury Assessment  Goal: Absence of physical injury  Outcome: Progressing     Problem: Confusion  Goal: Confusion, delirium, dementia, or psychosis is improved or at baseline  Description: INTERVENTIONS:  1. Assess for possible contributors to thought disturbance, including medications, impaired vision or hearing, underlying metabolic abnormalities, dehydration, psychiatric diagnoses, and notify attending LIP  2. [place] high risk fall precautions, as indicated  3. Provide frequent short contacts to provide reality reorientation, refocusing and direction  4. Decrease environmental stimuli, including noise as appropriate  5. Monitor and intervene to maintain adequate nutrition, hydration, elimination, sleep and activity  6. If unable to ensure safety without constant attention obtain sitter and review sitter guidelines with assigned personnel  7. Initiate Psychosocial CNS and Spiritual Care consult, as indicated  Outcome: Progressing

## 2025-03-29 NOTE — ANESTHESIA POSTPROCEDURE EVALUATION
Department of Anesthesiology  Postprocedure Note    Patient: Brandon Smart  MRN: [id number]  YOB: 1940  Date of evaluation: 3/28/2025    Procedure Summary       Date: 03/28/25 Room / Location: [address]    Anesthesia Start: 1421 Anesthesia Stop: 1720    Procedure: FEMUR CEPHALOMEDULLARY NAIL (C-ARM, SYNTHES, STERILE TRACTION) (Right: Leg Upper) Diagnosis:       Closed fracture of right femur, unspecified fracture morphology, unspecified portion of femur, initial encounter      (Closed fracture of right femur, unspecified fracture morphology, unspecified portion of femur, initial encounter [S72.91XA])    Surgeons: Mayo Madsen DO Responsible Provider: Alejandro Daugherty MD    Anesthesia Type: general ASA Status: 4            Anesthesia Type: No value filed.    Micheal Phase I: Micheal Score: 7    Micheal Phase II:      Anesthesia Post Evaluation    Patient location during evaluation: bedside  Patient participation: complete - patient participated  Level of consciousness: awake and alert  Airway patency: patent  Nausea & Vomiting: no nausea and no vomiting  Cardiovascular status: hemodynamically stable  Respiratory status: acceptable  Hydration status: stable  Comments: BP (!) 131/92   Pulse 77   Temp 98 °F (36.7 °C) (Oral)   Resp 16   Ht 1.651 m (5' 5\")   Wt 61.2 kg (135 lb)   SpO2 97%   BMI 22.47 kg/m²     Pain management: adequate    No notable events documented.

## 2025-03-30 LAB
ABO/RH: NORMAL
ANION GAP SERPL CALCULATED.3IONS-SCNC: 10 MMOL/L (ref 9–16)
ANTIBODY SCREEN: NEGATIVE
ARM BAND NUMBER: NORMAL
BASOPHILS # BLD: <0.03 K/UL (ref 0–0.2)
BASOPHILS NFR BLD: 0 % (ref 0–2)
BLOOD BANK BLOOD PRODUCT EXPIRATION DATE: NORMAL
BLOOD BANK DISPENSE STATUS: NORMAL
BLOOD BANK ISBT PRODUCT BLOOD TYPE: 5100
BLOOD BANK PRODUCT CODE: NORMAL
BLOOD BANK SAMPLE EXPIRATION: NORMAL
BLOOD BANK UNIT TYPE AND RH: NORMAL
BPU ID: NORMAL
BUN SERPL-MCNC: 41 MG/DL (ref 8–23)
CALCIUM SERPL-MCNC: 8.9 MG/DL (ref 8.6–10.4)
CHLORIDE SERPL-SCNC: 105 MMOL/L (ref 98–107)
CO2 SERPL-SCNC: 23 MMOL/L (ref 20–31)
COMPONENT: NORMAL
CREAT SERPL-MCNC: 1.5 MG/DL (ref 0.7–1.2)
CROSSMATCH RESULT: NORMAL
EOSINOPHIL # BLD: 0.31 K/UL (ref 0–0.44)
EOSINOPHILS RELATIVE PERCENT: 4 % (ref 1–4)
ERYTHROCYTE [DISTWIDTH] IN BLOOD BY AUTOMATED COUNT: 13.2 % (ref 11.8–14.4)
GFR, ESTIMATED: 45 ML/MIN/1.73M2
GLUCOSE BLD-MCNC: 154 MG/DL (ref 75–110)
GLUCOSE BLD-MCNC: 193 MG/DL (ref 75–110)
GLUCOSE BLD-MCNC: 225 MG/DL (ref 75–110)
GLUCOSE SERPL-MCNC: 197 MG/DL (ref 74–99)
HCT VFR BLD AUTO: 25.1 % (ref 40.7–50.3)
HGB BLD-MCNC: 8 G/DL (ref 13–17)
IMM GRANULOCYTES # BLD AUTO: 0.05 K/UL (ref 0–0.3)
IMM GRANULOCYTES NFR BLD: 1 %
LYMPHOCYTES NFR BLD: 0.82 K/UL (ref 1.1–3.7)
LYMPHOCYTES RELATIVE PERCENT: 10 % (ref 24–43)
MCH RBC QN AUTO: 27.7 PG (ref 25.2–33.5)
MCHC RBC AUTO-ENTMCNC: 31.9 G/DL (ref 28.4–34.8)
MCV RBC AUTO: 86.9 FL (ref 82.6–102.9)
MONOCYTES NFR BLD: 0.79 K/UL (ref 0.1–1.2)
MONOCYTES NFR BLD: 10 % (ref 3–12)
NEUTROPHILS NFR BLD: 76 % (ref 36–65)
NEUTS SEG NFR BLD: 6.32 K/UL (ref 1.5–8.1)
NRBC BLD-RTO: 0 PER 100 WBC
PLATELET # BLD AUTO: ABNORMAL K/UL (ref 138–453)
PLATELET, FLUORESCENCE: 131 K/UL (ref 138–453)
PLATELETS.RETICULATED NFR BLD AUTO: 3.8 % (ref 1.1–10.3)
POTASSIUM SERPL-SCNC: 3.9 MMOL/L (ref 3.7–5.3)
RBC # BLD AUTO: 2.89 M/UL (ref 4.21–5.77)
SODIUM SERPL-SCNC: 138 MMOL/L (ref 136–145)
TRANSFUSION STATUS: NORMAL
UNIT DIVISION: 0
UNIT ISSUE DATE/TIME: NORMAL
WBC OTHER # BLD: 8.3 K/UL (ref 3.5–11.3)

## 2025-03-30 PROCEDURE — 2500000003 HC RX 250 WO HCPCS

## 2025-03-30 PROCEDURE — 85025 COMPLETE CBC W/AUTO DIFF WBC: CPT

## 2025-03-30 PROCEDURE — 51702 INSERT TEMP BLADDER CATH: CPT

## 2025-03-30 PROCEDURE — 6370000000 HC RX 637 (ALT 250 FOR IP)

## 2025-03-30 PROCEDURE — 6360000002 HC RX W HCPCS

## 2025-03-30 PROCEDURE — 2060000000 HC ICU INTERMEDIATE R&B

## 2025-03-30 PROCEDURE — 99231 SBSQ HOSP IP/OBS SF/LOW 25: CPT | Performed by: SURGERY

## 2025-03-30 PROCEDURE — 82947 ASSAY GLUCOSE BLOOD QUANT: CPT

## 2025-03-30 PROCEDURE — 6370000000 HC RX 637 (ALT 250 FOR IP): Performed by: STUDENT IN AN ORGANIZED HEALTH CARE EDUCATION/TRAINING PROGRAM

## 2025-03-30 PROCEDURE — 80048 BASIC METABOLIC PNL TOTAL CA: CPT

## 2025-03-30 PROCEDURE — 6370000000 HC RX 637 (ALT 250 FOR IP): Performed by: HOSPITALIST

## 2025-03-30 PROCEDURE — 36415 COLL VENOUS BLD VENIPUNCTURE: CPT

## 2025-03-30 PROCEDURE — 85055 RETICULATED PLATELET ASSAY: CPT

## 2025-03-30 PROCEDURE — 99232 SBSQ HOSP IP/OBS MODERATE 35: CPT | Performed by: HOSPITALIST

## 2025-03-30 PROCEDURE — 2580000003 HC RX 258

## 2025-03-30 RX ORDER — TAMSULOSIN HYDROCHLORIDE 0.4 MG/1
0.4 CAPSULE ORAL DAILY
Status: DISCONTINUED | OUTPATIENT
Start: 2025-03-30 | End: 2025-04-03 | Stop reason: HOSPADM

## 2025-03-30 RX ORDER — LIDOCAINE HYDROCHLORIDE 20 MG/ML
JELLY TOPICAL
Status: DISCONTINUED | OUTPATIENT
Start: 2025-03-30 | End: 2025-04-03 | Stop reason: HOSPADM

## 2025-03-30 RX ORDER — AMLODIPINE BESYLATE 10 MG/1
10 TABLET ORAL DAILY
Status: DISCONTINUED | OUTPATIENT
Start: 2025-03-31 | End: 2025-04-03 | Stop reason: HOSPADM

## 2025-03-30 RX ORDER — AMLODIPINE BESYLATE 5 MG/1
5 TABLET ORAL ONCE
Status: COMPLETED | OUTPATIENT
Start: 2025-03-30 | End: 2025-03-30

## 2025-03-30 RX ADMIN — SODIUM CHLORIDE, PRESERVATIVE FREE 10 ML: 5 INJECTION INTRAVENOUS at 10:17

## 2025-03-30 RX ADMIN — CLONIDINE HYDROCHLORIDE 0.1 MG: 0.1 TABLET ORAL at 12:24

## 2025-03-30 RX ADMIN — INSULIN LISPRO 4 UNITS: 100 INJECTION, SOLUTION INTRAVENOUS; SUBCUTANEOUS at 21:32

## 2025-03-30 RX ADMIN — CYANOCOBALAMIN TAB 500 MCG 1000 MCG: 500 TAB at 10:17

## 2025-03-30 RX ADMIN — MEMANTINE 10 MG: 5 TABLET ORAL at 19:50

## 2025-03-30 RX ADMIN — ACETAMINOPHEN 1000 MG: 500 TABLET ORAL at 06:01

## 2025-03-30 RX ADMIN — HEPARIN SODIUM 5000 UNITS: 5000 INJECTION INTRAVENOUS; SUBCUTANEOUS at 06:01

## 2025-03-30 RX ADMIN — ACETAMINOPHEN 1000 MG: 500 TABLET ORAL at 20:36

## 2025-03-30 RX ADMIN — HEPARIN SODIUM 5000 UNITS: 5000 INJECTION INTRAVENOUS; SUBCUTANEOUS at 20:36

## 2025-03-30 RX ADMIN — DONEPEZIL HYDROCHLORIDE 10 MG: 10 TABLET, FILM COATED ORAL at 19:50

## 2025-03-30 RX ADMIN — ACETAMINOPHEN 1000 MG: 500 TABLET ORAL at 12:24

## 2025-03-30 RX ADMIN — CLONIDINE HYDROCHLORIDE 0.1 MG: 0.1 TABLET ORAL at 19:50

## 2025-03-30 RX ADMIN — AMLODIPINE BESYLATE 5 MG: 5 TABLET ORAL at 12:24

## 2025-03-30 RX ADMIN — TAMSULOSIN HYDROCHLORIDE 0.4 MG: 0.4 CAPSULE ORAL at 10:22

## 2025-03-30 RX ADMIN — SODIUM CHLORIDE, PRESERVATIVE FREE 10 ML: 5 INJECTION INTRAVENOUS at 19:50

## 2025-03-30 RX ADMIN — INSULIN LISPRO 4 UNITS: 100 INJECTION, SOLUTION INTRAVENOUS; SUBCUTANEOUS at 10:18

## 2025-03-30 RX ADMIN — Medication 400 MG: at 19:50

## 2025-03-30 RX ADMIN — POLYETHYLENE GLYCOL 3350 17 G: 17 POWDER, FOR SOLUTION ORAL at 10:19

## 2025-03-30 RX ADMIN — AMLODIPINE BESYLATE 2.5 MG: 5 TABLET ORAL at 10:16

## 2025-03-30 RX ADMIN — HEPARIN SODIUM 5000 UNITS: 5000 INJECTION INTRAVENOUS; SUBCUTANEOUS at 12:25

## 2025-03-30 RX ADMIN — OXYCODONE HYDROCHLORIDE AND ACETAMINOPHEN 500 MG: 500 TABLET ORAL at 10:18

## 2025-03-30 RX ADMIN — SODIUM CHLORIDE, SODIUM LACTATE, POTASSIUM CHLORIDE, AND CALCIUM CHLORIDE: .6; .31; .03; .02 INJECTION, SOLUTION INTRAVENOUS at 21:35

## 2025-03-30 RX ADMIN — INSULIN LISPRO 4 UNITS: 100 INJECTION, SOLUTION INTRAVENOUS; SUBCUTANEOUS at 17:55

## 2025-03-30 RX ADMIN — ATORVASTATIN CALCIUM 10 MG: 20 TABLET, FILM COATED ORAL at 10:17

## 2025-03-30 RX ADMIN — SODIUM CHLORIDE, SODIUM LACTATE, POTASSIUM CHLORIDE, AND CALCIUM CHLORIDE: .6; .31; .03; .02 INJECTION, SOLUTION INTRAVENOUS at 02:20

## 2025-03-30 RX ADMIN — Medication 400 MG: at 10:18

## 2025-03-30 RX ADMIN — FERROUS SULFATE TAB EC 325 MG (65 MG FE EQUIVALENT) 325 MG: 325 (65 FE) TABLET DELAYED RESPONSE at 10:16

## 2025-03-30 RX ADMIN — FINASTERIDE 5 MG: 5 TABLET, FILM COATED ORAL at 10:18

## 2025-03-30 RX ADMIN — Medication 2000 UNITS: at 10:17

## 2025-03-30 RX ADMIN — CARVEDILOL 25 MG: 25 TABLET, FILM COATED ORAL at 10:17

## 2025-03-30 RX ADMIN — CLONIDINE HYDROCHLORIDE 0.1 MG: 0.1 TABLET ORAL at 10:17

## 2025-03-30 RX ADMIN — MEMANTINE 10 MG: 5 TABLET ORAL at 10:18

## 2025-03-30 RX ADMIN — CARVEDILOL 25 MG: 25 TABLET, FILM COATED ORAL at 17:55

## 2025-03-30 ASSESSMENT — PAIN SCALES - GENERAL: PAINLEVEL_OUTOF10: 0

## 2025-03-30 NOTE — CARE COORDINATION
Case Management   Daily Progress Note       Patient Name: Brandon Smart                   YOB: 1940  Diagnosis: Closed comminuted intertrochanteric fracture of proximal femur, right, initial encounter [S72.141A]  Closed comminuted intertrochanteric fracture of proximal end of right femur, initial encounter [S72.141A]  Pneumonia of right lower lobe due to infectious organism [J18.9]                         days  Length of Stay: 3  days    Anticipated Discharge Date: Two or more days before discharge    Readmission Risk (Low < 19, Mod (19-27), High > 27): Readmission Risk Score: 18.4        Current Transitional Plan    [] Home Independently    [] Home with HC    [x] Skilled Nursing Facility    [] Acute Rehabilitation    [] Long Term Acute Care (LTAC)    [] Other:     Plan for the Stay (Medical Management) : Daughter to choose SNF with memory care unit, give choices 3/31          Workflow Continuation (Additional Notes) : Writer met with Patricia, jonathan daughter at patient's bedside to discuss choices for referrals. Patricia needed explanation of the differences between SNF vs ARU. Writer explained and Patricia verbalized understanding. CELENA Puri highlighted SNFs with memory care units for patient to go over and choose 3 to send referrals to. Patricia was getting ready to leave to go home and stated she would give choices tomorrow. Writer told CELENA Gomez will reach out to her for choices at bedside or over the phone.        Jaclyn Carrasquillo  March 30, 2025

## 2025-03-30 NOTE — PLAN OF CARE
Problem: Safety - Adult  Goal: Free from fall injury  Outcome: Progressing     Problem: Chronic Conditions and Co-morbidities  Goal: Patient's chronic conditions and co-morbidity symptoms are monitored and maintained or improved  Outcome: Progressing     Problem: Discharge Planning  Goal: Discharge to home or other facility with appropriate resources  Outcome: Progressing     Problem: Skin/Tissue Integrity  Goal: Skin integrity remains intact  Description: 1.  Monitor for areas of redness and/or skin breakdown  2.  Assess vascular access sites hourly  3.  Every 4-6 hours minimum:  Change oxygen saturation probe site  4.  Every 4-6 hours:  If on nasal continuous positive airway pressure, respiratory therapy assess nares and determine need for appliance change or resting period  Outcome: Progressing     Problem: ABCDS Injury Assessment  Goal: Absence of physical injury  Outcome: Progressing     Problem: Confusion  Goal: Confusion, delirium, dementia, or psychosis is improved or at baseline  Description: INTERVENTIONS:  1. Assess for possible contributors to thought disturbance, including medications, impaired vision or hearing, underlying metabolic abnormalities, dehydration, psychiatric diagnoses, and notify attending LIP  2. [place] high risk fall precautions, as indicated  3. Provide frequent short contacts to provide reality reorientation, refocusing and direction  4. Decrease environmental stimuli, including noise as appropriate  5. Monitor and intervene to maintain adequate nutrition, hydration, elimination, sleep and activity  6. If unable to ensure safety without constant attention obtain sitter and review sitter guidelines with assigned personnel  7. Initiate Psychosocial CNS and Spiritual Care consult, as indicated  Outcome: Progressing     Problem: Pain  Goal: Verbalizes/displays adequate comfort level or baseline comfort level  Outcome: Progressing

## 2025-03-30 NOTE — PLAN OF CARE
Problem: Safety - Adult  Goal: Free from fall injury  3/29/2025 2243 by Dayanara Ogden RN  Outcome: Progressing     Problem: Chronic Conditions and Co-morbidities  Goal: Patient's chronic conditions and co-morbidity symptoms are monitored and maintained or improved  3/29/2025 2243 by Dayanara Ogedn RN  Outcome: Progressing     Problem: Discharge Planning  Goal: Discharge to home or other facility with appropriate resources  3/29/2025 2243 by Dayanara Ogden RN  Outcome: Progressing     Problem: Skin/Tissue Integrity  Goal: Skin integrity remains intact  Description: 1.  Monitor for areas of redness and/or skin breakdown  2.  Assess vascular access sites hourly  3.  Every 4-6 hours minimum:  Change oxygen saturation probe site  4.  Every 4-6 hours:  If on nasal continuous positive airway pressure, respiratory therapy assess nares and determine need for appliance change or resting period  3/29/2025 2243 by Dayanara Ogden RN  Outcome: Progressing     Problem: ABCDS Injury Assessment  Goal: Absence of physical injury  3/29/2025 2243 by Dayanara Ogden RN  Outcome: Progressing     Problem: Confusion  Goal: Confusion, delirium, dementia, or psychosis is improved or at baseline  Description: INTERVENTIONS:  1. Assess for possible contributors to thought disturbance, including medications, impaired vision or hearing, underlying metabolic abnormalities, dehydration, psychiatric diagnoses, and notify attending LIP  2. [place] high risk fall precautions, as indicated  3. Provide frequent short contacts to provide reality reorientation, refocusing and direction  4. Decrease environmental stimuli, including noise as appropriate  5. Monitor and intervene to maintain adequate nutrition, hydration, elimination, sleep and activity  6. If unable to ensure safety without constant attention obtain sitter and review sitter guidelines with assigned personnel  7. Initiate Psychosocial CNS and Spiritual Care consult, as

## 2025-03-30 NOTE — CONSENT
Informed Consent for Blood Component Transfusion Note    I have discussed with the daughter the rationale for blood component transfusion; its benefits in treating or preventing fatigue, organ damage, or death; and its risk which includes mild transfusion reactions, rare risk of blood borne infection, or more serious but rare reactions. I have discussed the alternatives to transfusion, including the risk and consequences of not receiving transfusion. The daughter had an opportunity to ask questions and had agreed to proceed with transfusion of blood components.    Electronically signed by Delia Hernandez DO on 3/29/25 at 9:16 PM EDT    Delia Hernandez DO  Emergency Medicine PGY-1  Trauma Surgery Service

## 2025-03-31 ENCOUNTER — APPOINTMENT (OUTPATIENT)
Dept: GENERAL RADIOLOGY | Age: 85
DRG: 480 | End: 2025-03-31
Payer: MEDICARE

## 2025-03-31 LAB
ANION GAP SERPL CALCULATED.3IONS-SCNC: 9 MMOL/L (ref 9–16)
BASOPHILS # BLD: 0 K/UL (ref 0–0.2)
BASOPHILS NFR BLD: 0 % (ref 0–2)
BNP SERPL-MCNC: 7708 PG/ML (ref 0–450)
BUN SERPL-MCNC: 34 MG/DL (ref 8–23)
CALCIUM SERPL-MCNC: 9 MG/DL (ref 8.6–10.4)
CHLORIDE SERPL-SCNC: 106 MMOL/L (ref 98–107)
CO2 SERPL-SCNC: 27 MMOL/L (ref 20–31)
CREAT SERPL-MCNC: 1.1 MG/DL (ref 0.7–1.2)
EKG ATRIAL RATE: 115 BPM
EKG ATRIAL RATE: 59 BPM
EKG ATRIAL RATE: 64 BPM
EKG ATRIAL RATE: 76 BPM
EKG P AXIS: 60 DEGREES
EKG P-R INTERVAL: 210 MS
EKG P-R INTERVAL: 230 MS
EKG Q-T INTERVAL: 430 MS
EKG Q-T INTERVAL: 454 MS
EKG Q-T INTERVAL: 456 MS
EKG Q-T INTERVAL: 482 MS
EKG QRS DURATION: 142 MS
EKG QRS DURATION: 168 MS
EKG QTC CALCULATION (BAZETT): 483 MS
EKG QTC CALCULATION (BAZETT): 530 MS
EKG QTC CALCULATION (BAZETT): 538 MS
EKG QTC CALCULATION (BAZETT): 669 MS
EKG R AXIS: -84 DEGREES
EKG R AXIS: 123 DEGREES
EKG R AXIS: 128 DEGREES
EKG R AXIS: 131 DEGREES
EKG T AXIS: -10 DEGREES
EKG T AXIS: 12 DEGREES
EKG T AXIS: 8 DEGREES
EKG T AXIS: 97 DEGREES
EKG VENTRICULAR RATE: 116 BPM
EKG VENTRICULAR RATE: 76 BPM
EKG VENTRICULAR RATE: 82 BPM
EKG VENTRICULAR RATE: 84 BPM
EOSINOPHIL # BLD: 0.52 K/UL (ref 0–0.44)
EOSINOPHILS RELATIVE PERCENT: 6 % (ref 1–4)
ERYTHROCYTE [DISTWIDTH] IN BLOOD BY AUTOMATED COUNT: 13.2 % (ref 11.8–14.4)
GFR, ESTIMATED: 65 ML/MIN/1.73M2
GLUCOSE BLD-MCNC: 152 MG/DL (ref 75–110)
GLUCOSE BLD-MCNC: 170 MG/DL (ref 75–110)
GLUCOSE BLD-MCNC: 173 MG/DL (ref 75–110)
GLUCOSE BLD-MCNC: 176 MG/DL (ref 75–110)
GLUCOSE BLD-MCNC: 199 MG/DL (ref 75–110)
GLUCOSE BLD-MCNC: 209 MG/DL (ref 75–110)
GLUCOSE SERPL-MCNC: 114 MG/DL (ref 74–99)
HCT VFR BLD AUTO: 25.1 % (ref 40.7–50.3)
HGB BLD-MCNC: 8 G/DL (ref 13–17)
IMM GRANULOCYTES # BLD AUTO: 0.17 K/UL (ref 0–0.3)
IMM GRANULOCYTES NFR BLD: 2 %
LYMPHOCYTES NFR BLD: 0.61 K/UL (ref 1.1–3.7)
LYMPHOCYTES RELATIVE PERCENT: 7 % (ref 24–43)
MAGNESIUM SERPL-MCNC: 2.6 MG/DL (ref 1.6–2.4)
MCH RBC QN AUTO: 28.5 PG (ref 25.2–33.5)
MCHC RBC AUTO-ENTMCNC: 31.9 G/DL (ref 28.4–34.8)
MCV RBC AUTO: 89.3 FL (ref 82.6–102.9)
MONOCYTES NFR BLD: 0.7 K/UL (ref 0.1–1.2)
MONOCYTES NFR BLD: 8 % (ref 3–12)
MORPHOLOGY: NORMAL
NEUTROPHILS NFR BLD: 77 % (ref 36–65)
NEUTS SEG NFR BLD: 6.7 K/UL (ref 1.5–8.1)
NRBC BLD-RTO: 0 PER 100 WBC
PLATELET # BLD AUTO: 159 K/UL (ref 138–453)
PMV BLD AUTO: 11.6 FL (ref 8.1–13.5)
POTASSIUM SERPL-SCNC: 3.8 MMOL/L (ref 3.7–5.3)
RBC # BLD AUTO: 2.81 M/UL (ref 4.21–5.77)
SODIUM SERPL-SCNC: 142 MMOL/L (ref 136–145)
WBC OTHER # BLD: 8.7 K/UL (ref 3.5–11.3)

## 2025-03-31 PROCEDURE — 71045 X-RAY EXAM CHEST 1 VIEW: CPT

## 2025-03-31 PROCEDURE — 99232 SBSQ HOSP IP/OBS MODERATE 35: CPT | Performed by: HOSPITALIST

## 2025-03-31 PROCEDURE — 36415 COLL VENOUS BLD VENIPUNCTURE: CPT

## 2025-03-31 PROCEDURE — 99232 SBSQ HOSP IP/OBS MODERATE 35: CPT | Performed by: PHYSICAL MEDICINE & REHABILITATION

## 2025-03-31 PROCEDURE — 2060000000 HC ICU INTERMEDIATE R&B

## 2025-03-31 PROCEDURE — 6360000002 HC RX W HCPCS

## 2025-03-31 PROCEDURE — 99231 SBSQ HOSP IP/OBS SF/LOW 25: CPT | Performed by: NURSE PRACTITIONER

## 2025-03-31 PROCEDURE — 94640 AIRWAY INHALATION TREATMENT: CPT

## 2025-03-31 PROCEDURE — 6370000000 HC RX 637 (ALT 250 FOR IP)

## 2025-03-31 PROCEDURE — 82947 ASSAY GLUCOSE BLOOD QUANT: CPT

## 2025-03-31 PROCEDURE — 6370000000 HC RX 637 (ALT 250 FOR IP): Performed by: HOSPITALIST

## 2025-03-31 PROCEDURE — 83880 ASSAY OF NATRIURETIC PEPTIDE: CPT

## 2025-03-31 PROCEDURE — 99233 SBSQ HOSP IP/OBS HIGH 50: CPT | Performed by: NURSE PRACTITIONER

## 2025-03-31 PROCEDURE — 6370000000 HC RX 637 (ALT 250 FOR IP): Performed by: NURSE PRACTITIONER

## 2025-03-31 PROCEDURE — 83735 ASSAY OF MAGNESIUM: CPT

## 2025-03-31 PROCEDURE — 85025 COMPLETE CBC W/AUTO DIFF WBC: CPT

## 2025-03-31 PROCEDURE — 80048 BASIC METABOLIC PNL TOTAL CA: CPT

## 2025-03-31 PROCEDURE — 6370000000 HC RX 637 (ALT 250 FOR IP): Performed by: STUDENT IN AN ORGANIZED HEALTH CARE EDUCATION/TRAINING PROGRAM

## 2025-03-31 PROCEDURE — 2500000003 HC RX 250 WO HCPCS

## 2025-03-31 PROCEDURE — 2580000003 HC RX 258

## 2025-03-31 RX ORDER — LISINOPRIL 20 MG/1
20 TABLET ORAL DAILY
Status: DISCONTINUED | OUTPATIENT
Start: 2025-03-31 | End: 2025-04-01

## 2025-03-31 RX ORDER — LABETALOL HYDROCHLORIDE 5 MG/ML
10 INJECTION, SOLUTION INTRAVENOUS EVERY 4 HOURS PRN
Status: DISCONTINUED | OUTPATIENT
Start: 2025-03-31 | End: 2025-04-03 | Stop reason: HOSPADM

## 2025-03-31 RX ORDER — HYDRALAZINE HYDROCHLORIDE 20 MG/ML
10 INJECTION INTRAMUSCULAR; INTRAVENOUS EVERY 4 HOURS PRN
Status: DISCONTINUED | OUTPATIENT
Start: 2025-03-31 | End: 2025-04-03 | Stop reason: HOSPADM

## 2025-03-31 RX ORDER — LISINOPRIL 20 MG/1
20 TABLET ORAL DAILY
Qty: 30 TABLET | Refills: 3
Start: 2025-04-01 | End: 2025-04-01 | Stop reason: HOSPADM

## 2025-03-31 RX ORDER — ASPIRIN 81 MG/1
81 TABLET ORAL DAILY
Status: DISCONTINUED | OUTPATIENT
Start: 2025-03-31 | End: 2025-04-03 | Stop reason: HOSPADM

## 2025-03-31 RX ORDER — CEFDINIR 300 MG/1
300 CAPSULE ORAL 2 TIMES DAILY
Qty: 5 CAPSULE | Refills: 0 | Status: SHIPPED | OUTPATIENT
Start: 2025-03-31 | End: 2025-04-03 | Stop reason: HOSPADM

## 2025-03-31 RX ORDER — CEFDINIR 300 MG/1
300 CAPSULE ORAL 2 TIMES DAILY
Status: COMPLETED | OUTPATIENT
Start: 2025-03-31 | End: 2025-04-02

## 2025-03-31 RX ORDER — SODIUM CHLORIDE FOR INHALATION 3 %
4 VIAL, NEBULIZER (ML) INHALATION ONCE
Status: COMPLETED | OUTPATIENT
Start: 2025-03-31 | End: 2025-03-31

## 2025-03-31 RX ADMIN — CLONIDINE HYDROCHLORIDE 0.1 MG: 0.1 TABLET ORAL at 08:34

## 2025-03-31 RX ADMIN — SODIUM CHLORIDE, PRESERVATIVE FREE 10 ML: 5 INJECTION INTRAVENOUS at 21:53

## 2025-03-31 RX ADMIN — ASPIRIN 81 MG: 81 TABLET, COATED ORAL at 21:50

## 2025-03-31 RX ADMIN — ACETAMINOPHEN 1000 MG: 500 TABLET ORAL at 21:51

## 2025-03-31 RX ADMIN — CLONIDINE HYDROCHLORIDE 0.1 MG: 0.1 TABLET ORAL at 15:03

## 2025-03-31 RX ADMIN — Medication 400 MG: at 21:50

## 2025-03-31 RX ADMIN — FERROUS SULFATE TAB EC 325 MG (65 MG FE EQUIVALENT) 325 MG: 325 (65 FE) TABLET DELAYED RESPONSE at 08:34

## 2025-03-31 RX ADMIN — MEMANTINE 10 MG: 5 TABLET ORAL at 08:34

## 2025-03-31 RX ADMIN — OXYCODONE HYDROCHLORIDE AND ACETAMINOPHEN 500 MG: 500 TABLET ORAL at 08:33

## 2025-03-31 RX ADMIN — TAMSULOSIN HYDROCHLORIDE 0.4 MG: 0.4 CAPSULE ORAL at 08:33

## 2025-03-31 RX ADMIN — Medication 400 MG: at 08:34

## 2025-03-31 RX ADMIN — SODIUM CHLORIDE 30 MG/ML INHALATION SOLUTION 4 ML: 30 SOLUTION INHALANT at 08:25

## 2025-03-31 RX ADMIN — ACETAMINOPHEN 1000 MG: 500 TABLET ORAL at 05:50

## 2025-03-31 RX ADMIN — ATORVASTATIN CALCIUM 10 MG: 20 TABLET, FILM COATED ORAL at 08:34

## 2025-03-31 RX ADMIN — CEFDINIR 300 MG: 300 CAPSULE ORAL at 11:46

## 2025-03-31 RX ADMIN — DONEPEZIL HYDROCHLORIDE 10 MG: 10 TABLET, FILM COATED ORAL at 21:50

## 2025-03-31 RX ADMIN — AMLODIPINE BESYLATE 10 MG: 10 TABLET ORAL at 08:34

## 2025-03-31 RX ADMIN — MEMANTINE 10 MG: 5 TABLET ORAL at 21:50

## 2025-03-31 RX ADMIN — HYDRALAZINE HYDROCHLORIDE 10 MG: 20 INJECTION INTRAMUSCULAR; INTRAVENOUS at 04:35

## 2025-03-31 RX ADMIN — Medication 10 MG: at 04:02

## 2025-03-31 RX ADMIN — INSULIN LISPRO 4 UNITS: 100 INJECTION, SOLUTION INTRAVENOUS; SUBCUTANEOUS at 21:50

## 2025-03-31 RX ADMIN — CARVEDILOL 25 MG: 25 TABLET, FILM COATED ORAL at 08:34

## 2025-03-31 RX ADMIN — CYANOCOBALAMIN TAB 500 MCG 1000 MCG: 500 TAB at 08:34

## 2025-03-31 RX ADMIN — HEPARIN SODIUM 5000 UNITS: 5000 INJECTION INTRAVENOUS; SUBCUTANEOUS at 21:50

## 2025-03-31 RX ADMIN — SODIUM CHLORIDE, PRESERVATIVE FREE 10 ML: 5 INJECTION INTRAVENOUS at 08:35

## 2025-03-31 RX ADMIN — HEPARIN SODIUM 5000 UNITS: 5000 INJECTION INTRAVENOUS; SUBCUTANEOUS at 15:02

## 2025-03-31 RX ADMIN — ACETAMINOPHEN 1000 MG: 500 TABLET ORAL at 15:03

## 2025-03-31 RX ADMIN — Medication 2000 UNITS: at 08:34

## 2025-03-31 RX ADMIN — CARVEDILOL 37.5 MG: 25 TABLET, FILM COATED ORAL at 18:08

## 2025-03-31 RX ADMIN — POLYETHYLENE GLYCOL 3350 17 G: 17 POWDER, FOR SOLUTION ORAL at 08:33

## 2025-03-31 RX ADMIN — CLONIDINE HYDROCHLORIDE 0.1 MG: 0.1 TABLET ORAL at 21:50

## 2025-03-31 RX ADMIN — CEFDINIR 300 MG: 300 CAPSULE ORAL at 21:51

## 2025-03-31 RX ADMIN — FINASTERIDE 5 MG: 5 TABLET, FILM COATED ORAL at 08:33

## 2025-03-31 RX ADMIN — LISINOPRIL 20 MG: 20 TABLET ORAL at 08:34

## 2025-03-31 RX ADMIN — HEPARIN SODIUM 5000 UNITS: 5000 INJECTION INTRAVENOUS; SUBCUTANEOUS at 05:50

## 2025-03-31 ASSESSMENT — PAIN SCALES - GENERAL: PAINLEVEL_OUTOF10: 2

## 2025-03-31 NOTE — CARE COORDINATION
Trauma Coordinator Rounding Note  Daily check in:  I met with Brandon Samrt  at bedside to review their plan of care. I allowed opportunity for Brandon Smart to ask questions regarding their injuries, expected length of stay and disposition plan. All questions answered to verbal satisfaction.   [x] Wounds  [x] PT/OT/speech  [x] Incentive spirometer  [x] Diet  [x] Activity  [] Paperwork  [x] Pain Management Check-in  Bedside Nurse:  I spoke with the patient's assigned nurse to review the plan of care for today and provide an opportunity to ask questions or relay any concerns to the Trauma service.    :  I provided a clinical update to the unit  and confirmed the disposition plan. Current barriers to discharge include:  awaiting facility choices from family.   Electronically signed by Julieta Traylor RN on 3/31/2025 at 10:16 AM

## 2025-03-31 NOTE — CONSULTS
Baldemar Bell, Maurisio Pinto Mostafa, Colby, Waldo Jr.  Urology Consult      Patient:  Brandon Smart  MRN: [id number]  YOB: 1940    CHIEF COMPLAINT:  Urinary retention    HISTORY OF PRESENT ILLNESS:   The patient is a 84 y.o. male with a history of nonmuscle invasive bladder cancer, status post TURBT and BCG x 6 with Dr. Escobar in 2019.  He presented to the hospital after a fall and femur fracture, did undergo nail placement and right femur per orthopedic surgery on 3/28.  He did have urinary retention yesterday, unable to void with a bladder scan of 150 cc, Wells catheter was subsequently placed and draining clear yellow urine.  He did have an acute kidney injury with a creatinine 1.5 which is improving to 1.1.  Urinalysis with no signs of infection, microscopic hematuria.  He did have a CT pelvis which showed no  abnormalities.  He was started on Flomax yesterday.    Patient's old records, notes and chart reviewed and summarized above.    Past Medical History:    Past Medical History:   Diagnosis Date    Alzheimer dementia (HCC)     CAD (coronary artery disease)     Cataract     BILAT.    Diabetes mellitus (HCC)     Goiter, nontoxic, multinodular     Hearing loss, bilateral     [place] (hard of hearing)     BILAT. HEARING AIDS    Hyperlipidemia     Hypertension     Mediastinal mass 02/14/2014    MEDIASTINOSCOPY    Osteoarthritis     Peptic ulcer     TIA (transient ischemic attack)     Wears glasses     Wears partial dentures     UPPER       Past Surgical History:    Past Surgical History:   Procedure Laterality Date    BACK SURGERY  01/01/2007    LUMBAR LAMI    CARDIAC CATHETERIZATION  2000,2003,2009    CARDIAC SURGERY      CARPAL TUNNEL RELEASE Left     COLONOSCOPY      EXTERNAL EAR SURGERY Bilateral 01/01/1959    FEMUR SURGERY Right 03/28/2025    FEMUR CEPHALOMEDULLARY NAIL    FEMUR SURGERY Right 03/28/2025    FEMUR CEPHALOMEDULLARY NAIL (C-ARM, SYNTHES, STERILE TRACTION) - Right    HERNIA 
  Orthopaedic Surgery Consult  (Dr. Madsen)      Time of Evaluation: 4:00pm    CC/Reason for consult:  Right femur fracture    HPI:      The patient is a 84 y.o.  male who earlier today fell from standing height and was found down in his bathroom. Currently patient is confused and is only AOx1. Unable to answer questions appropriately. No family at bedside. Unclear if he hit his head when he fell. Per chart review patient was initially seen at [facility] and was transferred for further evaluation. Currently only complaining of pain to the right hip. States that he lives at home and his wife takes care of him. PMH includes DM, CAD, TIA, HTN, pacemaker placement 1999, on eliquis at baseline. He states he does use a walker for ambulation at baseline.     Past Medical History:    Past Medical History:   Diagnosis Date    CAD (coronary artery disease)     Cataract     BILAT.    Diabetes mellitus (HCC)     Goiter, nontoxic, multinodular     Hearing loss, bilateral     [place] (hard of hearing)     BILAT. HEARING AIDS    Hyperlipidemia     Hypertension     Mediastinal mass 2/14/14    MEDIASTINOSCOPY    Osteoarthritis     Peptic ulcer     TIA (transient ischemic attack)     Wears glasses     Wears partial dentures     UPPER     Past Surgical History:    Past Surgical History:   Procedure Laterality Date    BACK SURGERY  2007    LUMBAR LAMI    CARDIAC CATHETERIZATION  2000,2003,2009    CARDIAC SURGERY      CARPAL TUNNEL RELEASE Left     COLONOSCOPY      EXTERNAL EAR SURGERY Bilateral 1959    HERNIA REPAIR      UMBILICAL    MASTOID SURGERY  1959    MEDIASTINOSCOPY  2/4/14    PACEMAKER PLACEMENT  1999    replaced 2010;  DR. ANDERS    SINUS SURGERY      TONSILLECTOMY      VASECTOMY       Medications Prior to Admission:   Prior to Admission medications    Medication Sig Start Date End Date Taking? Authorizing Provider   Multiple Vitamin (MULTIVITAMIN) tablet  3/12/25  Yes Provider, MD Steffi   magnesium oxide (MAG-OX) 400 
[facility]    Consult               Today's Date: 3/28/2025  Patient Name: Brandon Smart  Date of admission: 3/27/2025  3:32 PM  Patient's age: 84 y.o., 1940  Admission Dx: Closed comminuted intertrochanteric fracture of proximal femur, right, initial encounter [S72.141A]  Closed comminuted intertrochanteric fracture of proximal end of right femur, initial encounter [S72.141A]  Pneumonia of right lower lobe due to infectious organism [J18.9]    Requesting Physician: Noble John MD    Cardiac Evaluation Reason:  Pre-op risk stratification    History Obtained From: patient and chart review     History of Present Illness:    This patient 84 y.o. year old male with PMHx: CAD, sick sinus syndrome s/p pacemaker, history of TIA, DM, HTN, HLD, alzheimer's dementia who presented to the ED after a fall. Was found to have a right intertrochanteric femur fracture and is scheduled for surgical intervention with orthopedic surgery today.     He is unable to answer question appropriately due to his alzheimer's dementia. He is only alert to self.     In ED patient was hemodynamically stable with high blood pressure readings which have remained elevated. Troponins were 43 trended to 38 with baseline in the 20s. EKG showed a ventricular paced rhythm. BNP was 19,430 but chest xray did not demonstrate any pulmonary vascular congestion and patient does not have any pitting edema of LE. CT of thorax did show bibasilar opacities right greater than left. CBC did show anemia with hemoglobin at 8.9 but seems to be chronic problem for him and BMP was WNL except for hyperglycemia. Myoglobin elevated at 137.    Past Medical History:   has a past medical history of CAD (coronary artery disease), Cataract, Diabetes mellitus (HCC), Goiter, nontoxic, multinodular, Hearing loss, bilateral, [place] (hard of hearing), Hyperlipidemia, Hypertension, Mediastinal mass, Osteoarthritis, Peptic ulcer, TIA (transient ischemic 
appropriate clinical setting. 5.  Moderate-sized right pleural effusion.  Bibasilar infiltrates most pronounced on the right concerning for multifocal pneumonia.  Imaging follow-up recommended to document resolution.     CT THORACIC SPINE WO CONTRAST  Result Date: 3/27/2025  1.  No acute intracranial abnormality.  Streaky hyperattenuation within the cerebellar hemispheres is likely artifactual or related to nonspecific parenchymal mineralization. 2.  No acute cervical spine fracture. 3.  No acute thoracolumbar spine fracture.  Chronic L5 compression fracture deformity. 4.  Defects within the temporal bones bilaterally possibly related to prior mastoidectomies though clinical correlation recommended.  Scattered mastoid air cell effusions as well as fluid within the middle ear cavities bilaterally may reflect otomastoiditis in the appropriate clinical setting. 5.  Moderate-sized right pleural effusion.  Bibasilar infiltrates most pronounced on the right concerning for multifocal pneumonia.  Imaging follow-up recommended to document resolution.     XR HIP BILATERAL W AP PELVIS (2 VIEWS)  Result Date: 3/27/2025  1. Mildly displaced right intertrochanteric fracture. 2. Moderate osteoarthritic changes of the right hip.     XR CHEST PORTABLE  Result Date: 3/27/2025  Cardiomegaly Right basilar opacity could represent subsegmental atelectasis or infection        Physical Exam:  BP (!) 150/77   Pulse 68   Temp 97.5 °F (36.4 °C) (Temporal)   Resp 12   Ht 1.651 m (5' 5\")   Wt 61.2 kg (135 lb)   SpO2 98%   BMI 22.47 kg/m²     Could not assess    Impression:    Right intertrochanteric femur fracture secondary to fall from standing height: OR today, planning for postop ICU admission.  CHF, elevated BNP to 19,000: Pacemaker in place, cardiology consulted  Sick sinus syndrome  Hypertension  Type 2 diabetes  Iron deficiency anemia  Orthostatic dizziness  Alzheimer dementia    Recommendations:    Diagnosis: Right hip 
spine fracture.  Chronic L5 compression fracture deformity. 4.  Defects within the temporal bones bilaterally possibly related to prior mastoidectomies though clinical correlation recommended.  Scattered mastoid air cell effusions as well as fluid within the middle ear cavities bilaterally may reflect otomastoiditis in the appropriate clinical setting. 5.  Moderate-sized right pleural effusion.  Bibasilar infiltrates most pronounced on the right concerning for multifocal pneumonia.  Imaging follow-up recommended to document resolution.     CT THORACIC SPINE WO CONTRAST  Result Date: 3/27/2025  1.  No acute intracranial abnormality.  Streaky hyperattenuation within the cerebellar hemispheres is likely artifactual or related to nonspecific parenchymal mineralization. 2.  No acute cervical spine fracture. 3.  No acute thoracolumbar spine fracture.  Chronic L5 compression fracture deformity. 4.  Defects within the temporal bones bilaterally possibly related to prior mastoidectomies though clinical correlation recommended.  Scattered mastoid air cell effusions as well as fluid within the middle ear cavities bilaterally may reflect otomastoiditis in the appropriate clinical setting. 5.  Moderate-sized right pleural effusion.  Bibasilar infiltrates most pronounced on the right concerning for multifocal pneumonia.  Imaging follow-up recommended to document resolution.     XR HIP BILATERAL W AP PELVIS (2 VIEWS)  Result Date: 3/27/2025  1. Mildly displaced right intertrochanteric fracture. 2. Moderate osteoarthritic changes of the right hip.     XR CHEST PORTABLE  Result Date: 3/27/2025  Cardiomegaly Right basilar opacity could represent subsegmental atelectasis or infection       Assessment :      Hospital Problems           Last Modified POA    * (Principal) Closed comminuted intertrochanteric fracture of proximal femur, right, initial encounter ([facility]) 3/28/2025 Yes    Iron deficiency anemia 3/28/2025 Yes    Type 2 diabetes

## 2025-03-31 NOTE — PLAN OF CARE
Problem: Safety - Adult  Goal: Free from fall injury  3/30/2025 2202 by Dayanara Ogden RN  Outcome: Progressing     Problem: Chronic Conditions and Co-morbidities  Goal: Patient's chronic conditions and co-morbidity symptoms are monitored and maintained or improved  3/30/2025 2202 by Dayanara Ogden RN  Outcome: Progressing     Problem: Discharge Planning  Goal: Discharge to home or other facility with appropriate resources  3/30/2025 2202 by Dayanara Ogden RN  Outcome: Progressing     Problem: Skin/Tissue Integrity  Goal: Skin integrity remains intact  Description: 1.  Monitor for areas of redness and/or skin breakdown  2.  Assess vascular access sites hourly  3.  Every 4-6 hours minimum:  Change oxygen saturation probe site  4.  Every 4-6 hours:  If on nasal continuous positive airway pressure, respiratory therapy assess nares and determine need for appliance change or resting period  3/30/2025 2202 by Dayanara Ogden RN  Outcome: Progressing     Problem: ABCDS Injury Assessment  Goal: Absence of physical injury  3/30/2025 2202 by Dayanara Ogden RN  Outcome: Progressing     Problem: Confusion  Goal: Confusion, delirium, dementia, or psychosis is improved or at baseline  Description: INTERVENTIONS:  1. Assess for possible contributors to thought disturbance, including medications, impaired vision or hearing, underlying metabolic abnormalities, dehydration, psychiatric diagnoses, and notify attending LIP  2. [place] high risk fall precautions, as indicated  3. Provide frequent short contacts to provide reality reorientation, refocusing and direction  4. Decrease environmental stimuli, including noise as appropriate  5. Monitor and intervene to maintain adequate nutrition, hydration, elimination, sleep and activity  6. If unable to ensure safety without constant attention obtain sitter and review sitter guidelines with assigned personnel  7. Initiate Psychosocial CNS and Spiritual Care consult, as

## 2025-03-31 NOTE — CARE COORDINATION
Case Management   Daily Progress Note       Patient Name: Brandon Smart                   YOB: 1940  Diagnosis: Closed comminuted intertrochanteric fracture of proximal femur, right, initial encounter [S72.141A]  Closed comminuted intertrochanteric fracture of proximal end of right femur, initial encounter [S72.141A]  Pneumonia of right lower lobe due to infectious organism [J18.9]                       GMLOS: 4.4 days  Length of Stay: 4  days    Anticipated Discharge Date: Ready for discharge    Readmission Risk (Low < 19, Mod (19-27), High > 27): Readmission Risk Score: 16.4        Current Transitional Plan    [] Home Independently    [] Home with HC    [x] Skilled Nursing Facility    [] Acute Rehabilitation    [] Long Term Acute Care (LTAC)    [] Other:     Plan for the Stay (Medical Management) :          Workflow Continuation (Additional Notes) :  1340- Spoke w daughter Patricia to get SNF choice: she is at work and has list in her car. Notified that pt has d/c order and SNF choice is needed ASAP. Gave her CM direct # and instructed to call w choice , states she will       GUNNAR AGUILAR  March 31, 2025

## 2025-04-01 LAB
ANION GAP SERPL CALCULATED.3IONS-SCNC: 9 MMOL/L (ref 9–16)
BASOPHILS # BLD: 0 K/UL (ref 0–0.2)
BASOPHILS NFR BLD: 0 % (ref 0–2)
BUN SERPL-MCNC: 31 MG/DL (ref 8–23)
CALCIUM SERPL-MCNC: 9 MG/DL (ref 8.6–10.4)
CHLORIDE SERPL-SCNC: 108 MMOL/L (ref 98–107)
CO2 SERPL-SCNC: 27 MMOL/L (ref 20–31)
CODEINE, URINE: <20 NG/ML
CREAT SERPL-MCNC: 1 MG/DL (ref 0.7–1.2)
EOSINOPHIL # BLD: 0.38 K/UL (ref 0–0.44)
EOSINOPHILS RELATIVE PERCENT: 4 % (ref 1–4)
ERYTHROCYTE [DISTWIDTH] IN BLOOD BY AUTOMATED COUNT: 13.5 % (ref 11.8–14.4)
GFR, ESTIMATED: 73 ML/MIN/1.73M2
GLUCOSE BLD-MCNC: 148 MG/DL (ref 75–110)
GLUCOSE BLD-MCNC: 180 MG/DL (ref 75–110)
GLUCOSE BLD-MCNC: 186 MG/DL (ref 75–110)
GLUCOSE BLD-MCNC: 190 MG/DL (ref 75–110)
GLUCOSE BLD-MCNC: 88 MG/DL (ref 75–110)
GLUCOSE SERPL-MCNC: 165 MG/DL (ref 74–99)
HCT VFR BLD AUTO: 23.2 % (ref 40.7–50.3)
HGB BLD-MCNC: 7.3 G/DL (ref 13–17)
HYDROCODONE, URINE CONFIRMATION: <20 NG/ML
HYDROMORPHONE, URINE CONFIRMATION: <20 NG/ML
IMM GRANULOCYTES # BLD AUTO: 0.28 K/UL (ref 0–0.3)
IMM GRANULOCYTES NFR BLD: 3 %
LYMPHOCYTES NFR BLD: 0.56 K/UL (ref 1.1–3.7)
LYMPHOCYTES RELATIVE PERCENT: 6 % (ref 24–43)
MCH RBC QN AUTO: 28.4 PG (ref 25.2–33.5)
MCHC RBC AUTO-ENTMCNC: 31.5 G/DL (ref 28.4–34.8)
MCV RBC AUTO: 90.3 FL (ref 82.6–102.9)
MONOCYTES NFR BLD: 0.85 K/UL (ref 0.1–1.2)
MONOCYTES NFR BLD: 9 % (ref 3–12)
MORPHINE, URINE CONFIRMATION: <20 NG/ML
MORPHOLOGY: NORMAL
NEUTROPHILS NFR BLD: 78 % (ref 36–65)
NEUTS SEG NFR BLD: 7.33 K/UL (ref 1.5–8.1)
NORHYDROCODONE, URINE: <20 NG/ML
NOROXYCODONE, URINE: 496 NG/ML
NOROXYMORPHONE, URINE: 21 NG/ML
NRBC BLD-RTO: 0 PER 100 WBC
OPIATE, 6-AM URINE: <10 NG/ML
OXYCODONE, URINE CONFIRMATION: 1384 NG/ML
OXYMORPHONE, URINE CONFIRMATION: <20 NG/ML
PLATELET # BLD AUTO: 187 K/UL (ref 138–453)
PMV BLD AUTO: 11.3 FL (ref 8.1–13.5)
POTASSIUM SERPL-SCNC: 3.6 MMOL/L (ref 3.7–5.3)
RBC # BLD AUTO: 2.57 M/UL (ref 4.21–5.77)
SODIUM SERPL-SCNC: 144 MMOL/L (ref 136–145)
WBC OTHER # BLD: 9.4 K/UL (ref 3.5–11.3)

## 2025-04-01 PROCEDURE — 99233 SBSQ HOSP IP/OBS HIGH 50: CPT | Performed by: NURSE PRACTITIONER

## 2025-04-01 PROCEDURE — 97110 THERAPEUTIC EXERCISES: CPT

## 2025-04-01 PROCEDURE — 6370000000 HC RX 637 (ALT 250 FOR IP)

## 2025-04-01 PROCEDURE — 80048 BASIC METABOLIC PNL TOTAL CA: CPT

## 2025-04-01 PROCEDURE — 36415 COLL VENOUS BLD VENIPUNCTURE: CPT

## 2025-04-01 PROCEDURE — 99232 SBSQ HOSP IP/OBS MODERATE 35: CPT | Performed by: SURGERY

## 2025-04-01 PROCEDURE — 6360000002 HC RX W HCPCS

## 2025-04-01 PROCEDURE — 99232 SBSQ HOSP IP/OBS MODERATE 35: CPT | Performed by: INTERNAL MEDICINE

## 2025-04-01 PROCEDURE — 6370000000 HC RX 637 (ALT 250 FOR IP): Performed by: STUDENT IN AN ORGANIZED HEALTH CARE EDUCATION/TRAINING PROGRAM

## 2025-04-01 PROCEDURE — 6370000000 HC RX 637 (ALT 250 FOR IP): Performed by: HOSPITALIST

## 2025-04-01 PROCEDURE — 97530 THERAPEUTIC ACTIVITIES: CPT

## 2025-04-01 PROCEDURE — 2060000000 HC ICU INTERMEDIATE R&B

## 2025-04-01 PROCEDURE — 2500000003 HC RX 250 WO HCPCS

## 2025-04-01 PROCEDURE — 97535 SELF CARE MNGMENT TRAINING: CPT

## 2025-04-01 PROCEDURE — 85025 COMPLETE CBC W/AUTO DIFF WBC: CPT

## 2025-04-01 PROCEDURE — 6370000000 HC RX 637 (ALT 250 FOR IP): Performed by: NURSE PRACTITIONER

## 2025-04-01 PROCEDURE — 51798 US URINE CAPACITY MEASURE: CPT

## 2025-04-01 RX ORDER — LISINOPRIL 20 MG/1
40 TABLET ORAL DAILY
Status: DISCONTINUED | OUTPATIENT
Start: 2025-04-02 | End: 2025-04-03 | Stop reason: HOSPADM

## 2025-04-01 RX ORDER — SENNA AND DOCUSATE SODIUM 50; 8.6 MG/1; MG/1
2 TABLET, FILM COATED ORAL DAILY
Status: DISCONTINUED | OUTPATIENT
Start: 2025-04-01 | End: 2025-04-03 | Stop reason: HOSPADM

## 2025-04-01 RX ORDER — OXYCODONE HYDROCHLORIDE 5 MG/1
2.5 TABLET ORAL EVERY 6 HOURS PRN
Status: DISCONTINUED | OUTPATIENT
Start: 2025-04-01 | End: 2025-04-03 | Stop reason: HOSPADM

## 2025-04-01 RX ORDER — LISINOPRIL 40 MG/1
40 TABLET ORAL DAILY
Qty: 30 TABLET | Refills: 3
Start: 2025-04-02

## 2025-04-01 RX ORDER — SENNA AND DOCUSATE SODIUM 50; 8.6 MG/1; MG/1
2 TABLET, FILM COATED ORAL DAILY
Qty: 60 TABLET | Refills: 0
Start: 2025-04-02

## 2025-04-01 RX ADMIN — DONEPEZIL HYDROCHLORIDE 10 MG: 10 TABLET, FILM COATED ORAL at 21:01

## 2025-04-01 RX ADMIN — CYANOCOBALAMIN TAB 500 MCG 1000 MCG: 500 TAB at 07:48

## 2025-04-01 RX ADMIN — TAMSULOSIN HYDROCHLORIDE 0.4 MG: 0.4 CAPSULE ORAL at 07:47

## 2025-04-01 RX ADMIN — Medication 400 MG: at 21:02

## 2025-04-01 RX ADMIN — FERROUS SULFATE TAB EC 325 MG (65 MG FE EQUIVALENT) 325 MG: 325 (65 FE) TABLET DELAYED RESPONSE at 07:47

## 2025-04-01 RX ADMIN — ASPIRIN 81 MG: 81 TABLET, COATED ORAL at 07:47

## 2025-04-01 RX ADMIN — MEMANTINE 10 MG: 5 TABLET ORAL at 21:01

## 2025-04-01 RX ADMIN — CARVEDILOL 37.5 MG: 25 TABLET, FILM COATED ORAL at 07:47

## 2025-04-01 RX ADMIN — HEPARIN SODIUM 5000 UNITS: 5000 INJECTION INTRAVENOUS; SUBCUTANEOUS at 12:20

## 2025-04-01 RX ADMIN — CLONIDINE HYDROCHLORIDE 0.1 MG: 0.1 TABLET ORAL at 07:48

## 2025-04-01 RX ADMIN — ATORVASTATIN CALCIUM 10 MG: 20 TABLET, FILM COATED ORAL at 07:48

## 2025-04-01 RX ADMIN — OXYCODONE HYDROCHLORIDE AND ACETAMINOPHEN 500 MG: 500 TABLET ORAL at 07:48

## 2025-04-01 RX ADMIN — POLYETHYLENE GLYCOL 3350 17 G: 17 POWDER, FOR SOLUTION ORAL at 07:47

## 2025-04-01 RX ADMIN — INSULIN LISPRO 4 UNITS: 100 INJECTION, SOLUTION INTRAVENOUS; SUBCUTANEOUS at 17:13

## 2025-04-01 RX ADMIN — FINASTERIDE 5 MG: 5 TABLET, FILM COATED ORAL at 07:47

## 2025-04-01 RX ADMIN — INSULIN LISPRO 4 UNITS: 100 INJECTION, SOLUTION INTRAVENOUS; SUBCUTANEOUS at 12:20

## 2025-04-01 RX ADMIN — CEFDINIR 300 MG: 300 CAPSULE ORAL at 10:09

## 2025-04-01 RX ADMIN — CLONIDINE HYDROCHLORIDE 0.1 MG: 0.1 TABLET ORAL at 12:20

## 2025-04-01 RX ADMIN — AMLODIPINE BESYLATE 10 MG: 10 TABLET ORAL at 07:48

## 2025-04-01 RX ADMIN — HEPARIN SODIUM 5000 UNITS: 5000 INJECTION INTRAVENOUS; SUBCUTANEOUS at 06:21

## 2025-04-01 RX ADMIN — SODIUM CHLORIDE, PRESERVATIVE FREE 10 ML: 5 INJECTION INTRAVENOUS at 21:03

## 2025-04-01 RX ADMIN — HEPARIN SODIUM 5000 UNITS: 5000 INJECTION INTRAVENOUS; SUBCUTANEOUS at 21:03

## 2025-04-01 RX ADMIN — CARVEDILOL 37.5 MG: 25 TABLET, FILM COATED ORAL at 17:14

## 2025-04-01 RX ADMIN — CLONIDINE HYDROCHLORIDE 0.1 MG: 0.1 TABLET ORAL at 21:01

## 2025-04-01 RX ADMIN — Medication 400 MG: at 07:48

## 2025-04-01 RX ADMIN — SENNOSIDES, DOCUSATE SODIUM 2 TABLET: 50; 8.6 TABLET, FILM COATED ORAL at 07:47

## 2025-04-01 RX ADMIN — Medication 2000 UNITS: at 07:47

## 2025-04-01 RX ADMIN — MEMANTINE 10 MG: 5 TABLET ORAL at 07:48

## 2025-04-01 RX ADMIN — CEFDINIR 300 MG: 300 CAPSULE ORAL at 21:01

## 2025-04-01 RX ADMIN — SODIUM CHLORIDE, PRESERVATIVE FREE 10 ML: 5 INJECTION INTRAVENOUS at 07:49

## 2025-04-01 RX ADMIN — ACETAMINOPHEN 1000 MG: 500 TABLET ORAL at 06:20

## 2025-04-01 RX ADMIN — LISINOPRIL 20 MG: 20 TABLET ORAL at 07:47

## 2025-04-01 RX ADMIN — ACETAMINOPHEN 1000 MG: 500 TABLET ORAL at 21:02

## 2025-04-01 RX ADMIN — ACETAMINOPHEN 1000 MG: 500 TABLET ORAL at 12:20

## 2025-04-01 ASSESSMENT — PAIN SCALES - GENERAL
PAINLEVEL_OUTOF10: 1
PAINLEVEL_OUTOF10: 1

## 2025-04-01 NOTE — PLAN OF CARE
Problem: Safety - Adult  Goal: Free from fall injury  Outcome: Progressing     Problem: Chronic Conditions and Co-morbidities  Goal: Patient's chronic conditions and co-morbidity symptoms are monitored and maintained or improved  Outcome: Progressing     Problem: Discharge Planning  Goal: Discharge to home or other facility with appropriate resources  Outcome: Progressing     Problem: Skin/Tissue Integrity  Goal: Skin integrity remains intact  Description: 1.  Monitor for areas of redness and/or skin breakdown  2.  Assess vascular access sites hourly  3.  Every 4-6 hours minimum:  Change oxygen saturation probe site  4.  Every 4-6 hours:  If on nasal continuous positive airway pressure, respiratory therapy assess nares and determine need for appliance change or resting period  Outcome: Progressing     Problem: ABCDS Injury Assessment  Goal: Absence of physical injury  Outcome: Progressing     Problem: Confusion  Goal: Confusion, delirium, dementia, or psychosis is improved or at baseline  Description: INTERVENTIONS:  1. Assess for possible contributors to thought disturbance, including medications, impaired vision or hearing, underlying metabolic abnormalities, dehydration, psychiatric diagnoses, and notify attending LIP  2. [place] high risk fall precautions, as indicated  3. Provide frequent short contacts to provide reality reorientation, refocusing and direction  4. Decrease environmental stimuli, including noise as appropriate  5. Monitor and intervene to maintain adequate nutrition, hydration, elimination, sleep and activity  6. If unable to ensure safety without constant attention obtain sitter and review sitter guidelines with assigned personnel  7. Initiate Psychosocial CNS and Spiritual Care consult, as indicated  Outcome: Progressing     Problem: Pain  Goal: Verbalizes/displays adequate comfort level or baseline comfort level  Outcome: Progressing

## 2025-04-01 NOTE — PLAN OF CARE
Problem: Safety - Adult  Goal: Free from fall injury  Outcome: Progressing     Problem: Chronic Conditions and Co-morbidities  Goal: Patient's chronic conditions and co-morbidity symptoms are monitored and maintained or improved  Outcome: Progressing     Problem: Discharge Planning  Goal: Discharge to home or other facility with appropriate resources  Outcome: Progressing     Problem: Skin/Tissue Integrity  Goal: Skin integrity remains intact  Description: 1.  Monitor for areas of redness and/or skin breakdown  2.  Assess vascular access sites hourly  3.  Every 4-6 hours minimum:  Change oxygen saturation probe site  4.  Every 4-6 hours:  If on nasal continuous positive airway pressure, respiratory therapy assess nares and determine need for appliance change or resting period  Outcome: Progressing     Problem: ABCDS Injury Assessment  Goal: Absence of physical injury  Outcome: Progressing     Problem: Confusion  Goal: Confusion, delirium, dementia, or psychosis is improved or at baseline  Description: INTERVENTIONS:  1. Assess for possible contributors to thought disturbance, including medications, impaired vision or hearing, underlying metabolic abnormalities, dehydration, psychiatric diagnoses, and notify attending LIP  2. [place] high risk fall precautions, as indicated  3. Provide frequent short contacts to provide reality reorientation, refocusing and direction  4. Decrease environmental stimuli, including noise as appropriate  5. Monitor and intervene to maintain adequate nutrition, hydration, elimination, sleep and activity  6. If unable to ensure safety without constant attention obtain sitter and review sitter guidelines with assigned personnel  7. Initiate Psychosocial CNS and Spiritual Care consult, as indicated  Outcome: Progressing     Problem: Pain  Goal: Verbalizes/displays adequate comfort level or baseline comfort level  Outcome: Progressing

## 2025-04-01 NOTE — CARE COORDINATION
Case Management   Daily Progress Note       Patient Name: Brandon Smart                   YOB: 1940  Diagnosis: Closed comminuted intertrochanteric fracture of proximal femur, right, initial encounter [S72.141A]  Closed comminuted intertrochanteric fracture of proximal end of right femur, initial encounter [S72.141A]  Pneumonia of right lower lobe due to infectious organism [J18.9]                       GMLOS: 4.4 days  Length of Stay: 5  days    Anticipated Discharge Date: Ready for discharge    Readmission Risk (Low < 19, Mod (19-27), High > 27): Readmission Risk Score: 16.5        Current Transitional Plan    [] Home Independently    [] Home with HC    [x] Skilled Nursing Facility    [] Acute Rehabilitation    [] Long Term Acute Care (LTAC)    [] Other:     Plan for the Stay (Medical Management) :          Workflow Continuation (Additional Notes) :    0955 PC to pt's daughter, Patricia, left VM requesting a call back regarding snf choices.     1030 VM from pt's daughter Patricia gave choices:  [facility] ([place])   [facility]  Referrals placed, pt's family will be here later today.       1210 VM from Tania at [facility], can accept if they are facility of choice.  [phone].     1230 PC from pt's daughter Patricia, discussed [facility] accepted, verbalized understanding.     1235 PC to Tania @ [facility], confirmed acceptance and CM to start auth.      1635 Precert can't be started on Ruddy, PC to Tania at [facility], she will start precert.     INGRIS MARROQUIN  April 1, 2025

## 2025-04-01 NOTE — DISCHARGE INSTR - COC
Continuity of Care Form    Patient Name: Brandon Smart   :  1940  MRN:  [id number]    Admit date:  3/27/2025  Discharge date:  4/3/25    Code Status Order: DNR-CCA   Advance Directives:    Date/Time Healthcare Directive Type of Healthcare Directive Copy in Chart Healthcare Agent Appointed Healthcare Agent's Name Healthcare Agent's Phone Number    [phone] No, patient does not have an advance directive for healthcare treatment  --  --  --  --  --             Admitting Physician:  Noble John MD  PCP: Ruth Ann Richmond, KENN - CNP    Discharging Nurse: Karina  [facility] Unit/Room#: 0425/0425-01  Discharging Unit Phone Number: [phone]    Emergency Contact:   Extended Emergency Contact Information  Primary Emergency Contact: Patricia Chawla  Home Phone: [phone]  Mobile Phone: [phone]  Relation: Child   needed? No  Secondary Emergency Contact: brittanyshaun  Home Phone: [phone]  Mobile Phone: [phone]  Relation: Child    Past Surgical History:  Past Surgical History:   Procedure Laterality Date    BACK SURGERY  2007    LUMBAR LAMI    CARDIAC CATHETERIZATION  ,,    CARDIAC SURGERY      CARPAL TUNNEL RELEASE Left     COLONOSCOPY      EXTERNAL EAR SURGERY Bilateral 1959    FEMUR SURGERY Right 2025    FEMUR CEPHALOMEDULLARY NAIL    FEMUR SURGERY Right 2025    FEMUR CEPHALOMEDULLARY NAIL (C-ARM, SYNTHES, STERILE TRACTION) - Right    FEMUR SURGERY Right 3/28/2025    FEMUR CEPHALOMEDULLARY NAIL (C-ARM, SYNTHES, STERILE TRACTION) performed by Mayo Madsen DO at [facility] OR    HERNIA REPAIR      UMBILICAL    MASTOID SURGERY  1959    MEDIASTINOSCOPY  2014    PACEMAKER PLACEMENT  1999    replaced ;  DR. ANDERS    SINUS SURGERY      TONSILLECTOMY      VASECTOMY         Immunization History:     There is no immunization history on file for this patient.    Active Problems:  Patient Active Problem List

## 2025-04-02 LAB
ANION GAP SERPL CALCULATED.3IONS-SCNC: 11 MMOL/L (ref 9–16)
BASOPHILS # BLD: 0 K/UL (ref 0–0.2)
BASOPHILS NFR BLD: 0 % (ref 0–2)
BUN SERPL-MCNC: 28 MG/DL (ref 8–23)
CALCIUM SERPL-MCNC: 8.9 MG/DL (ref 8.6–10.4)
CHLORIDE SERPL-SCNC: 105 MMOL/L (ref 98–107)
CO2 SERPL-SCNC: 26 MMOL/L (ref 20–31)
CREAT SERPL-MCNC: 0.9 MG/DL (ref 0.7–1.2)
EOSINOPHIL # BLD: 0.44 K/UL (ref 0–0.4)
EOSINOPHILS RELATIVE PERCENT: 5 % (ref 1–4)
ERYTHROCYTE [DISTWIDTH] IN BLOOD BY AUTOMATED COUNT: 13.5 % (ref 11.8–14.4)
GFR, ESTIMATED: 83 ML/MIN/1.73M2
GLUCOSE BLD-MCNC: 148 MG/DL (ref 75–110)
GLUCOSE BLD-MCNC: 152 MG/DL (ref 75–110)
GLUCOSE BLD-MCNC: 157 MG/DL (ref 75–110)
GLUCOSE BLD-MCNC: 221 MG/DL (ref 75–110)
GLUCOSE BLD-MCNC: 227 MG/DL (ref 75–110)
GLUCOSE SERPL-MCNC: 174 MG/DL (ref 74–99)
HCT VFR BLD AUTO: 25.7 % (ref 40.7–50.3)
HGB BLD-MCNC: 7.7 G/DL (ref 13–17)
IMM GRANULOCYTES # BLD AUTO: 0 K/UL (ref 0–0.3)
IMM GRANULOCYTES NFR BLD: 0 %
LYMPHOCYTES NFR BLD: 0.53 K/UL (ref 1–4.8)
LYMPHOCYTES RELATIVE PERCENT: 6 % (ref 24–44)
MCH RBC QN AUTO: 27.9 PG (ref 25.2–33.5)
MCHC RBC AUTO-ENTMCNC: 30 G/DL (ref 28.4–34.8)
MCV RBC AUTO: 93.1 FL (ref 82.6–102.9)
MONOCYTES NFR BLD: 0.62 K/UL (ref 0.1–0.8)
MONOCYTES NFR BLD: 7 % (ref 1–7)
MORPHOLOGY: NORMAL
NEUTROPHILS NFR BLD: 82 % (ref 36–66)
NEUTS SEG NFR BLD: 7.21 K/UL (ref 1.8–7.7)
NRBC BLD-RTO: 0 PER 100 WBC
NUCLEATED RED BLOOD CELLS: 1 PER 100 WBC
PLATELET # BLD AUTO: 214 K/UL (ref 138–453)
PMV BLD AUTO: 11.3 FL (ref 8.1–13.5)
POTASSIUM SERPL-SCNC: 3.6 MMOL/L (ref 3.7–5.3)
RBC # BLD AUTO: 2.76 M/UL (ref 4.21–5.77)
SODIUM SERPL-SCNC: 142 MMOL/L (ref 136–145)
WBC OTHER # BLD: 8.8 K/UL (ref 3.5–11.3)

## 2025-04-02 PROCEDURE — 85025 COMPLETE CBC W/AUTO DIFF WBC: CPT

## 2025-04-02 PROCEDURE — 97110 THERAPEUTIC EXERCISES: CPT

## 2025-04-02 PROCEDURE — 6370000000 HC RX 637 (ALT 250 FOR IP)

## 2025-04-02 PROCEDURE — 6360000002 HC RX W HCPCS

## 2025-04-02 PROCEDURE — 6370000000 HC RX 637 (ALT 250 FOR IP): Performed by: STUDENT IN AN ORGANIZED HEALTH CARE EDUCATION/TRAINING PROGRAM

## 2025-04-02 PROCEDURE — 6370000000 HC RX 637 (ALT 250 FOR IP): Performed by: HOSPITALIST

## 2025-04-02 PROCEDURE — 2500000003 HC RX 250 WO HCPCS

## 2025-04-02 PROCEDURE — 36415 COLL VENOUS BLD VENIPUNCTURE: CPT

## 2025-04-02 PROCEDURE — 51701 INSERT BLADDER CATHETER: CPT

## 2025-04-02 PROCEDURE — 6370000000 HC RX 637 (ALT 250 FOR IP): Performed by: NURSE PRACTITIONER

## 2025-04-02 PROCEDURE — 97116 GAIT TRAINING THERAPY: CPT

## 2025-04-02 PROCEDURE — 80048 BASIC METABOLIC PNL TOTAL CA: CPT

## 2025-04-02 PROCEDURE — 2060000000 HC ICU INTERMEDIATE R&B

## 2025-04-02 PROCEDURE — 97530 THERAPEUTIC ACTIVITIES: CPT

## 2025-04-02 PROCEDURE — 82947 ASSAY GLUCOSE BLOOD QUANT: CPT

## 2025-04-02 PROCEDURE — 51798 US URINE CAPACITY MEASURE: CPT

## 2025-04-02 PROCEDURE — 99232 SBSQ HOSP IP/OBS MODERATE 35: CPT | Performed by: INTERNAL MEDICINE

## 2025-04-02 RX ADMIN — INSULIN LISPRO 4 UNITS: 100 INJECTION, SOLUTION INTRAVENOUS; SUBCUTANEOUS at 16:57

## 2025-04-02 RX ADMIN — INSULIN LISPRO 4 UNITS: 100 INJECTION, SOLUTION INTRAVENOUS; SUBCUTANEOUS at 12:27

## 2025-04-02 RX ADMIN — SENNOSIDES, DOCUSATE SODIUM 2 TABLET: 50; 8.6 TABLET, FILM COATED ORAL at 09:46

## 2025-04-02 RX ADMIN — ACETAMINOPHEN 1000 MG: 500 TABLET ORAL at 05:45

## 2025-04-02 RX ADMIN — CEFDINIR 300 MG: 300 CAPSULE ORAL at 22:19

## 2025-04-02 RX ADMIN — AMLODIPINE BESYLATE 10 MG: 10 TABLET ORAL at 09:45

## 2025-04-02 RX ADMIN — MEMANTINE 10 MG: 5 TABLET ORAL at 22:19

## 2025-04-02 RX ADMIN — TAMSULOSIN HYDROCHLORIDE 0.4 MG: 0.4 CAPSULE ORAL at 09:46

## 2025-04-02 RX ADMIN — CYANOCOBALAMIN TAB 500 MCG 1000 MCG: 500 TAB at 09:46

## 2025-04-02 RX ADMIN — ACETAMINOPHEN 1000 MG: 500 TABLET ORAL at 22:20

## 2025-04-02 RX ADMIN — DONEPEZIL HYDROCHLORIDE 10 MG: 10 TABLET, FILM COATED ORAL at 22:20

## 2025-04-02 RX ADMIN — CLONIDINE HYDROCHLORIDE 0.1 MG: 0.1 TABLET ORAL at 09:46

## 2025-04-02 RX ADMIN — CARVEDILOL 37.5 MG: 25 TABLET, FILM COATED ORAL at 16:57

## 2025-04-02 RX ADMIN — Medication 400 MG: at 22:19

## 2025-04-02 RX ADMIN — ASPIRIN 81 MG: 81 TABLET, COATED ORAL at 09:45

## 2025-04-02 RX ADMIN — OXYCODONE HYDROCHLORIDE AND ACETAMINOPHEN 500 MG: 500 TABLET ORAL at 09:46

## 2025-04-02 RX ADMIN — ATORVASTATIN CALCIUM 10 MG: 20 TABLET, FILM COATED ORAL at 09:46

## 2025-04-02 RX ADMIN — CLONIDINE HYDROCHLORIDE 0.1 MG: 0.1 TABLET ORAL at 12:27

## 2025-04-02 RX ADMIN — HEPARIN SODIUM 5000 UNITS: 5000 INJECTION INTRAVENOUS; SUBCUTANEOUS at 05:45

## 2025-04-02 RX ADMIN — SODIUM CHLORIDE, PRESERVATIVE FREE 10 ML: 5 INJECTION INTRAVENOUS at 09:47

## 2025-04-02 RX ADMIN — SODIUM CHLORIDE, PRESERVATIVE FREE 10 ML: 5 INJECTION INTRAVENOUS at 22:20

## 2025-04-02 RX ADMIN — CARVEDILOL 37.5 MG: 25 TABLET, FILM COATED ORAL at 09:45

## 2025-04-02 RX ADMIN — LISINOPRIL 40 MG: 20 TABLET ORAL at 09:46

## 2025-04-02 RX ADMIN — Medication 400 MG: at 09:46

## 2025-04-02 RX ADMIN — HEPARIN SODIUM 5000 UNITS: 5000 INJECTION INTRAVENOUS; SUBCUTANEOUS at 12:32

## 2025-04-02 RX ADMIN — CEFDINIR 300 MG: 300 CAPSULE ORAL at 09:46

## 2025-04-02 RX ADMIN — MEMANTINE 10 MG: 5 TABLET ORAL at 09:46

## 2025-04-02 RX ADMIN — CLONIDINE HYDROCHLORIDE 0.1 MG: 0.1 TABLET ORAL at 22:20

## 2025-04-02 RX ADMIN — FINASTERIDE 5 MG: 5 TABLET, FILM COATED ORAL at 09:46

## 2025-04-02 RX ADMIN — ACETAMINOPHEN 1000 MG: 500 TABLET ORAL at 12:27

## 2025-04-02 RX ADMIN — POLYETHYLENE GLYCOL 3350 17 G: 17 POWDER, FOR SOLUTION ORAL at 09:48

## 2025-04-02 RX ADMIN — HEPARIN SODIUM 5000 UNITS: 5000 INJECTION INTRAVENOUS; SUBCUTANEOUS at 22:20

## 2025-04-02 RX ADMIN — Medication 2000 UNITS: at 09:45

## 2025-04-02 RX ADMIN — FERROUS SULFATE TAB EC 325 MG (65 MG FE EQUIVALENT) 325 MG: 325 (65 FE) TABLET DELAYED RESPONSE at 09:46

## 2025-04-02 ASSESSMENT — PAIN SCALES - GENERAL
PAINLEVEL_OUTOF10: 1
PAINLEVEL_OUTOF10: 0
PAINLEVEL_OUTOF10: 3

## 2025-04-02 NOTE — CARE COORDINATION
Case Management   Daily Progress Note       Patient Name: Brandon Smart                   YOB: 1940  Diagnosis: Closed comminuted intertrochanteric fracture of proximal femur, right, initial encounter [S72.141A]  Closed comminuted intertrochanteric fracture of proximal end of right femur, initial encounter [S72.141A]  Pneumonia of right lower lobe due to infectious organism [J18.9]                       GMLOS: 6.3 days  Length of Stay: 6  days    Anticipated Discharge Date: Ready for discharge    Readmission Risk (Low < 19, Mod (19-27), High > 27): Readmission Risk Score: 16        Current Transitional Plan    [] Home Independently    [] Home with HC    [x] Skilled Nursing Facility    [] Acute Rehabilitation    [] Long Term Acute Care (LTAC)    [] Other:     Plan for the Stay (Medical Management) :          Workflow Continuation (Additional Notes) :    Plan is [facility]. Facility has accepted and they started precert for this patient. Call placed to Tania in admissions to follow up on auth status and she states auth is pending. CELENA advised patient is ready for discharge as soon as auth approved.     1600: received call from ame with [facility] stating auth is approved. Spoke with [facility] and no transport times available this evening. Scheduled transport for noon tomorrow with jerome from [facility].     1645 HENS completed.     Emilie Lim RN  April 2, 2025

## 2025-04-02 NOTE — PLAN OF CARE
Problem: Safety - Adult  Goal: Free from fall injury  4/2/2025 0747 by Victor Hugo Rhodes RN  Outcome: Progressing  4/1/2025 1811 by Karina Devine RN  Outcome: Progressing     Problem: Chronic Conditions and Co-morbidities  Goal: Patient's chronic conditions and co-morbidity symptoms are monitored and maintained or improved  4/2/2025 0747 by Victor Hugo Rhodes RN  Outcome: Progressing  4/1/2025 1811 by Karina Devine RN  Outcome: Progressing     Problem: Discharge Planning  Goal: Discharge to home or other facility with appropriate resources  4/2/2025 0747 by Victor Hugo Rhodes RN  Outcome: Progressing  4/1/2025 1811 by Karina Devine RN  Outcome: Progressing     Problem: Skin/Tissue Integrity  Goal: Skin integrity remains intact  Description: 1.  Monitor for areas of redness and/or skin breakdown  2.  Assess vascular access sites hourly  3.  Every 4-6 hours minimum:  Change oxygen saturation probe site  4.  Every 4-6 hours:  If on nasal continuous positive airway pressure, respiratory therapy assess nares and determine need for appliance change or resting period  4/2/2025 0747 by Victor Hugo Rhodes RN  Outcome: Progressing  4/1/2025 1811 by Karina Devine RN  Outcome: Progressing     Problem: ABCDS Injury Assessment  Goal: Absence of physical injury  4/2/2025 0747 by Victor Hugo Rhodes RN  Outcome: Progressing  4/1/2025 1811 by Karina Devine RN  Outcome: Progressing     Problem: Confusion  Goal: Confusion, delirium, dementia, or psychosis is improved or at baseline  Description: INTERVENTIONS:  1. Assess for possible contributors to thought disturbance, including medications, impaired vision or hearing, underlying metabolic abnormalities, dehydration, psychiatric diagnoses, and notify attending LIP  2. [place] high risk fall precautions, as indicated  3. Provide frequent short contacts to provide reality reorientation, refocusing and direction  4. Decrease environmental stimuli, including noise as appropriate  5.

## 2025-04-02 NOTE — PLAN OF CARE
Problem: Safety - Adult  Goal: Free from fall injury  4/2/2025 1813 by Karina Devine RN  Outcome: Progressing  4/2/2025 0747 by Victor Hugo Rhodes RN  Outcome: Progressing     Problem: Chronic Conditions and Co-morbidities  Goal: Patient's chronic conditions and co-morbidity symptoms are monitored and maintained or improved  4/2/2025 1813 by Karina Devine RN  Outcome: Progressing  4/2/2025 0747 by Victor Hugo Rhodes RN  Outcome: Progressing     Problem: Discharge Planning  Goal: Discharge to home or other facility with appropriate resources  4/2/2025 1813 by Karina Devine RN  Outcome: Progressing  4/2/2025 0747 by Victor Hugo Rhodes RN  Outcome: Progressing     Problem: Skin/Tissue Integrity  Goal: Skin integrity remains intact  Description: 1.  Monitor for areas of redness and/or skin breakdown  2.  Assess vascular access sites hourly  3.  Every 4-6 hours minimum:  Change oxygen saturation probe site  4.  Every 4-6 hours:  If on nasal continuous positive airway pressure, respiratory therapy assess nares and determine need for appliance change or resting period  4/2/2025 1813 by Karina Devine RN  Outcome: Progressing  4/2/2025 0747 by Victor Hugo Rhodes RN  Outcome: Progressing     Problem: ABCDS Injury Assessment  Goal: Absence of physical injury  4/2/2025 1813 by Karina Devine RN  Outcome: Progressing  4/2/2025 0747 by Victor Hugo Rhodes RN  Outcome: Progressing     Problem: Confusion  Goal: Confusion, delirium, dementia, or psychosis is improved or at baseline  Description: INTERVENTIONS:  1. Assess for possible contributors to thought disturbance, including medications, impaired vision or hearing, underlying metabolic abnormalities, dehydration, psychiatric diagnoses, and notify attending LIP  2. [place] high risk fall precautions, as indicated  3. Provide frequent short contacts to provide reality reorientation, refocusing and direction  4. Decrease environmental stimuli, including noise as appropriate  5.

## 2025-04-03 VITALS
RESPIRATION RATE: 18 BRPM | BODY MASS INDEX: 22.49 KG/M2 | SYSTOLIC BLOOD PRESSURE: 166 MMHG | HEART RATE: 75 BPM | OXYGEN SATURATION: 95 % | WEIGHT: 135 LBS | DIASTOLIC BLOOD PRESSURE: 79 MMHG | HEIGHT: 65 IN | TEMPERATURE: 98.5 F

## 2025-04-03 LAB
GLUCOSE BLD-MCNC: 170 MG/DL (ref 75–110)
GLUCOSE BLD-MCNC: 190 MG/DL (ref 75–110)

## 2025-04-03 PROCEDURE — 6370000000 HC RX 637 (ALT 250 FOR IP)

## 2025-04-03 PROCEDURE — 82947 ASSAY GLUCOSE BLOOD QUANT: CPT

## 2025-04-03 PROCEDURE — 6360000002 HC RX W HCPCS

## 2025-04-03 PROCEDURE — 6370000000 HC RX 637 (ALT 250 FOR IP): Performed by: NURSE PRACTITIONER

## 2025-04-03 PROCEDURE — 99231 SBSQ HOSP IP/OBS SF/LOW 25: CPT | Performed by: INTERNAL MEDICINE

## 2025-04-03 PROCEDURE — 51798 US URINE CAPACITY MEASURE: CPT

## 2025-04-03 PROCEDURE — 51701 INSERT BLADDER CATHETER: CPT

## 2025-04-03 PROCEDURE — 2500000003 HC RX 250 WO HCPCS

## 2025-04-03 PROCEDURE — 6370000000 HC RX 637 (ALT 250 FOR IP): Performed by: STUDENT IN AN ORGANIZED HEALTH CARE EDUCATION/TRAINING PROGRAM

## 2025-04-03 PROCEDURE — 6370000000 HC RX 637 (ALT 250 FOR IP): Performed by: HOSPITALIST

## 2025-04-03 PROCEDURE — 51702 INSERT TEMP BLADDER CATH: CPT

## 2025-04-03 RX ORDER — AMLODIPINE BESYLATE 10 MG/1
10 TABLET ORAL DAILY
DISCHARGE
Start: 2025-04-04

## 2025-04-03 RX ORDER — CARVEDILOL 12.5 MG/1
37.5 TABLET ORAL 2 TIMES DAILY WITH MEALS
DISCHARGE
Start: 2025-04-03

## 2025-04-03 RX ADMIN — HYDRALAZINE HYDROCHLORIDE 10 MG: 20 INJECTION INTRAMUSCULAR; INTRAVENOUS at 12:12

## 2025-04-03 RX ADMIN — Medication 400 MG: at 08:47

## 2025-04-03 RX ADMIN — SODIUM CHLORIDE, PRESERVATIVE FREE 10 ML: 5 INJECTION INTRAVENOUS at 08:56

## 2025-04-03 RX ADMIN — HEPARIN SODIUM 5000 UNITS: 5000 INJECTION INTRAVENOUS; SUBCUTANEOUS at 05:53

## 2025-04-03 RX ADMIN — INSULIN LISPRO 4 UNITS: 100 INJECTION, SOLUTION INTRAVENOUS; SUBCUTANEOUS at 12:04

## 2025-04-03 RX ADMIN — ACETAMINOPHEN 1000 MG: 500 TABLET ORAL at 05:52

## 2025-04-03 RX ADMIN — LISINOPRIL 40 MG: 20 TABLET ORAL at 08:54

## 2025-04-03 RX ADMIN — TAMSULOSIN HYDROCHLORIDE 0.4 MG: 0.4 CAPSULE ORAL at 08:53

## 2025-04-03 RX ADMIN — FERROUS SULFATE TAB EC 325 MG (65 MG FE EQUIVALENT) 325 MG: 325 (65 FE) TABLET DELAYED RESPONSE at 08:56

## 2025-04-03 RX ADMIN — POLYETHYLENE GLYCOL 3350 17 G: 17 POWDER, FOR SOLUTION ORAL at 08:52

## 2025-04-03 RX ADMIN — MEMANTINE 10 MG: 5 TABLET ORAL at 08:48

## 2025-04-03 RX ADMIN — FINASTERIDE 5 MG: 5 TABLET, FILM COATED ORAL at 08:56

## 2025-04-03 RX ADMIN — CYANOCOBALAMIN TAB 500 MCG 1000 MCG: 500 TAB at 08:49

## 2025-04-03 RX ADMIN — CLONIDINE HYDROCHLORIDE 0.1 MG: 0.1 TABLET ORAL at 08:56

## 2025-04-03 RX ADMIN — OXYCODONE HYDROCHLORIDE AND ACETAMINOPHEN 500 MG: 500 TABLET ORAL at 08:55

## 2025-04-03 RX ADMIN — Medication 2000 UNITS: at 08:47

## 2025-04-03 RX ADMIN — CARVEDILOL 37.5 MG: 25 TABLET, FILM COATED ORAL at 08:49

## 2025-04-03 RX ADMIN — ACETAMINOPHEN 1000 MG: 500 TABLET ORAL at 12:05

## 2025-04-03 RX ADMIN — AMLODIPINE BESYLATE 10 MG: 10 TABLET ORAL at 08:53

## 2025-04-03 RX ADMIN — ASPIRIN 81 MG: 81 TABLET, COATED ORAL at 08:52

## 2025-04-03 RX ADMIN — CLONIDINE HYDROCHLORIDE 0.1 MG: 0.1 TABLET ORAL at 12:05

## 2025-04-03 RX ADMIN — SENNOSIDES, DOCUSATE SODIUM 2 TABLET: 50; 8.6 TABLET, FILM COATED ORAL at 08:51

## 2025-04-03 RX ADMIN — ATORVASTATIN CALCIUM 10 MG: 20 TABLET, FILM COATED ORAL at 08:52

## 2025-04-03 ASSESSMENT — PAIN SCALES - GENERAL
PAINLEVEL_OUTOF10: 0
PAINLEVEL_OUTOF10: 2

## 2025-04-03 NOTE — CARE COORDINATION
Discharge Report    [facility]  Clinical Case Management Department  Written by: Emilie Lim RN    Patient Name: Brandon Smart  Attending Provider: Noble John MD  Admit Date: 3/27/2025  3:32 PM  MRN: [id number]  Account: [id number]                     : 1940  Discharge Date: 4/3/25      Disposition: SNF    Plan is [facility] with 12:30pm stretcher  confirmed with danielle at [facility]. Notified josué at [facility] of  time. Faxed hever and hens to facility. Notified patient's daughter isa of above and agreeable. Patient agreeable to plan.     Emilie Lim RN

## 2025-04-03 NOTE — DISCHARGE SUMMARY
DISCHARGE SUMMARY:    PATIENT NAME:  Brandon Smart  YOB: 1940  MEDICAL RECORD NO. [id number]  DATE: 04/03/25  PRIMARY CARE PHYSICIAN: Ruth Ann Richmond APRN - CNP  ADMIT DATE:  3/27/2025    DISCHARGE DATE:  4/3/2025  DISPOSITION:  SNF  ADMITTING DIAGNOSIS:   fall    DIAGNOSIS:   Patient Active Problem List   Diagnosis    Mass of mediastinum    Bladder cancer ([facility])    Orthostatic dizziness    Iron deficiency anemia    Thrombocytopenia    Syncope and collapse    Pneumonia due to organism    Closed comminuted intertrochanteric fracture of proximal femur, right, initial encounter    Type 2 diabetes mellitus with hyperglycemia, without long-term current use of insulin ([facility])    Coronary artery disease involving native coronary artery of native heart without angina pectoris    Primary hypertension    Alzheimer dementia ([facility])    Pre-op evaluation    SSS (sick sinus syndrome) ([facility])       CONSULTANTS:  Ortho, Cardio, Uro, PMR    PROCEDURES:   3/28: OR-Right femur CMN    HOSPITAL COURSE:   Brandon Smart is a 84 y.o. male who was admitted on 3/27/2025  Hospital Course:  CC: FFSH, +LOC, +ASA    Injuries: Rt IT femur fx    Hospital Course:  3/27/2025: admit to stepdown  3/28: straight cath x1, OR with ortho for R femur CMN, EBL 300cc   3/29: straight cath x2, transfer, PVCs on EKG, hgb 6.6, transfused 1uPRBCs, post tx hgb 8.0  3/30: Increased Norvasc 10mg, difficulty voiding, PVR 500cc, farley placed, urology consulted  3/31: Home ASA resumed  4/1: void trial  4/2: straight cath x2  4/3: dc to SNF    Labs and imaging were followed.     On day of discharge Brandon Smart  was tolerating a regular diet  He was deemed medically stable for discharge to Skilled Facility        PHYSICAL EXAMINATION:        Discharge Vitals:  height is 1.651 m (5' 5\") and weight is 61.2 kg (135 lb). His oral temperature is 98.5 °F (36.9 °C). His blood pressure is 166/79 (abnormal) and his pulse is 75. His respiration is 18 and oxygen

## 2025-04-03 NOTE — PLAN OF CARE
Problem: Safety - Adult  Goal: Free from fall injury  4/3/2025 0005 by Victor Hugo Rhodes RN  Outcome: Progressing  4/2/2025 1813 by Karina Devine RN  Outcome: Progressing     Problem: Chronic Conditions and Co-morbidities  Goal: Patient's chronic conditions and co-morbidity symptoms are monitored and maintained or improved  4/3/2025 0005 by Victor Hugo Rhodes RN  Outcome: Progressing  4/2/2025 1813 by Karina Devine RN  Outcome: Progressing     Problem: Discharge Planning  Goal: Discharge to home or other facility with appropriate resources  4/3/2025 0005 by Victor Hugo Rhodes RN  Outcome: Progressing  4/2/2025 1813 by Karina Devine RN  Outcome: Progressing     Problem: Skin/Tissue Integrity  Goal: Skin integrity remains intact  Description: 1.  Monitor for areas of redness and/or skin breakdown  2.  Assess vascular access sites hourly  3.  Every 4-6 hours minimum:  Change oxygen saturation probe site  4.  Every 4-6 hours:  If on nasal continuous positive airway pressure, respiratory therapy assess nares and determine need for appliance change or resting period  4/3/2025 0005 by Victor Hugo Rhodes RN  Outcome: Progressing  4/2/2025 1813 by Karina Devine RN  Outcome: Progressing     Problem: ABCDS Injury Assessment  Goal: Absence of physical injury  4/3/2025 0005 by Victor Hugo Rhodes RN  Outcome: Progressing  4/2/2025 1813 by Karina Devine RN  Outcome: Progressing     Problem: Confusion  Goal: Confusion, delirium, dementia, or psychosis is improved or at baseline  Description: INTERVENTIONS:  1. Assess for possible contributors to thought disturbance, including medications, impaired vision or hearing, underlying metabolic abnormalities, dehydration, psychiatric diagnoses, and notify attending LIP  2. [place] high risk fall precautions, as indicated  3. Provide frequent short contacts to provide reality reorientation, refocusing and direction  4. Decrease environmental stimuli, including noise as appropriate  5.

## 2025-04-03 NOTE — DISCHARGE SUMMARY
Pt IV removed. Pt personal items packed and sent with pt. Pt left via stretcher to [facility] [place]. Writer attempted to call report and waited on hold for 10 minutes followed by an answer \"Hi, how are you?\" Then proceeded to hang up.

## 2025-04-03 NOTE — PROGRESS NOTES
Orthopedic Progress Note    Patient:  Brandon Smart  YOB: 1940     84 y.o. male    Subjective:  Patient seen and examined this morning.  Patient is demented and not oriented but alert.  Unable to respond coherently to questioning.  No complaints or concerns. No issues overnight per nursing other than blood pressure in the 160s. Pain appears well controlled on current regimen. Planning for OR with Dr. Madsen today pending cardiology clearance.     Vitals reviewed, afebrile    Objective:   Vitals:    03/28/25 0502   BP: (!) 166/92   Pulse: 75   Resp: 16   Temp:    SpO2: 93%     Gen: NAD, cooperative    Cardiovascular: Regular rate   Respiratory: No acute respiratory distress, breathing comfortably    Orthopedic Exam  RLE:  Skin intact. Compartments soft. 2+ DP/PT pulses with BCR. TA/EHL/FHL/GS motor intact. Unable to assess sensation due to mentation. Surgical limb marked.       Recent Labs     03/27/25  1140   WBC 8.0   HGB 8.9*   HCT 27.4*   *   INR 1.1      K 3.9   BUN 23   CREATININE 1.2   GLUCOSE 208*      Meds:   See rec for complete list    Impression 84 y.o. male who FFSH being seen for:    84 y.o. male who is being seen for:     -Right IT fracture     Plan:  - Plan for OR with Dr. Madsen today 3/28 pending cardiology clearance. Would appreciate recs.  - NWB to the RLE  - Diet: Please keep NPO in anticipation for OR   - Abx: Ancef OCTOR  - DVT ppx: Eliquis at baseline. Ok for ppx from ortho perspective.   - Consent in chart/surgical site marked. Consent obtained over phone with POA.  - Pain control and medical management per primary: Trauma Surgery  - Ice extremity for pain and swelling  - Work with PT/OT post op  - Please contact Ortho on call with any questions    Bandar Jordan DO  Orthopedic Surgery Resident, PGY-1  [place], [place]            
           Orthopedic Progress Note    Patient:  Brandon Smart  YOB: 1940     84 y.o. male    Subjective:  Patient seen and examined this morning. No complaints or concerns. No issues overnight per nursing. Pain is well controlled on current regimen. Denies fever, HA, CP, SOB, N/V, dysuria, new numbness/tingling. Voiding appropriately.  Patient was able to do bed mobility and limited transfers with physical therapy yesterday.    Vitals reviewed, afebrile    Objective:   Vitals:    03/30/25 0400   BP: (!) 120/55   Pulse: 72   Resp: 15   Temp:    SpO2: 97%     Gen: NAD, cooperative    Cardiovascular: Regular rate on monitor   Respiratory: No acute respiratory distress, breathing comfortably    Orthopedic Exam  RLE:  Optifoam dressings overlying the surgical incisions to the right hip and right distal thigh clean/dry/intact without evidence strikethrough.  Patient is appropriately tender to palpation at and about the surgical incisions.  Compartments of the RLE soft and easily compressible.  EHL/FHL/TA/GSC gross motor intact.  Sural/saphenous/SPN/DPN/plantar sensation to light touch intact.  Limb is warm and well-perfused.    Recent Labs     03/27/25  1140 03/28/25  0823 03/30/25  0235   WBC 8.0   < > 8.3   HGB 8.9*   < > 8.0*   HCT 27.4*   < > 25.1*   *   < > See Reflexed IPF Result   INR 1.1  --   --       < > 138   K 3.9   < > 3.9   BUN 23   < > 41*   CREATININE 1.2   < > 1.5*   GLUCOSE 208*   < > 197*    < > = values in this interval not displayed.      Meds:   DVT ppx: Heparin  See rec for complete list    Impression 84 y.o. male who is being seen for:    -Right intertrochanteric femur fracture    DOS: 3/28/2025 with Dr. Madsen  -Right femur cephalomedullary nail    Plan  - No further plans for orthopedic intervention at this time  - Optifoam on RLE. Do not remove optifoam dressings.  Okay to reinforce/maintain by nursing if necessary. Please notify Ortho if saturated.  - WBAT  to the 
    PROGRESS NOTE          PATIENT NAME: Brandon Smart  MEDICAL RECORD NO. [id number]  DATE: 3/28/2025    HD: # 1      DIAGNOSIS AND PLAN    Right IT femur fx              Ortho consulted plan for OR today pending cardiac risk stratification              MMPT              Admit stepdown              NPO at midnight   Plan for post op ICU admit                   History of CHF, elevated BNP to 19,000, TIA, Pacemaker in place              Cardiology consulted              Fluid restrict              Trend BNP              Pacemaker interrogation      DVT ppx: sub Q hep       Chief Complaint: \"It's okay\"    SUBJECTIVE      Patient seen and examined at bedside.  He is resting comfortably. Does not respond appropriately to questions however overall does not appear to have any pain, is in no distress. Pleasantly demented.  Does not know where he is, keeps telling me he has a twenty dollar bill, \"will that cover it?\".      OBJECTIVE  VITALS:   Vitals:    03/28/25 0753   BP: (!) 160/74   Pulse: 75   Resp: 12   Temp:    SpO2: 95%     Physical Exam  Constitutional:       General: He is not in acute distress.     Appearance: He is not ill-appearing or toxic-appearing.   HENT:      Head: Normocephalic and atraumatic.      Nose: Nose normal.      Mouth/Throat:      Mouth: Mucous membranes are moist.   Eyes:      Extraocular Movements: Extraocular movements intact.   Cardiovascular:      Rate and Rhythm: Normal rate.      Pulses: Normal pulses.      Comments: Paced rhythm   Pulmonary:      Effort: Pulmonary effort is normal. No respiratory distress.   Abdominal:      General: There is no distension.      Palpations: Abdomen is soft.   Musculoskeletal:         General: Tenderness present. No swelling.      Cervical back: No tenderness.   Skin:     General: Skin is warm and dry.   Neurological:      Mental Status: He is alert. He is disoriented.      Comments: Unknown if this is his baseline, unchanged from admit           LAB:  CBC: 
    PROGRESS NOTE          PATIENT NAME: Brandon Smart  MEDICAL RECORD NO. [id number]  DATE: 3/31/2025    HD: # 4      DIAGNOSIS AND PLAN    Right IT femur Fx   - S/p OR with ortho 3/28 for R femur CMN    - WBAT to RLE    - MMPT    - PT/OT    - F/u Dr. Madsen 4/15/25    Acute blood loss anemia    - S/p 1 unit PRBC 3/20  - Continue to monitor     HTN    Continue norvasc    Type 2 DM    - Continue sliding scale insulin     CAD   Restart home aspirin    DVT Prophylaxis -Heparin   Dispo planning - medically stable for discharge to SNF       Chief Complaint: \"None\"    SUBJECTIVE    Patient seen and evaluated at bedside. Resting comfortably with no complaints.    OBJECTIVE  VITALS:   Vitals:    03/31/25 1200   BP: (!) 145/59   Pulse: 62   Resp: 19   Temp: 97.8 °F (36.6 °C)   SpO2: 99%     Physical Exam  Constitutional:       General: He is sleeping. He is not in acute distress.  HENT:      Head: Normocephalic and atraumatic.      Right Ear: External ear normal. Decreased hearing noted.      Left Ear: External ear normal. Decreased hearing noted.      Nose: Nose normal.      Mouth/Throat:      Mouth: Mucous membranes are moist.   Eyes:      Extraocular Movements: Extraocular movements intact.   Cardiovascular:      Rate and Rhythm: Normal rate.      Pulses: Normal pulses.   Pulmonary:      Effort: Pulmonary effort is normal. No respiratory distress.   Abdominal:      Palpations: Abdomen is soft.      Tenderness: There is no abdominal tenderness.   Musculoskeletal:      Right lower leg: No edema.      Left lower leg: No edema.   Skin:     General: Skin is warm and dry.      Comments: Dressing over right hip intact, clean, dry.    Neurological:      Mental Status: Mental status is at baseline.      Motor: Tremor present.       LAB:  CBC:   Recent Labs     03/29/25  1410 03/29/25  1940 03/30/25  0235 03/31/25  0340   WBC 10.4  --  8.3 8.7   HGB 7.2* 6.6* 8.0* 8.0*   HCT 22.5* 20.7* 25.1* 25.1*   MCV 85.6  --  86.9 89.3   PLT 
    PROGRESS NOTE          PATIENT NAME: Brandon Smart  MEDICAL RECORD NO. [id number]  DATE: 3/30/2025    HD: # 3      DIAGNOSIS AND PLAN    Right IT femur Fx   - S/p OR with ortho 3/28 for R femur CMN    - WBAT to RLE    - MMPT    - PT/OT    - F/u Dr. Madsen 4/15/25    Acute blood loss anemia    - S/p 1 unit PRBC 3/29  - Hgb today 8.0  - Continue to monitor     HTN    - Increased Norvasc to 10mg daily per medicine     Type 2 DM    - Continue sliding scale insulin     DVT Prophylaxis -Heparin   Dispo planning - SNF       Chief Complaint: \"No pain\"    SUBJECTIVE    Patient seen and evaluated at bedside. Patient required 1 unit PRBC overnight due to Hgb 6.6. Post transfusion hgb 8.0. Afebrile, VSS. Patient sleeping, did wake up to stimuli. Patient is very hard of hearing. Denies any pain. Patient did require straight cath x2 overnight, will continue to monitor today and consider farley if continues to retain urine.     OBJECTIVE  VITALS:   Vitals:    03/30/25 1139   BP: 137/64   Pulse: 70   Resp: 16   Temp: 97.6 °F (36.4 °C)   SpO2: 95%     Physical Exam  Constitutional:       General: He is sleeping. He is not in acute distress.  HENT:      Head: Normocephalic and atraumatic.      Right Ear: External ear normal. Decreased hearing noted.      Left Ear: External ear normal. Decreased hearing noted.      Nose: Nose normal.      Mouth/Throat:      Mouth: Mucous membranes are moist.   Eyes:      Extraocular Movements: Extraocular movements intact.   Cardiovascular:      Rate and Rhythm: Normal rate.      Pulses: Normal pulses.   Pulmonary:      Effort: Pulmonary effort is normal. No respiratory distress.   Abdominal:      Palpations: Abdomen is soft.      Tenderness: There is no abdominal tenderness.   Musculoskeletal:      Right lower leg: No edema.      Left lower leg: No edema.   Skin:     General: Skin is warm and dry.      Comments: Dressing over right hip intact, clean, dry.    Neurological:      Mental Status: Mental 
    PROGRESS NOTE      PATIENT NAME: Brandon Smart  MEDICAL RECORD NO. [id number]  DATE: 4/3/2025    HD: # 7      DIAGNOSIS AND PLAN    Right IT femur Fx   - S/p OR with ortho 3/28 for R femur CMN    - WBAT to RLE    - MMPT    - PT/OT    - F/u Dr. Madsen 4/15/25    Acute blood loss anemia    - S/p 1 unit PRBC 3/20  - hgb stable    HTN    Continue norvasc    Type 2 DM    - Continue sliding scale insulin     CAD   Restarted home aspirin    DVT Prophylaxis -Heparin   Dispo planning - medically stable for discharge to SNF       Chief Complaint: \"None\"    SUBJECTIVE    Patient seen and evaluated at bedside. Resting comfortably with no complaints. AxO x3`. more alert this morning and able to discuss the weather.    OBJECTIVE  VITALS:   Vitals:    04/03/25 0448   BP: (!) 158/77   Pulse: 64   Resp: 14   Temp: 98.2 °F (36.8 °C)   SpO2: 90%     Physical Exam  Constitutional:       General: He is sleeping. He is not in acute distress.  HENT:      Head: Normocephalic and atraumatic.      Right Ear: External ear normal. Decreased hearing noted.      Left Ear: External ear normal. Decreased hearing noted.      Nose: Nose normal.      Mouth/Throat:      Mouth: Mucous membranes are moist.   Eyes:      Extraocular Movements: Extraocular movements intact.   Cardiovascular:      Rate and Rhythm: Normal rate.      Pulses: Normal pulses.   Pulmonary:      Effort: Pulmonary effort is normal. No respiratory distress.   Abdominal:      Palpations: Abdomen is soft.      Tenderness: There is no abdominal tenderness.   Musculoskeletal:         General: No tenderness.      Right lower leg: No edema.      Left lower leg: No edema.      Comments: No weakness   Skin:     General: Skin is warm and dry.      Capillary Refill: Capillary refill takes less than 2 seconds.      Comments: Dressing over right hip intact, clean, dry.    Neurological:      Mental Status: Mental status is at baseline.      Motor: Tremor present.       LAB:  CBC:   Recent 
    PROGRESS NOTE      PATIENT NAME: Brandon Smart  MEDICAL RECORD NO. [id number]  DATE: 4/2/2025    HD: # 6      DIAGNOSIS AND PLAN    Right IT femur Fx   - S/p OR with ortho 3/28 for R femur CMN    - WBAT to RLE    - MMPT    - PT/OT    - F/u Dr. Madsen 4/15/25    Acute blood loss anemia    - S/p 1 unit PRBC 3/20  - hgb stable    HTN    Continue norvasc    Type 2 DM    - Continue sliding scale insulin     CAD   Restarted home aspirin    DVT Prophylaxis -Heparin   Dispo planning - medically stable for discharge to SNF       Chief Complaint: \"None\"    SUBJECTIVE    Patient seen and evaluated at bedside. Resting comfortably with no complaints. AxO x3`. Was replacing his hearing aids this morning    OBJECTIVE  VITALS:   Vitals:    04/02/25 0434   BP: (!) 148/106   Pulse: 73   Resp: 14   Temp: 97.2 °F (36.2 °C)   SpO2:      Physical Exam  Constitutional:       General: He is sleeping. He is not in acute distress.  HENT:      Head: Normocephalic and atraumatic.      Right Ear: External ear normal. Decreased hearing noted.      Left Ear: External ear normal. Decreased hearing noted.      Nose: Nose normal.      Mouth/Throat:      Mouth: Mucous membranes are moist.   Eyes:      Extraocular Movements: Extraocular movements intact.   Cardiovascular:      Rate and Rhythm: Normal rate.      Pulses: Normal pulses.   Pulmonary:      Effort: Pulmonary effort is normal. No respiratory distress.   Abdominal:      Palpations: Abdomen is soft.      Tenderness: There is no abdominal tenderness.   Musculoskeletal:      Right lower leg: No edema.      Left lower leg: No edema.   Skin:     General: Skin is warm and dry.      Comments: Dressing over right hip intact, clean, dry.    Neurological:      Mental Status: Mental status is at baseline.      Motor: Tremor present.       LAB:  CBC:   Recent Labs     03/31/25  0340 04/01/25  0654   WBC 8.7 9.4   HGB 8.0* 7.3*   HCT 25.1* 23.2*   MCV 89.3 90.3    187     BMP:   Recent Labs     
    PROGRESS NOTE      PATIENT NAME: Brandon Smart  MEDICAL RECORD NO. [id number]  DATE: 4/1/2025    HD: # 5      DIAGNOSIS AND PLAN    Right IT femur Fx   - S/p OR with ortho 3/28 for R femur CMN    - WBAT to RLE    - MMPT    - PT/OT    - F/u Dr. Madsen 4/15/25    Acute blood loss anemia    - S/p 1 unit PRBC 3/20  - hgb 7.3 (8)    HTN    Continue norvasc    Type 2 DM    - Continue sliding scale insulin     CAD   Restart home aspirin    DVT Prophylaxis -Heparin   Dispo planning - medically stable for discharge to SNF       Chief Complaint: \"None\"    SUBJECTIVE    Patient seen and evaluated at bedside. Resting comfortably with no complaints. AxO x3    OBJECTIVE  VITALS:   Vitals:    04/01/25 0747   BP: (!) 155/68   Pulse: 72   Resp: 15   Temp: 97.7 °F (36.5 °C)   SpO2: 95%     Physical Exam  Constitutional:       General: He is sleeping. He is not in acute distress.  HENT:      Head: Normocephalic and atraumatic.      Right Ear: External ear normal. Decreased hearing noted.      Left Ear: External ear normal. Decreased hearing noted.      Nose: Nose normal.      Mouth/Throat:      Mouth: Mucous membranes are moist.   Eyes:      Extraocular Movements: Extraocular movements intact.   Cardiovascular:      Rate and Rhythm: Normal rate.      Pulses: Normal pulses.   Pulmonary:      Effort: Pulmonary effort is normal. No respiratory distress.   Abdominal:      Palpations: Abdomen is soft.      Tenderness: There is no abdominal tenderness.   Musculoskeletal:      Right lower leg: No edema.      Left lower leg: No edema.   Skin:     General: Skin is warm and dry.      Comments: Dressing over right hip intact, clean, dry.    Neurological:      Mental Status: Mental status is at baseline.      Motor: Tremor present.       LAB:  CBC:   Recent Labs     03/30/25  0235 03/31/25  0340 04/01/25  0654   WBC 8.3 8.7 9.4   HGB 8.0* 8.0* 7.3*   HCT 25.1* 25.1* 23.2*   MCV 86.9 89.3 90.3   PLT See Reflexed IPF Result [id number]     BMP: 
    Pharmacy Note - Renal dose adjustment made per P/T protocol    Original order:  Cefdinir 300mg PO Daily     Estimated Creatinine Clearance: 42 mL/min (based on SCr of 1.1 mg/dL).    Recent Labs     03/29/25  1410 03/30/25  0235 03/31/25  0340   BUN 39* 41* 34*   CREATININE 1.5* 1.5* 1.1       Renally adjusted order:  Cefdinir 300mg PO Q 12 hours for CrCl greater than 30mL/min    Please call pharmacy with any questions.    Thank you,  Nadeen De Los Santos [facility]  3/31/2025 10:52 AM    
  ICU PROGRESS NOTE      PATIENT NAME: Brandon Smart  MEDICAL RECORD NO. [id number]  DATE: 3/29/2025    HD: # 2    SUBJECTIVE    Brandon Smart is a 84 y.o. male     CC's/Mechanism: 84 yom presents to Trauma service after mechanical fall and subsequently femur fracture. The fall was unwitnessed but family believes he simply tripped. Patient has dementia and cannot provide any details     Injuries:  Right intertrochanteric femur fracture     Brief Interval History: POD 1 Right femur cephalomedullary nail     AM Eval:     BRIEF PLAN:     Past Medical History:  has a past medical history of Alzheimer dementia (HCC), CAD (coronary artery disease), Cataract, Diabetes mellitus (HCC), Goiter, nontoxic, multinodular, Hearing loss, bilateral, [place] (hard of hearing), Hyperlipidemia, Hypertension, Mediastinal mass, Osteoarthritis, Peptic ulcer, TIA (transient ischemic attack), Wears glasses, and Wears partial dentures.     Past Surgical History:  has a past surgical history that includes Cardiac surgery; Cardiac catheterization (,,); pacemaker placement (1999); Carpal tunnel release (Left); External ear surgery (Bilateral, 1959); sinus surgery; Tonsillectomy; Mastoid surgery (1959); hernia repair; Vasectomy; back surgery (2007); Colonoscopy; Mediastinoscopy (2014); Femur Surgery (Right, 2025); and Femur Surgery (Right, 2025).     Past Social History:  reports that he has quit smoking. He started smoking about 63 years ago. He has never used smokeless tobacco. He reports that he does not drink alcohol and does not use drugs.    MEDICAL DECISION MAKING AND PLAN    NEURO:   -GCS: 14  -Tylenol 1000mg Q8hr   -Roxicodone 5mg q4h   -hx of Alzheimers dementia - Aricept and Memantine home meds  2.   CV:  -Hx of CAD, HTN, HLD  -SBPs: 128-172  Cuff(24hrs): Systolic (24hrs), Av , Min:128 , Max:174     //   A-Line(24hrs): No data recorded  -MAP:   Cuff(24hrs): MAP (Calculated)  
  Orthopedic Progress Note     Patient:  Brandon Smart  YOB: 1940     84 y.o. male    Subjective  Patient seen and examined.  No complaints or concerns.  No issue overnight.  Pain controlled.  Denies fever, HA, CP, SOB, N/V, dysuria.  To work with PT post-op.    Vitals reviewed, afebrile.    Objective  Vitals:    03/29/25 0300   BP: (!) 156/75   Pulse: 68   Resp: 19   Temp:    SpO2:      Gen: NAD, Cooperative.   Cardiovascular: Regular Rate  Respiratory: No Acute Respiratory Distress  MSK:  RLE  Dressings C/D/I. Compartments soft. EHL/FHL/TA/GSC motor intact. Sural, Saphenous, Superficial/Deep Peroneal, and Plantar Nerve distribution SILT. Foot warm and well perfused.    Recent Labs     03/27/25  1140 03/28/25  0823 03/28/25  2247   WBC 8.0   < > 13.1*   HGB 8.9*   < > 8.4*   HCT 27.4*   < > 26.6*   *   < > 127*   INR 1.1  --   --       < > 145   K 3.9   < > 4.2   BUN 23   < > 32*   CREATININE 1.2   < > 1.3*   GLUCOSE 208*   < > 191*    < > = values in this interval not displayed.      Meds: See Rec for Complete List    Impression 84 y.o. male being seen for    - Right intertrochanteric femur fracture    DOS: 3/28/25 (POD#1)  - Right femur cephalomedullary nail    Plan  - No plan for further Orthopaedic intervention at this time.  - WB status: WBAT RLE  - Dressings about RLE, please maintain and reinforce as needed  - Pain Management:  Per primary team  - Antibiotics:  Post-Op Ancef   - DVT ppx:  OK from Orthopaedic perspective, recommending minimum of 81mg ASA BID for 30 days post-op    - Encourage Incentive Spirometry use  - PT/OT  - Dispo: OK to discharge from orthopedic perspective pending PT/OT recs and once medically appropriate.   - F/u Dr. Madsen 4/15  - Please page the On Call Ortho resident with any questions.  _________________________________  Justin Lopez D.O.  Orthopedic Surgery Resident, PGY-3  [place], [place]    Please excuse any 
  Physician Progress Note      PATIENT:               AR CHICAS  CSN #:                  [id number]  :                       1940  ADMIT DATE:       3/27/2025 3:32 PM  DISCH DATE:  RESPONDING  PROVIDER #:        Romana Puga MD          QUERY TEXT:    Patient admitted with Right Hip fx. Documentation reflects Pneumonia per ED   provider, as well as Cardiology consult note 3/28.  If possible, please   document in the progress notes and discharge summary if Pneumonia was:    The medical record reflects the following:  Risk Factors: HTN, CAD, CHF, Dementia  Clinical Indicators: 85 yo M s/p fall in [place] resulting in rt hip fx with Nail   insertion 3/28. Per ED progress notes \"Pneumonia of RLL\" documented under   Final Impression. Per Cardiology Consult 3/28-\" Pneumonia of right lower lobe   due to infectious organism\" listed under Admission Dx list. Per ED notes-\"    Patient's chest x-ray also concerning for possible pneumonia and did receive a   dose of Levaquin prior to transfer.\"  Chest x-ray - Cardiomegaly Right   basilar opacity could represent subsegmental atelectasis or infection.  Wbc   13.1   ,Bnp  01065 - 8271  trops 38-50  Pt denies   Treatment: Levaquin, Ancef  Options provided:  -- Pneumonia confirmed after study  -- Pneumonia treated and resolved  -- Pneumonia ruled out after study  -- Other - I will add my own diagnosis  -- Disagree - Not applicable / Not valid  -- Disagree - Clinically unable to determine / Unknown  -- Refer to Clinical Documentation Reviewer    PROVIDER RESPONSE TEXT:    Pneumonia confirmed after study.    Query created by: Jumana Flores on 3/31/2025 7:36 AM      Electronically signed by:  Romana Puga MD 3/31/2025 10:47 AM          
  Physician Progress Note      PATIENT:               AR CHICAS  CSN #:                  [id number]  :                       1940  ADMIT DATE:       3/27/2025 3:32 PM  DISCH DATE:  RESPONDING  PROVIDER #:        Noble John MD          QUERY TEXT:    Patient admitted with Right Hip fx. Documentation reflects Pneumonia per ED   provider, as well as Cardiology consult note 3/28.  If possible, please   document in the progress notes and discharge summary if Pneumonia was:    The medical record reflects the following:  Risk Factors: HTN, CAD, CHF, Dementia  Clinical Indicators: 85 yo M s/p fall in [place] resulting in rt hip fx with Nail   insertion 3/28. Per ED progress notes \"Pneumonia of RLL\" documented under   Final Impression. Per Cardiology Consult 3/28-\" Pneumonia of right lower lobe   due to infectious organism\" listed under Admission Dx list. Per ED notes-\"    Patient's chest x-ray also concerning for possible pneumonia and did receive a   dose of Levaquin prior to transfer.\"  Chest x-ray - Cardiomegaly Right   basilar opacity could represent subsegmental atelectasis or infection.  Wbc   13.1   ,Bnp  12416 - 8271  trops 38-50  Pt denies   Treatment: Levaquin, Ancef  Options provided:  -- Pneumonia confirmed after study  -- Pneumonia treated and resolved  -- Pneumonia ruled out after study  -- Other - I will add my own diagnosis  -- Disagree - Not applicable / Not valid  -- Disagree - Clinically unable to determine / Unknown  -- Refer to Clinical Documentation Reviewer    PROVIDER RESPONSE TEXT:    Provider is clinically unable to determine a response to this query.    Query created by: Jumana Flores on 3/31/2025 7:06 AM      QUERY TEXT:    Pt admitted withRight hip fracture and has CHF documented. If possible, please   document in progress notes and discharge summary further specificity   regarding the type and acuity of CHF:    The medical record reflects the following:  Risk Factors: HTN, CAD, 
  Physician Progress Note      PATIENT:               AR CHICAS  CSN #:                  [id number]  :                       1940  ADMIT DATE:       3/27/2025 3:32 PM  DISCH DATE:  RESPONDING  PROVIDER #:        Romana Puga MD          QUERY TEXT:    Pt admitted withRight hip fracture and has CHF documented. If possible, please   document in progress notes and discharge summary further specificity   regarding the type and acuity of CHF:    The medical record reflects the following:  Risk Factors: HTN, CAD, CHF, Dementia, SSS  Clinical Indicators: 83 yo M s/p fall in [place] resulting in rt hip fx with Nail   insertion 3/28.Per H&P, CHF is documented.  Chest x-ray - Cardiomegaly Right   basilar opacity could represent subsegmental atelectasis or infection. Last   echo 1014-\" Left ventricle appears normal in size. There is mild increased   wall thickness/hypertrophy. Systolic function is normal with an EF of 55-60%.   The quantitative EF by 2D Rojas biplane is 54%. No segmental wall motion   abnormalities. Normal diastolic function is present..\"  Bnp  52999 - [id number]    trops 38-50  Pt denies SOB.  Treatment: Fluid restriction, trend Bnp, Cardio consult  Options provided:  -- Acute on Chronic Systolic CHF/HFrEF  -- Acute on Chronic Diastolic CHF/HFpEF  -- Acute on Chronic Systolic and Diastolic CHF  -- Chronic Systolic CHF/HFrEF  -- Chronic Diastolic CHF/HFpEF  -- Chronic Systolic and Diastolic CHF  -- Other - I will add my own diagnosis  -- Disagree - Not applicable / Not valid  -- Disagree - Clinically unable to determine / Unknown  -- Refer to Clinical Documentation Reviewer    PROVIDER RESPONSE TEXT:    This patient has chronic diastolic CHF/HFpEF.    Query created by: Jumana Flores on 3/31/2025 7:35 AM      Electronically signed by:  Romana Puga MD 3/31/2025 10:17 AM          
  [facility]  DAISHA Simmons   3/28/2025    Brandon Smart  1940  [id number]      Time spent with Patient:  0 minutes  Time spent with Family:  0 minutes    This writer was unable to complete screen or therapy services with patient at bedside at this time due to the following:  Out of room for medical procedure   
  [facility]  DAISHA Simmons   3/31/2025    Brandon Smart  1940  [id number]      Time spent with Patient:  0 minutes  Time spent with Family:  0 minutes    This writer was unable to complete screen or therapy services with patient at bedside at this time due to the following:  Not currently appropriate due to mental status   
  [facility]     Emergency/Trauma Note    PATIENT NAME: Brandon Smart    Shift date: 03/27/2025  Shift day: Thursday   Shift # 2    Room # 23/23   Name: Brandon Smart            Age: 84 y.o.  Gender: male          Mosque: Yarsani   Place of Yarsanism:     Trauma/Incident type: Adult Trauma Consult  Admit Date & Time: 3/27/2025  3:32 PM  TRAUMA NAME: N/A    ADVANCE DIRECTIVES IN CHART?  No    NAME OF DECISION MAKER: Unknown    RELATIONSHIP OF DECISION MAKER TO PATIENT: Unknown    PATIENT/EVENT DESCRIPTION:  Brandon Smart is a 84 y.o. male who arrived at [facility].  responded to Trauma Consult to ER 23.  experienced patient as altered. Pt appeared calm and coping and shared memories. Pt to be admitted to 23/23.         SPIRITUAL ASSESSMENT-INTERVENTION-OUTCOME:   provided a supportive presence through active listening and words of affirmation.     PATIENT BELONGINGS:  Writer did not handle patient belongings    ANY BELONGINGS OF SIGNIFICANT VALUE NOTED:  N/A    REGISTRATION STAFF NOTIFIED?  Yes      WHAT IS YOUR SPIRITUAL CARE PLAN FOR THIS PATIENT?:   Chaplains will remain available to offer spiritual and emotional support as needed.    Electronically signed by Chaplain Jacob, on 3/27/2025 at 8:19 PM.  [facility]  [phone]    
15 Variable Trauma-Specific Frailty Index   Comorbidities   Cancer History No (0)   Coronary Heart Disease Mild (0.25)   Dementia Moderate (0.5)   Daily Activities   Help With Grooming Yes (1)   Help with Managing Money Yes (1)   Help doing Housework Yes (1)   Help with Toileting Unknown   Help Walking No (0)   Health Attitude   Feel Less Useful Unable to answer   Feel Sad Unable to answer   Feel Effort to Do Everything Unable to answer   Feels Lonely Unable to answer   Falls Sometimes (0.5)   Function   Sexually Active Unknown   Nutrition   Albumin Labs pending   Scoring   Score  >4   *Based on 2-weeks prior to hospital admission   Trauma Specific Fraility Index > or = to 4 (4/15 = 0.26)  Trauma Specific Fraility Index Score:    >4 Frail    Sirena Pringle,   General Surgery Resident PGY-2    
[facility]  Office: [phone]  Roberto Hendricks DO, Eric Weinstein DO, Edgar Mcallister DO, Markus Smith DO, Casper Horan MD, Nimo Carreon MD, Laquita Aguayo MD, Tamara Lo MD,  Joey Welsh MD, Noé Forde MD, Rod Radford MD,  Mariaelena Davidson DO, Aiden Levine MD, Dakota Morgan MD, Michael Hendricks DO, Vicky Ventura MD,  Lul Peterson DO, Ana Wolf MD, Maria L Gabriel MD, Christina Stapleton MD,  Sunil Couletr MD, Darnell Palomino MD, Ayad Santos MD, Astrid Damon MD, Fritz Merritt MD, Mckenna Ramirez MD, Mike Burton DO, Lazaro Rivera MD, Mariaelena Peres MD, Mohsin Reza, MD, Romana Puga MD, Shirley Waterhouse, CNP,  Wanda Medina CNP, Mike Jose, CNP,  Susana Gonzalez, DNP, Shayy Meek, CNP, Mariah Matthews, CNP, Payton Falcon, CNP, Stacy Lord, CNP, Michelle Jimenez, PA-C, Priscilla Morrison, CNP, Lyndsey Bullard, CNP,  Tiffani Quevedo, CNP, Peggy Brennan, CNP, Kirk Gill, PA-C, Alda Mccall, CNP,  Sandy Sims, CNS, Evelin Arenas, CNP, Bhumi Thomas, CNP,   Valentina Champion, CNP         [facility]   IN-PATIENT SERVICE   [facility]    Progress Note    4/1/2025    11:52 AM    Name:   Brandon Smart  MRN:     [id number]     Acct:      [id number]   Room:   0425/0425-01  IP Day:  5  Admit Date:  3/27/2025  3:32 PM    PCP:   Ruth Ann Richmond APRN - CNP  Code Status:  DNR-CCA    Subjective:     C/C:   Chief Complaint   Patient presents with    Hip Injury     Right; [place]'s transfer     Interval History Status: not changed.     No issues overnight  No complaints this AM  Pain controlled     Medications:     Allergies:    Allergies   Allergen Reactions    Latex     Amoxicillin Itching    Diclofenac        Current Meds:   Scheduled Meds:    sennosides-docusate sodium  2 tablet Oral Daily    [START ON 4/2/2025] lisinopril  40 mg Oral Daily    cefdinir  300 mg Oral BID    carvedilol  37.5 mg Oral BID WC    aspirin  81 mg Oral Daily    
[facility]  Office: [phone]  Roberto Hendricks DO, Eric Weinstein DO, Edgar Mcallister DO, Markus Smith DO, Casper Horan MD, Nimo Carreon MD, Laquita Aguayo MD, Tamara Lo MD,  Joey Welsh MD, Noé Forde MD, Rod Radford MD,  Mariaelena Davidson DO, Aiden Levine MD, Dakota Morgan MD, Michael Hendricks DO, Vicky Ventura MD,  Lul Peterson DO, Ana Wolf MD, Maria L Gabriel MD, Christina Stapleton MD,  Sunil Coulter MD, Darnell Palomino MD, Ayad Santos MD, Astrid Damon MD, Fritz Merritt MD, Mckenna Ramirez MD, Mike Burton DO, Lazaro Rivera MD, Mariaelena Peres MD, Mohsin Reza, MD, Romana Puga MD, Shirley Waterhouse, CNP,  Wanda Medina CNP, Mike Jose, CNP,  Susana Gonzalez, DNP, Shayy Meek, CNP, Mariah Matthews, CNP, Payton Falcon, CNP, Stacy Lord, CNP, Michelle Jimenez, PA-C, Priscilla Morrison, CNP, Lyndsey Bullard, CNP,  Tiffani Quevedo, CNP, Peggy Brennan, CNP, Kirk Gill, PA-C, Alda Mccall, CNP,  Sandy Sims, CNS, Evelin Arenas, CNP, Bhumi Thomas, CNP,   Valentina Champion, CNP         [facility]   IN-PATIENT SERVICE   [facility]    Progress Note    4/3/2025    9:18 AM    Name:   Brandon Smart  MRN:     [id number]     Acct:      [id number]   Room:   0425/0425-01  IP Day:  7  Admit Date:  3/27/2025  3:32 PM    PCP:   Ruth Ann Richmond APRN - CNP  Code Status:  DNR-CCA    Subjective:     C/C:   Chief Complaint   Patient presents with    Hip Injury     Right; [place]'s transfer     Interval History Status: not changed.     No issues overnight  No complaints this AM  Pain controlled   Hard of hearing  Discussed with RN  Ciro planning today     Medications:     Allergies:    Allergies   Allergen Reactions    Latex     Amoxicillin Itching    Diclofenac        Current Meds:   Scheduled Meds:    sennosides-docusate sodium  2 tablet Oral Daily    lisinopril  40 mg Oral Daily    carvedilol  37.5 mg Oral BID WC    aspirin  81 mg Oral 
Blood sugar 176 mg/dl  
Critical hemoglobin result of 6.6, surgery team notified per writer. Orders placed to transfuse 1 unit PRBC's per resident.  
[facility]   Progress Note                   Date:   3/31/2025  Patient name: Brandon Smart  Date of admission:  3/27/2025  3:32 PM  MRN:   [id number]  YOB: 1940  PCP: Ruth Ann Richmond, APRN - CNP    Reason for Admission: Closed comminuted intertrochanteric fracture of proximal femur, right, initial encounter [S72.141A]  Closed comminuted intertrochanteric fracture of proximal end of right femur, initial encounter [S72.141A]  Pneumonia of right lower lobe due to infectious organism [J18.9]    Subjective:       Clinical Changes / Abnormalities:Pt seen and examined in the room.  Patient resting in bed. Pt denies any CP or sob.  Labs, vitals and tele reviewed-paced with frequent PVC's    Medications:   Scheduled Meds:   lisinopril  20 mg Oral Daily    cefdinir  300 mg Oral BID    tamsulosin  0.4 mg Oral Daily    amLODIPine  10 mg Oral Daily    sodium chloride flush  5-40 mL IntraVENous 2 times per day    polyethylene glycol  17 g Oral Daily    heparin (porcine)  5,000 Units SubCUTAneous 3 times per day    acetaminophen  1,000 mg Oral 3 times per day    ascorbic acid  500 mg Oral Daily    atorvastatin  10 mg Oral Daily    carvedilol  25 mg Oral BID WC    Vitamin D  2,000 Units Oral Daily    cloNIDine  0.1 mg Oral TID    donepezil  10 mg Oral Nightly    ferrous sulfate  325 mg Oral Daily with breakfast    finasteride  5 mg Oral Daily    magnesium oxide  400 mg Oral BID    memantine  10 mg Oral BID    vitamin B-12  1,000 mcg Oral Daily    insulin lispro  0-16 Units SubCUTAneous 4x Daily AC & HS     Continuous Infusions:   sodium chloride      sodium chloride       CBC:   Recent Labs     03/29/25  1410 03/29/25  1940 03/30/25  0235 03/31/25  0340   WBC 10.4  --  8.3 8.7   HGB 7.2* 6.6* 8.0* 8.0*     --  See Reflexed IPF Result 159     BMP:    Recent Labs     03/29/25  1410 03/30/25  0235 03/31/25  0340    138 142   K 4.2 3.9 3.8    105 106   CO2 21 23 27   BUN 39* 41* 
[facility]   Progress Note                   Date:   4/1/2025  Patient name: Brandon Smart  Date of admission:  3/27/2025  3:32 PM  MRN:   [id number]  YOB: 1940  PCP: Rut hAnn Richmond, APRN - CNP    Reason for Admission: Closed comminuted intertrochanteric fracture of proximal femur, right, initial encounter [S72.141A]  Closed comminuted intertrochanteric fracture of proximal end of right femur, initial encounter [S72.141A]  Pneumonia of right lower lobe due to infectious organism [J18.9]    Subjective:       Clinical Changes / Abnormalities: Pt seen and examined in the room.  Patient resting in bed with therapy at bedside. Pt denies any CP or sob.  Labs, vitals and tele reviewed- AV paced    Medications:   Scheduled Meds:   sennosides-docusate sodium  2 tablet Oral Daily    lisinopril  20 mg Oral Daily    cefdinir  300 mg Oral BID    carvedilol  37.5 mg Oral BID WC    aspirin  81 mg Oral Daily    tamsulosin  0.4 mg Oral Daily    amLODIPine  10 mg Oral Daily    sodium chloride flush  5-40 mL IntraVENous 2 times per day    polyethylene glycol  17 g Oral Daily    heparin (porcine)  5,000 Units SubCUTAneous 3 times per day    acetaminophen  1,000 mg Oral 3 times per day    ascorbic acid  500 mg Oral Daily    atorvastatin  10 mg Oral Daily    Vitamin D  2,000 Units Oral Daily    cloNIDine  0.1 mg Oral TID    donepezil  10 mg Oral Nightly    ferrous sulfate  325 mg Oral Daily with breakfast    finasteride  5 mg Oral Daily    magnesium oxide  400 mg Oral BID    memantine  10 mg Oral BID    vitamin B-12  1,000 mcg Oral Daily    insulin lispro  0-16 Units SubCUTAneous 4x Daily AC & HS     Continuous Infusions:   sodium chloride      sodium chloride       CBC:   Recent Labs     03/30/25 0235 03/31/25  0340 04/01/25  0654   WBC 8.3 8.7 9.4   HGB 8.0* 8.0* 7.3*   PLT See Reflexed IPF Result 159 [id number]     BMP:    Recent Labs     03/30/25 0235 03/31/25  0340 04/01/25  0654    142 144   K 
[facility] Inpatient Discharge Summary  DAISHA Simmons  4/3/2025        Brandon Smart  1940  [id number]    Date & Time of Discharge: 4/3/2025  1:18 PM     Is consult a Victim of Crime?: No    Services provided:  Unable to complete due to medical condition    Screen Results (If any):        Discharge Recommendations:  No outpatient mental health services recommended, contact Mental Health Provider if symptoms develop      The therapist may be reached through the [facility] offices at [phone].   
Occupational Therapy    [facility]  Occupational Therapy Not Seen Note    DATE: 3/28/2025    NAME: Brandon Smart  MRN: [id number]   : 1940      Patient not seen this date for Occupational Therapy due to:    Surgery/Procedure: Pt with a R IT fracture, on strict bedrest. Plan for OR with Dr. Madsen today 3/28 pending cardiology clearance.     Next Scheduled Treatment: 3/29/2025    Electronically signed by MEL RAMOS/L on 3/28/2025 at 8:59 AM   
Occupational Therapy  Occupational Therapy Initial Evaluation  Facility/Department: [facility] CAR 1- SIC   Patient Name: Brandon Smart        MRN: [id number]    : 1940    Date of Service: 3/29/2025    Chief Complaint   Patient presents with    Hip Injury     Right; [place]'s transfer     Past Medical History:  has a past medical history of Alzheimer dementia (HCC), CAD (coronary artery disease), Cataract, Diabetes mellitus (HCC), Goiter, nontoxic, multinodular, Hearing loss, bilateral, [place] (hard of hearing), Hyperlipidemia, Hypertension, Mediastinal mass, Osteoarthritis, Peptic ulcer, TIA (transient ischemic attack), Wears glasses, and Wears partial dentures.  Past Surgical History:  has a past surgical history that includes Cardiac surgery; Cardiac catheterization (,,); pacemaker placement (1999); Carpal tunnel release (Left); External ear surgery (Bilateral, 1959); sinus surgery; Tonsillectomy; Mastoid surgery (1959); hernia repair; Vasectomy; back surgery (2007); Colonoscopy; Mediastinoscopy (2014); Femur Surgery (Right, 2025); and Femur Surgery (Right, 2025).    Discharge Recommendations  Discharge Recommendations: Patient would benefit from continued therapy after discharge  OT Equipment Recommendations  Equipment Needed: No    Assessment  Performance deficits / Impairments: Decreased functional mobility ;Decreased ADL status;Decreased strength;Decreased safe awareness;Decreased balance;Decreased endurance;Decreased cognition;Decreased coordination  Assessment: Patient is presumably modified  independent using FWW with functional mobility and personal ADLs. At this time patient requires up to max assist with functional mobility and up to  min assist with UB ADLs, up to max LB ADLs. Patient would benefit from continued therapy during stay and after discharge from acute setting. Patient is not expected to safely return to prior living arrangements. 
Occupational Therapy Daily Treatment Note  Facility/Department: [facility] 4B STEPDOWN   Patient Name: Brandon Smart        MRN: [id number]    : 1940    Date of Service: 2025    Chief Complaint   Patient presents with    Hip Injury     Right; [place]'s transfer     Past Medical History:  has a past medical history of Alzheimer dementia (HCC), CAD (coronary artery disease), Cataract, Diabetes mellitus (HCC), Goiter, nontoxic, multinodular, Hearing loss, bilateral, [place] (hard of hearing), Hyperlipidemia, Hypertension, Mediastinal mass, Osteoarthritis, Peptic ulcer, TIA (transient ischemic attack), Wears glasses, and Wears partial dentures.  Past Surgical History:  has a past surgical history that includes Cardiac surgery; Cardiac catheterization (,,); pacemaker placement (1999); Carpal tunnel release (Left); External ear surgery (Bilateral, 1959); sinus surgery; Tonsillectomy; Mastoid surgery (1959); hernia repair; Vasectomy; back surgery (2007); Colonoscopy; Mediastinoscopy (2014); Femur Surgery (Right, 2025); Femur Surgery (Right, 2025); and Femur Surgery (Right, 3/28/2025).    Discharge Recommendations  Discharge Recommendations: Patient would benefit from continued therapy after discharge  OT Equipment Recommendations  Other: CTA    Assessment  Performance deficits / Impairments: Decreased functional mobility ;Decreased ADL status;Decreased strength;Decreased safe awareness;Decreased balance;Decreased endurance;Decreased cognition;Decreased coordination  Assessment: Pt seen for OT tx this date. Pt currently demonstrates decreased ADL IND secondary to deficits noted above. Pt is requiring MOD x2 for bed mobility, MOD-MAX x2 for transfers and unable to take side steps this date, TD for LB ADLs, and limited standing/activity tolerance. Pt with increased fatigue noted this date with RN aware. Pt is not currently functioning at [facility] and does not currently 
Patient bladder scan volume 170 ml's, writer notified Dr. Stein of results. Per Dr. Steni \"Can you please straight cath, I believe there may be more than that.\"     21:40 Patient straight cath'd per verbal order, 200 ml's urine drained from patient.     21:55  notified of urine output. Per Dr. Stein \"Let's check again at 4 AM. If he has more than 300, we should see if he can try voiding on his own.\"  
Patient spontaneously voided, PVR was 457ml. MD notified. See new orders.  
Patient transferred to room 425 on monitor with all belongings. Patients daughter, Patricia notified of transfer to new room.   
Patients meds given crushed in applesauce. Patient tolerated.   
Physical Medicine & Rehabilitation  Progress Note        Admitting Physician: Noble John MD    Primary Care Provider: Ruth Ann Richmond APRN - CNP     Chief Complaint: R hip pain    Brief History:    This is a follow up to the initial consult on Mr. Brandon Smart is a 84 y.o.  male who was admitted to [facility] on 3/27/2025 with Hip Injury (Right; [place]'s transfer)      He  initially presented to [place] the hospital following fall from standing height, found down in his bathroom.  On initial evaluation patient was confused, only A&O x 1, unclear if he had head injury.  He was transferred to [place] for further evaluation.  Upon arrival he was complaining of right hip pain.  Radiographs demonstrated mildly displaced right intertrochanteric femur fracture. Dr. Shine performed R cephalomedullary nail fixation and removal of superficial R femur implant on 3/28/25.     Subjective:  The patient is resting comfortably. The patient's mobility is diminished. Patient is difficult to rouse today and when he does wake briefly is disoriented to place and time.       ROS:  Constitutional: negative for anorexia, chills, fatigue, fevers, sweats and weight loss  Eyes: negative for redness and visual disturbance  Ears, nose, mouth, throat, and face: negative for earaches, epistaxis, sore throat and tinnitus  Respiratory: negative for cough and shortness of breath  Cardiovascular: negative for chest pain, dyspnea and palpitations  Gastrointestinal: negative for abdominal pain, change in bowel habits, constipation, nausea and vomiting  Genitourinary:negative for dysuria, frequency, hesitancy and urinary incontinence  Integument/breast: negative for pruritus and rash  Musculoskeletal:negative for stiff joints  Neurological: negative for dizziness, headaches   Behavioral/Psych: negative for decreased appetite, depression     Rehabilitation:     PT:    Bed Mobility-  Bed mobility  Supine to Sit: 2 Person 
Physical Therapy  Facility/Department: [address] STEPDOWN   Physical Therapy Daily Treatment Note    Patient Name: Brandon Smart        MRN: [id number]    : 1940    Date of Service: 2025    Chief Complaint   Patient presents with    Hip Injury     Right; [place]'s transfer     Past Medical History:  has a past medical history of Alzheimer dementia (HCC), CAD (coronary artery disease), Cataract, Diabetes mellitus (HCC), Goiter, nontoxic, multinodular, Hearing loss, bilateral, [place] (hard of hearing), Hyperlipidemia, Hypertension, Mediastinal mass, Osteoarthritis, Peptic ulcer, TIA (transient ischemic attack), Wears glasses, and Wears partial dentures.  Past Surgical History:  has a past surgical history that includes Cardiac surgery; Cardiac catheterization (,,); pacemaker placement (1999); Carpal tunnel release (Left); External ear surgery (Bilateral, 1959); sinus surgery; Tonsillectomy; Mastoid surgery (1959); hernia repair; Vasectomy; back surgery (2007); Colonoscopy; Mediastinoscopy (2014); Femur Surgery (Right, 2025); Femur Surgery (Right, 2025); and Femur Surgery (Right, 3/28/2025).    Discharge Recommendations  Discharge Recommendations: Patient able to tolerate 3 hours of therapy per day, Patient would benefit from continued therapy after discharge  PT Equipment Recommendations  Equipment Needed: No (Patient reports owning RW)  Other: CTA, pt unsafe to attempt ambulation any distance without skilled assistance.    Assessment  Body Structures, Functions, Activity Limitations Requiring Skilled Therapeutic Intervention: Decreased functional mobility ;Decreased strength;Decreased safe awareness;Decreased cognition;Decreased balance  Assessment: Patient completed bed mobility with HOB elevated, ModAx2. Transfers completed with use of RW, MaxAx2. Patient completed ~10 side steps total throughout x2 stand-pivots, requiring MaxAx2. Patient demo deficits in 
Physical Therapy  Facility/Department: [facility] CAR 1- SICU   Physical Therapy Initial Evaluation    Patient Name: Brandon Smart        MRN: [id number]    : 1940    Date of Service: 3/29/2025    Chief Complaint   Patient presents with    Hip Injury     Right; [place]'s transfer     Past Medical History:  has a past medical history of Alzheimer dementia (HCC), CAD (coronary artery disease), Cataract, Diabetes mellitus (HCC), Goiter, nontoxic, multinodular, Hearing loss, bilateral, [place] (hard of hearing), Hyperlipidemia, Hypertension, Mediastinal mass, Osteoarthritis, Peptic ulcer, TIA (transient ischemic attack), Wears glasses, and Wears partial dentures.  Past Surgical History:  has a past surgical history that includes Cardiac surgery; Cardiac catheterization (,,); pacemaker placement (1999); Carpal tunnel release (Left); External ear surgery (Bilateral, 1959); sinus surgery; Tonsillectomy; Mastoid surgery (1959); hernia repair; Vasectomy; back surgery (2007); Colonoscopy; Mediastinoscopy (2014); Femur Surgery (Right, 2025); and Femur Surgery (Right, 2025).    Discharge Recommendations   Further therapy recommended at discharge.    PT Equipment Recommendations  Other: CTA, pt unsafe to attempt ambulation any distance without skilled assistance.    Assessment  Body Structures, Functions, Activity Limitations Requiring Skilled Therapeutic Intervention: Decreased functional mobility , Decreased strength, Decreased safe awareness, Decreased cognition, Decreased balance  Assessment: Pt maxAx2 bed mobility, modA standing face to face blocking knees and assisting hip extension control, pt amb maxA face to face 2' pivot steps to chair. Pt would benefit from continued acute PT to address deficits.  Therapy Prognosis: Fair  Decision Making: High Complexity  Requires PT Follow-Up: Yes  Activity Tolerance  Activity Tolerance: Patient tolerated treatment well, Patient 
[facility]   IN-PATIENT SERVICE   [facility]    Progress Note    3/31/2025    10:23 AM    Name:   Brandon Smart  MRN:     [id number]     Acct:      [id number]   Room:   0425/0425-01   Day:  4  Admit Date:  3/27/2025  3:32 PM    PCP:   Ruth Ann Richmond APRN - CNP  Code Status:  DNR-CCA    Subjective:     Interval History Status: improved.     Denies any chest pain or shortness of breath.  Pain is controlled.  On supplemental oxygen 2 L via nasal cannula    Patient was admitted under trauma service after he had a mechanical fall with right hip fracture underwent ORIF by orthopedic surgery yesterday.  Overall he remains hemodynamically stable.      Medications:     Allergies:    Allergies   Allergen Reactions    Latex     Amoxicillin Itching    Diclofenac        Current Meds:   Scheduled Meds:    lisinopril  20 mg Oral Daily    tamsulosin  0.4 mg Oral Daily    amLODIPine  10 mg Oral Daily    sodium chloride flush  5-40 mL IntraVENous 2 times per day    polyethylene glycol  17 g Oral Daily    heparin (porcine)  5,000 Units SubCUTAneous 3 times per day    acetaminophen  1,000 mg Oral 3 times per day    ascorbic acid  500 mg Oral Daily    atorvastatin  10 mg Oral Daily    carvedilol  25 mg Oral BID WC    Vitamin D  2,000 Units Oral Daily    cloNIDine  0.1 mg Oral TID    donepezil  10 mg Oral Nightly    ferrous sulfate  325 mg Oral Daily with breakfast    finasteride  5 mg Oral Daily    magnesium oxide  400 mg Oral BID    memantine  10 mg Oral BID    vitamin B-12  1,000 mcg Oral Daily    insulin lispro  0-16 Units SubCUTAneous 4x Daily AC & HS     Continuous Infusions:    sodium chloride      sodium chloride       PRN Meds: hydrALAZINE, labetalol, lidocaine, sodium chloride, sodium chloride flush, sodium chloride, ondansetron **OR** ondansetron, oxyCODONE    Data:     Past Medical History:   has a past medical history of Alzheimer dementia (HCC), CAD (coronary artery disease), 
[facility]   IN-PATIENT SERVICE   [facility]    Progress Note    3/30/2025    11:02 AM    Name:   Brandon Smart  MRN:     [id number]     Acct:      [id number]   Room:   0425/0425-01   Day:  3  Admit Date:  3/27/2025  3:32 PM    PCP:   Ruth Ann Richmond APRN - CNP  Code Status:  DNR-CCA    Subjective:     Interval History Status: improved.     Denies any chest pain or shortness of breath.  Pain is controlled.  On supplemental oxygen 2 L via nasal cannula    Patient was admitted under trauma service after he had a mechanical fall with right hip fracture underwent ORIF by orthopedic surgery yesterday.  Overall he remains hemodynamically stable.      Medications:     Allergies:    Allergies   Allergen Reactions    Latex     Amoxicillin Itching    Diclofenac        Current Meds:   Scheduled Meds:    tamsulosin  0.4 mg Oral Daily    [START ON 3/31/2025] amLODIPine  10 mg Oral Daily    amLODIPine  5 mg Oral Once    sodium chloride flush  5-40 mL IntraVENous 2 times per day    polyethylene glycol  17 g Oral Daily    heparin (porcine)  5,000 Units SubCUTAneous 3 times per day    acetaminophen  1,000 mg Oral 3 times per day    ascorbic acid  500 mg Oral Daily    atorvastatin  10 mg Oral Daily    carvedilol  25 mg Oral BID WC    Vitamin D  2,000 Units Oral Daily    cloNIDine  0.1 mg Oral TID    donepezil  10 mg Oral Nightly    ferrous sulfate  325 mg Oral Daily with breakfast    finasteride  5 mg Oral Daily    magnesium oxide  400 mg Oral BID    memantine  10 mg Oral BID    vitamin B-12  1,000 mcg Oral Daily    insulin lispro  0-16 Units SubCUTAneous 4x Daily AC & HS     Continuous Infusions:    lactated ringers 50 mL/hr at 03/30/25 0645    sodium chloride      sodium chloride       PRN Meds: sodium chloride, sodium chloride flush, sodium chloride, ondansetron **OR** ondansetron, oxyCODONE    Data:     Past Medical History:   has a past medical history of Alzheimer dementia (HCC), CAD 
Sent message to Dr Laurent regarding void trial orders needed for pt dc  
Writer notified trauma team of patient's blood pressure of 160/58. Per trauma resident \"If he is not >160 he should be okay until he can get his morning meds. If he gets any higher, we may add a PRN.\"  
Xray at bedside  
and kept under evaluation.  No complaints or concerns.  No issue overnight.  Pain controlled.  Denies fever, HA, CP, SOB, N/V, dysuria.  To work with PT post-op.      Current vitals:  Temp: Temp: 98.3 °F (36.8 °C)Temp  Av °F (36.7 °C)  Min: 97.7 °F (36.5 °C)  Max: 98.3 °F (36.8 °C) BP Systolic (24hrs), Av , Min:115 , Max:174   Diastolic (24hrs), Av, Min:40, Max:94   Pulse Pulse  Av.7  Min: 65  Max: 97 Resp Resp  Av.1  Min: 13  Max: 30 Pulse ox SpO2  Av.2 %  Min: 86 %  Max: 100 %    Consults:  Orthopedic Surgery  WBAT RLE  Dressings about RLE, please maintain and reinforce as needed  PT/OT   F/u Dr. Madsen 4/15   Geriatrics - managing  Cardiology        Transfer  Checklist:  [x]   Vital signs changed to q4 hours x 48 hours, continuous telemetry x 48 hours  [x]   Provider assessment BID x 48 hours  [x]   Daily labs x 48 hours  []   Tertiary note completed  [x]   Frailty index completed  [x]   Geriatrics consult placed/called if applicable  [x]   Medications/orders reviewed  []   Updated photographs of wounds   [x]   Wound dressing orders placed if applicable       Electronically signed by Harris Forde MD on 3/29/2025 at 8:48 PM     
hyperattenuation within the cerebellar hemispheres is likely artifactual or related to nonspecific parenchymal mineralization. 2.  No acute cervical spine fracture. 3.  No acute thoracolumbar spine fracture.  Chronic L5 compression fracture deformity. 4.  Defects within the temporal bones bilaterally possibly related to prior mastoidectomies though clinical correlation recommended.  Scattered mastoid air cell effusions as well as fluid within the middle ear cavities bilaterally may reflect otomastoiditis in the appropriate clinical setting. 5.  Moderate-sized right pleural effusion.  Bibasilar infiltrates most pronounced on the right concerning for multifocal pneumonia.  Imaging follow-up recommended to document resolution.     CT THORACIC SPINE WO CONTRAST  Result Date: 3/27/2025  1.  No acute intracranial abnormality.  Streaky hyperattenuation within the cerebellar hemispheres is likely artifactual or related to nonspecific parenchymal mineralization. 2.  No acute cervical spine fracture. 3.  No acute thoracolumbar spine fracture.  Chronic L5 compression fracture deformity. 4.  Defects within the temporal bones bilaterally possibly related to prior mastoidectomies though clinical correlation recommended.  Scattered mastoid air cell effusions as well as fluid within the middle ear cavities bilaterally may reflect otomastoiditis in the appropriate clinical setting. 5.  Moderate-sized right pleural effusion.  Bibasilar infiltrates most pronounced on the right concerning for multifocal pneumonia.  Imaging follow-up recommended to document resolution.     XR HIP BILATERAL W AP PELVIS (2 VIEWS)  Result Date: 3/27/2025  1. Mildly displaced right intertrochanteric fracture. 2. Moderate osteoarthritic changes of the right hip.     XR CHEST PORTABLE  Result Date: 3/27/2025  Cardiomegaly Right basilar opacity could represent subsegmental atelectasis or infection       Physical Examination:        General appearance:  alert, 
    Right proximal femur fracture status post mechanical fall: Patient underwent ORIF on 3/28/2025.  He is medically stable at this point.  Type 2 diabetes: Is well-controlled continue to monitor and continue sliding scale as needed.  Primary hypertension: This is also well-controlled without medications continue Coreg and Norvasc.  PT OT supportive care and discharge planning    Romana Puga MD  3/29/2025  11:54 AM   
steps modA B rails    Minutes  PT Individual Minutes  Time In: 0938  Time Out: 1019  Minutes: 41  Time Code Minutes  Timed Code Treatment Minutes: 38 Minutes    Co- treatment with OT warranted secondary to decreased patient safety and independence with functional mobility requiring skilled physical assistance of two professionals to simultaneously address individualized discipline goals. PT is addressing bed mobility, transfer training, strength training , while OT is addressing their individualized functional mobility/self-care task.      Electronically signed by Dipti Blanco PTA on 4/1/25 at 11:41 AM EDT  
03/28/2025 06:41 AM    LEUKOCYTESUR NEGATIVE 03/28/2025 06:41 AM    UROBILINOGEN Normal 03/28/2025 06:41 AM    BILIRUBINUR NEGATIVE 03/28/2025 06:41 AM    GLUCOSEU NEGATIVE 03/28/2025 06:41 AM    KETUA SMALL 03/28/2025 06:41 AM    AMORPHOUS NOT REPORTED 09/24/2018 03:13 PM       LAB SCHEDULE:  Active Lab Orders   Lab    Basic Metabolic Panel w/ Reflex to MG     Frequency: Daily     Number of Occurrences: 3 Weeks    CBC with Auto Differential     Frequency: Daily     Number of Occurrences: 3 Weeks    Drug screen multi urine     Frequency: 1 Time     Number of Occurrences: 1 Occurrences        RADIOLOGY:    XR Right Femur  Reading Date: 3/27/25  IMPRESSION:  1. Intertrochanteric fracture    XR Hip Bilateral  Reading Date: 3/27/25  IMPRESSION:  1. Mildly displaced right intertrochanteric fracture.  2. Moderate osteoarthritic changes of the right hip.    XR Chest  Reading Date: 3/27/25  IMPRESSION:  1. Cardiomegaly  2. Right basilar opacity could represent subsegmental atelectasis or infection    CT Head, Cervical Spine, Thoracic Spine, Lumbar Spine, and Pelvis WO Contrast  Reading Date: 3/27/25  IMPRESSION:  1.  No acute intracranial abnormality.  Streaky hyperattenuation within the  cerebellar hemispheres is likely artifactual or related to nonspecific  parenchymal mineralization.  2.  No acute cervical spine fracture.  3.  No acute thoracolumbar spine fracture.  Chronic L5 compression fracture  deformity.  4.  Defects within the temporal bones bilaterally possibly related to prior  mastoidectomies though clinical correlation recommended.  Scattered mastoid  air cell effusions as well as fluid within the middle ear cavities  bilaterally may reflect otomastoiditis in the appropriate clinical setting.  5.  Moderate-sized right pleural effusion.  Bibasilar infiltrates most  pronounced on the right concerning for multifocal pneumonia.  Imaging  follow-up recommended to document resolution.      Lowell Azevedo, OMS-3  [place] 
as reasonably achievable.; CT of the thoracic spine was performed without the administration of intravenous contrast. Multiplanar reformatted images are provided for review. Automated exposure control, iterative reconstruction, and/or weight based adjustment of the mA/kV was utilized to reduce the radiation dose to as low as reasonably achievable. COMPARISON: None. HISTORY: ORDERING SYSTEM PROVIDED HISTORY: fall on blood thinner TECHNOLOGIST PROVIDED HISTORY: fall on blood thinner Decision Support Exception - unselect if not a suspected or confirmed emergency medical condition->Emergency Medical Condition (MA) Reason for Exam: fall; ORDERING SYSTEM PROVIDED HISTORY: fall TECHNOLOGIST PROVIDED HISTORY: fall Decision Support Exception - unselect if not a suspected or confirmed emergency medical condition->Emergency Medical Condition (MA) Reason for Exam: fall; ORDERING SYSTEM PROVIDED HISTORY: fall TECHNOLOGIST PROVIDED HISTORY: fall Reason for Exam: fall FINDINGS: HEAD BRAIN/VENTRICLES: There is no acute intracranial hemorrhage, mass effect or midline shift.  No abnormal extra-axial fluid collection.  The gray-white differentiation is maintained without evidence of an acute infarct.  There is no evidence of hydrocephalus.   Moderate chronic microangiopathy.  Trace nonspecific mineralization within the basal ganglia bilaterally.  Streaky hyperattenuation within the cerebellar hemispheres bilaterally thought to represent artifact or nonspecific mineralization. ORBITS: The visualized portion of the orbits demonstrate no acute abnormality. SINUSES: Moderate to severe diffuse paranasal sinus disease.  Bilateral temporal bone defect suggesting prior mastoidectomies.  Scattered mastoid air cell effusions bilaterally.  Fluid within the middle ear cavities bilaterally. SOFT TISSUES/SKULL:  No acute abnormality of the visualized skull or soft tissues. CERVICAL SPINE BONES/ALIGNMENT: There is no acute fracture or traumatic

## 2025-04-03 NOTE — PLAN OF CARE
Problem: Safety - Adult  Goal: Free from fall injury  4/3/2025 0707 by Lesa Burnham RN  Outcome: Progressing  4/3/2025 0005 by Victor Hugo Rhodes RN  Outcome: Progressing  4/2/2025 1813 by Karina Devine RN  Outcome: Progressing     Problem: Chronic Conditions and Co-morbidities  Goal: Patient's chronic conditions and co-morbidity symptoms are monitored and maintained or improved  4/3/2025 0707 by Lesa Burnham RN  Outcome: Progressing  4/3/2025 0005 by Victor Hugo Rhodes RN  Outcome: Progressing  4/2/2025 1813 by Karina Devine RN  Outcome: Progressing     Problem: Discharge Planning  Goal: Discharge to home or other facility with appropriate resources  4/3/2025 0707 by Lesa Burnham RN  Outcome: Progressing  4/3/2025 0005 by Victor Hugo Rhodes RN  Outcome: Progressing  4/2/2025 1813 by Karina Devine RN  Outcome: Progressing     Problem: Skin/Tissue Integrity  Goal: Skin integrity remains intact  Description: 1.  Monitor for areas of redness and/or skin breakdown  2.  Assess vascular access sites hourly  3.  Every 4-6 hours minimum:  Change oxygen saturation probe site  4.  Every 4-6 hours:  If on nasal continuous positive airway pressure, respiratory therapy assess nares and determine need for appliance change or resting period  4/3/2025 0707 by Lesa Burnham RN  Outcome: Progressing  4/3/2025 0005 by Victor Hugo Rhodes RN  Outcome: Progressing  4/2/2025 1813 by Karina Devine RN  Outcome: Progressing     Problem: ABCDS Injury Assessment  Goal: Absence of physical injury  4/3/2025 0707 by Lesa Burnham RN  Outcome: Progressing  4/3/2025 0005 by Victor Hugo Rhodes RN  Outcome: Progressing  4/2/2025 1813 by Karina Devine RN  Outcome: Progressing     Problem: Confusion  Goal: Confusion, delirium, dementia, or psychosis is improved or at baseline  Description: INTERVENTIONS:  1. Assess for possible contributors to thought disturbance, including medications, impaired vision or hearing, underlying metabolic

## 2025-04-15 ENCOUNTER — OFFICE VISIT (OUTPATIENT)
Dept: ORTHOPEDIC SURGERY | Age: 85
End: 2025-04-15

## 2025-04-15 VITALS — WEIGHT: 135 LBS | HEIGHT: 65 IN | BODY MASS INDEX: 22.49 KG/M2

## 2025-04-15 DIAGNOSIS — S72.141A CLOSED COMMINUTED INTERTROCHANTERIC FRACTURE OF PROXIMAL FEMUR, RIGHT, INITIAL ENCOUNTER (HCC): Primary | ICD-10-CM

## 2025-04-15 NOTE — PROGRESS NOTES
I saw an examined the patient and agree with the note listed below. I personally reviewed and interpreted all relevant images obtained as related to the diagnoses in order to formulate the treatment plan.    Electronically signed by Mayo Madsen DO on 5/4/2025 at 11:33 AM        [facility]  [address]  [place] [address]  Dept: [phone]  Dept Fax: [phone]        [facility] Follow Up      Subjective:   Date of Surgery: 3/28/2025 cephalomedullary nail placement in right femur    Brandon Smart is a 85 y.o. year old male who presents to the clinic today for routine follow up 2 weeks and 4 days status post cephalomedullary nail placement in right femur secondary to right intertrochanteric femur fracture.  Patient is sleep and not easily arousable during this visit.  The patient's daughters state his tired state has been present since ORIF of the right femur fracture.  HPI gathered from his 2 daughters present during this visit.  The daughter states he has not had any complaints of pain today or at facility.  The patient has not had any nausea, vomiting, fever, chills at facility.    Review of Systems   Constitutional:  Positive for activity change. Negative for chills.   HENT:  Positive for hearing loss. Negative for drooling, ear discharge, facial swelling, nosebleeds, rhinorrhea and sneezing.    Eyes:  Negative for photophobia, discharge and redness.   Respiratory:  Negative for cough and shortness of breath.    Cardiovascular:  Negative for leg swelling.   Gastrointestinal:  Negative for vomiting.       Objective :   There were no vitals filed for this visit.Body mass index is 22.47 kg/m².  General: No acute distress, resting comfortably in the clinic  Neuro: alert. oriented  Eyes: Extra-ocular muscles intact  Pulm: Respirations unlabored and regular.  Skin: warm, well perfused  Psych:   Patient has good fund of

## 2025-04-23 ENCOUNTER — APPOINTMENT (OUTPATIENT)
Dept: CT IMAGING | Age: 85
End: 2025-04-23
Payer: MEDICARE

## 2025-04-23 ENCOUNTER — APPOINTMENT (OUTPATIENT)
Dept: GENERAL RADIOLOGY | Age: 85
End: 2025-04-23
Payer: MEDICARE

## 2025-04-23 ENCOUNTER — HOSPITAL ENCOUNTER (EMERGENCY)
Age: 85
Discharge: HOME | End: 2025-04-23
Attending: EMERGENCY MEDICINE
Payer: MEDICARE

## 2025-04-23 VITALS
TEMPERATURE: 97.6 F | SYSTOLIC BLOOD PRESSURE: 141 MMHG | RESPIRATION RATE: 14 BRPM | OXYGEN SATURATION: 97 % | DIASTOLIC BLOOD PRESSURE: 69 MMHG | HEART RATE: 77 BPM | BODY MASS INDEX: 24.96 KG/M2 | WEIGHT: 150 LBS

## 2025-04-23 DIAGNOSIS — R53.1 GENERAL WEAKNESS: Primary | ICD-10-CM

## 2025-04-23 DIAGNOSIS — D64.9 CHRONIC ANEMIA: ICD-10-CM

## 2025-04-23 LAB
ANION GAP SERPL CALCULATED.3IONS-SCNC: 9 MMOL/L (ref 9–17)
BASOPHILS # BLD: 0.1 K/UL (ref 0–0.2)
BASOPHILS NFR BLD: 1 % (ref 0–2)
BILIRUB UR QL STRIP: NEGATIVE
BUN SERPL-MCNC: 22 MG/DL (ref 8–23)
CALCIUM SERPL-MCNC: 9 MG/DL (ref 8.6–10.4)
CASTS #/AREA URNS LPF: ABNORMAL /LPF
CASTS #/AREA URNS LPF: ABNORMAL /LPF
CHARACTER UR: ABNORMAL
CHLORIDE SERPL-SCNC: 110 MMOL/L (ref 98–107)
CLARITY UR: CLEAR
CO2 SERPL-SCNC: 28 MMOL/L (ref 20–31)
COLOR UR: YELLOW
CREAT SERPL-MCNC: 1.2 MG/DL (ref 0.7–1.2)
EOSINOPHIL # BLD: 0.5 K/UL (ref 0–0.4)
EOSINOPHILS RELATIVE PERCENT: 6 % (ref 1–4)
EPI CELLS #/AREA URNS HPF: ABNORMAL /HPF (ref 0–5)
ERYTHROCYTE [DISTWIDTH] IN BLOOD BY AUTOMATED COUNT: 15 % (ref 12.5–15.4)
GFR, ESTIMATED: 59 ML/MIN/1.73M2
GLUCOSE SERPL-MCNC: 159 MG/DL (ref 70–99)
GLUCOSE UR STRIP-MCNC: NEGATIVE MG/DL
HCT VFR BLD AUTO: 26.7 % (ref 41–53)
HGB BLD-MCNC: 8.5 G/DL (ref 13.5–17.5)
HGB UR QL STRIP.AUTO: NEGATIVE
KETONES UR STRIP-MCNC: ABNORMAL MG/DL
LEUKOCYTE ESTERASE UR QL STRIP: NEGATIVE
LYMPHOCYTES NFR BLD: 1 K/UL (ref 1–4.8)
LYMPHOCYTES RELATIVE PERCENT: 11 % (ref 24–44)
MCH RBC QN AUTO: 27.9 PG (ref 26–34)
MCHC RBC AUTO-ENTMCNC: 31.9 G/DL (ref 31–37)
MCV RBC AUTO: 87.5 FL (ref 80–100)
MONOCYTES NFR BLD: 0.7 K/UL (ref 0.1–1.2)
MONOCYTES NFR BLD: 7 % (ref 2–11)
MUCOUS THREADS URNS QL MICRO: ABNORMAL
NEUTROPHILS NFR BLD: 75 % (ref 36–66)
NEUTS SEG NFR BLD: 6.9 K/UL (ref 1.8–7.7)
NITRITE UR QL STRIP: NEGATIVE
PH UR STRIP: 6.5 [PH] (ref 5–8)
PLATELET # BLD AUTO: 187 K/UL (ref 140–450)
PMV BLD AUTO: 9.6 FL (ref 6–12)
POTASSIUM SERPL-SCNC: 4.3 MMOL/L (ref 3.7–5.3)
PROT UR STRIP-MCNC: ABNORMAL MG/DL
RBC # BLD AUTO: 3.05 M/UL (ref 4.5–5.9)
RBC #/AREA URNS HPF: ABNORMAL /HPF (ref 0–2)
SODIUM SERPL-SCNC: 147 MMOL/L (ref 135–144)
SP GR UR STRIP: 1.02 (ref 1–1.03)
TROPONIN I SERPL HS-MCNC: 47 NG/L (ref 0–22)
TROPONIN I SERPL HS-MCNC: 47 NG/L (ref 0–22)
UROBILINOGEN UR STRIP-ACNC: NORMAL EU/DL (ref 0–1)
WBC #/AREA URNS HPF: ABNORMAL /HPF (ref 0–5)
WBC OTHER # BLD: 9.1 K/UL (ref 3.5–11)

## 2025-04-23 PROCEDURE — 96361 HYDRATE IV INFUSION ADD-ON: CPT

## 2025-04-23 PROCEDURE — 2580000003 HC RX 258: Performed by: NURSE PRACTITIONER

## 2025-04-23 PROCEDURE — 84484 ASSAY OF TROPONIN QUANT: CPT

## 2025-04-23 PROCEDURE — 96360 HYDRATION IV INFUSION INIT: CPT

## 2025-04-23 PROCEDURE — 93005 ELECTROCARDIOGRAM TRACING: CPT | Performed by: NURSE PRACTITIONER

## 2025-04-23 PROCEDURE — 85025 COMPLETE CBC W/AUTO DIFF WBC: CPT

## 2025-04-23 PROCEDURE — 70450 CT HEAD/BRAIN W/O DYE: CPT

## 2025-04-23 PROCEDURE — 73502 X-RAY EXAM HIP UNI 2-3 VIEWS: CPT

## 2025-04-23 PROCEDURE — 81001 URINALYSIS AUTO W/SCOPE: CPT

## 2025-04-23 PROCEDURE — 80048 BASIC METABOLIC PNL TOTAL CA: CPT

## 2025-04-23 PROCEDURE — 99285 EMERGENCY DEPT VISIT HI MDM: CPT

## 2025-04-23 PROCEDURE — 71045 X-RAY EXAM CHEST 1 VIEW: CPT

## 2025-04-23 RX ORDER — 0.9 % SODIUM CHLORIDE 0.9 %
500 INTRAVENOUS SOLUTION INTRAVENOUS ONCE
Status: COMPLETED | OUTPATIENT
Start: 2025-04-23 | End: 2025-04-23

## 2025-04-23 RX ADMIN — SODIUM CHLORIDE 500 ML: 0.9 INJECTION, SOLUTION INTRAVENOUS at 17:48

## 2025-04-23 ASSESSMENT — ENCOUNTER SYMPTOMS
SORE THROAT: 0
VOMITING: 0
ABDOMINAL PAIN: 0
COUGH: 0
BACK PAIN: 0
NAUSEA: 0
DIARRHEA: 0
EYE PAIN: 0
SINUS PAIN: 0
SHORTNESS OF BREATH: 0

## 2025-04-23 ASSESSMENT — PAIN DESCRIPTION - LOCATION: LOCATION: HIP

## 2025-04-23 ASSESSMENT — PAIN SCALES - GENERAL: PAINLEVEL_OUTOF10: 5

## 2025-04-23 ASSESSMENT — PAIN - FUNCTIONAL ASSESSMENT: PAIN_FUNCTIONAL_ASSESSMENT: 0-10

## 2025-04-23 ASSESSMENT — PAIN DESCRIPTION - ORIENTATION: ORIENTATION: RIGHT

## 2025-04-23 NOTE — ED PROVIDER NOTES
[facility] Emergency Department      Pt Name: Brandon Smart  MRN: [id number]  Birthdate 1940  Date of evaluation: 4/23/2025    EMERGENCY DEPARTMENT ENCOUNTER      PERTINENT ATTENDING PHYSICIAN COMMENTS:      Faculty Attestation    I performed a history and physical examination of the patient and discussed management with the mid level provideer. I reviewed the mid level provider's note and agree with the documented findings and plan of care.Any areas of disagreement are noted on the chart. I was personally present for the key portions of any procedures. I have documented in the chart those procedures where I was not present during the key portions. I have reviewed the emergency nurses triage note. I agree with the chief complaint, past medical history, past surgical history, allergies, medications, social and family history as documented unless otherwise noted below. Documentation of the HPI, Physical Exam and Medical Decision Making performed by medical students or scribes is based on my personal performance of the HPI, PE and MDM. For Residents/Physician Assistant/ Nurse Practitioner cases/documentation I have personally evaluated this patient and have completed at least one if not all key elements of the E/M (history, physical exam, and MDM). Additional findings are as noted.    CHIEF COMPLAINT       Chief Complaint   Patient presents with    abnormal labs     Pt arrives via ems from [facility] abnormal labs (low iron). Pt is dnr cca. Per nurse at [facility] pt has been at their facility since 4/3/25 for hip replacement         HISTORY OF PRESENT ILLNESS    Brandon Smart is a 85 y.o. male who presents to the Emergency Department by EMS from the nursing home for increasing fatigue and anemia.  Patient is status post hip surgery discharge to the nursing home 3 weeks ago and family feels that he has had a significant decline over the past several weeks.  He had outpatient labs that showed a low

## 2025-04-23 NOTE — DISCHARGE INSTRUCTIONS
Follow-up with your primary care provider, orthopedic provider, return to the ER with any new or worsening symptoms

## 2025-04-23 NOTE — ED PROVIDER NOTES
[facility] Emergency Department  [address]  [place] [address]  Phone: [phone]  Fax: [phone]        Pt Name: Brandon Smart  MRN: [id number]  Birthdate 1940  Date of evaluation: 4/23/25    CHIEFCOMPLAINT       Chief Complaint   Patient presents with    abnormal labs     Pt arrives via ems from [facility] abnormal labs (low iron). Pt is dnr cca. Per nurse at [facility] pt has been at their facility since 4/3/25 for hip replacement        HISTORY OF PRESENT ILLNESS (Location/Symptom, Timing/Onset, Context/Setting, Quality, Duration, Modifying Factors, Severity)      Brandon mSart is a 85 y.o. male with no pertinent PMH who presents to the ED via private auto with with concern for fatigue/weakness.  Patient comes in from a nursing facility today.  At the end of March patient had a fall subsequently had a right hip fracture and underwent surgical intervention at that time.  Subsequently patient went to a nursing facility.  Patient has been at the nursing facility and reportedly had abnormally low hemoglobin today and was sent to the ER for evaluation.  Family is also concerned as he has been fatigued and lethargic for the past several weeks.  Patient will wake up for approximately 1 to 2 minutes at a time.  Patient is not complaining of any pain.  Patient denies any difficulty breathing or shortness of breath.  Denies any headache or dizziness.  Patient is a DNR CCA.  Follows with orthopedic surgeon Dr. Madsen    PAST MEDICAL / SURGICAL / SOCIAL / FAMILY HISTORY     PMH:  has a past medical history of Alzheimer dementia (HCC), CAD (coronary artery disease), Cataract, Diabetes mellitus (HCC), Goiter, nontoxic, multinodular, Hearing loss, bilateral, [place] (hard of hearing), Hyperlipidemia, Hypertension, Mediastinal mass, Osteoarthritis, Peptic ulcer, TIA (transient ischemic attack), Wears glasses, and Wears partial dentures.  Surgical History:  has a past surgical history

## 2025-04-24 LAB
EKG ATRIAL RATE: 75 BPM
EKG P AXIS: 71 DEGREES
EKG P-R INTERVAL: 210 MS
EKG Q-T INTERVAL: 442 MS
EKG QRS DURATION: 142 MS
EKG QTC CALCULATION (BAZETT): 493 MS
EKG R AXIS: 116 DEGREES
EKG T AXIS: 30 DEGREES
EKG VENTRICULAR RATE: 75 BPM

## 2025-04-24 NOTE — ED NOTES
Report to transport team - pt resting quietly; pt transported back to [facility] via stretcher BLS

## 2025-04-27 PROBLEM — Z01.818 PRE-OP EVALUATION: Status: RESOLVED | Noted: 2025-03-28 | Resolved: 2025-04-27

## (undated) DEVICE — Device

## (undated) DEVICE — BLADE,STAINLESS-STEEL,15,STRL,DISPOSABLE: Brand: MEDLINE

## (undated) DEVICE — BIT DRL L145MM DIA4.2MM NONSTERILE 3 FLUT NDL PNT QUIK CPL

## (undated) DEVICE — BIT DRL L500MM DIA6X9MM CANN STP L QUIK CPL FOR DH DC TFN

## (undated) DEVICE — Z DISCONTINUED USE 2220147 SUTURE VCRL SZ 0 L27IN ABSRB UD L26MM CT-2 1/2 CIR J270H

## (undated) DEVICE — BIT DRL L266MM DIA16MM CANN L QUIK CPL FOR PROX FEM NAILING

## (undated) DEVICE — GOWN,AURORA,NONREINFORCED,LARGE: Brand: MEDLINE

## (undated) DEVICE — ROPE TRACTION STERILE 72IN

## (undated) DEVICE — GOWN,SIRUS,NONRNF,SETINSLV,2XL,18/CS: Brand: MEDLINE

## (undated) DEVICE — STRAP ARMBRD W1.5XL32IN FOAM STR YET SFT W/ HK AND LOOP

## (undated) DEVICE — GLOVE ORTHO 8   MSG9480

## (undated) DEVICE — DRESSING FOAM W4XL4IN AG SIL FACE BORD IONIC ANTIMIC ADH

## (undated) DEVICE — GLOVE ORANGE PI 8   MSG9080

## (undated) DEVICE — STOCKINETTE,IMPERVIOUS,12X48,STERILE: Brand: MEDLINE

## (undated) DEVICE — C-ARMOR C-ARM EQUIPMENT COVERS CLEAR STERILE UNIVERSAL FIT 12 PER CASE: Brand: C-ARMOR

## (undated) DEVICE — ROD RMR L950MM DIA2.5MM W/ EXTN BALL TIP

## (undated) DEVICE — SUTURE MONOCRYL SZ 2-0 L27IN ABSRB UD SH L26MM TAPERPOINT NDL Y417H

## (undated) DEVICE — BLADE,CARBON-STEEL,10,STRL,DISPOSABLE: Brand: MEDLINE

## (undated) DEVICE — SURGICAL SUCTION CONNECTING TUBE WITH MALE CONNECTOR AND SUCTION CLAMP, 2 FT. LONG (.6 M), 5 MM I.D.: Brand: CONMED

## (undated) DEVICE — BIT DRL L300MM DIA10MM CANN TAPR L QUIK CPL FOR DH DC TFN

## (undated) DEVICE — GUIDEWIRE ORTH L400MM DIA3.2MM FOR TFN

## (undated) DEVICE — DRESSING FOAM W4XL10IN AG SIL ADH ANTIMIC POSTOP OPTIFOAM